# Patient Record
Sex: FEMALE | Race: WHITE | Employment: OTHER | URBAN - METROPOLITAN AREA
[De-identification: names, ages, dates, MRNs, and addresses within clinical notes are randomized per-mention and may not be internally consistent; named-entity substitution may affect disease eponyms.]

---

## 2017-01-01 ENCOUNTER — APPOINTMENT (OUTPATIENT)
Dept: GENERAL RADIOLOGY | Age: 82
End: 2017-01-01
Attending: EMERGENCY MEDICINE
Payer: MEDICARE

## 2017-01-01 ENCOUNTER — HOSPITAL ENCOUNTER (EMERGENCY)
Age: 82
Discharge: HOME | End: 2017-12-17
Attending: EMERGENCY MEDICINE
Payer: MEDICARE

## 2017-01-01 VITALS
HEART RATE: 72 BPM | TEMPERATURE: 97.8 F | OXYGEN SATURATION: 95 % | RESPIRATION RATE: 18 BRPM | HEIGHT: 65 IN | DIASTOLIC BLOOD PRESSURE: 164 MMHG | SYSTOLIC BLOOD PRESSURE: 219 MMHG | BODY MASS INDEX: 21.66 KG/M2 | WEIGHT: 130 LBS

## 2017-01-01 DIAGNOSIS — R06.02 SOB (SHORTNESS OF BREATH): Primary | ICD-10-CM

## 2017-01-01 LAB
ALBUMIN SERPL-MCNC: 3.8 G/DL (ref 3.5–5)
ALBUMIN/GLOB SERPL: 1 {RATIO} (ref 1.1–2.2)
ALP SERPL-CCNC: 102 U/L (ref 45–117)
ALT SERPL-CCNC: 20 U/L (ref 12–78)
ANION GAP SERPL CALC-SCNC: 6 MMOL/L (ref 5–15)
APTT PPP: 26.1 SEC (ref 22.1–32.5)
AST SERPL-CCNC: 14 U/L (ref 15–37)
ATRIAL RATE: 69 BPM
BASOPHILS # BLD: 0 K/UL (ref 0–0.1)
BASOPHILS NFR BLD: 0 % (ref 0–1)
BILIRUB SERPL-MCNC: 0.2 MG/DL (ref 0.2–1)
BNP SERPL-MCNC: 354 PG/ML (ref 0–450)
BUN SERPL-MCNC: 18 MG/DL (ref 6–20)
BUN/CREAT SERPL: 19 (ref 12–20)
CALCIUM SERPL-MCNC: 9.7 MG/DL (ref 8.5–10.1)
CALCULATED P AXIS, ECG09: 65 DEGREES
CALCULATED R AXIS, ECG10: 66 DEGREES
CALCULATED T AXIS, ECG11: 72 DEGREES
CHLORIDE SERPL-SCNC: 101 MMOL/L (ref 97–108)
CO2 SERPL-SCNC: 26 MMOL/L (ref 21–32)
CREAT SERPL-MCNC: 0.94 MG/DL (ref 0.55–1.02)
DIAGNOSIS, 93000: NORMAL
EOSINOPHIL # BLD: 0.2 K/UL (ref 0–0.4)
EOSINOPHIL NFR BLD: 2 % (ref 0–7)
ERYTHROCYTE [DISTWIDTH] IN BLOOD BY AUTOMATED COUNT: 13.1 % (ref 11.5–14.5)
GLOBULIN SER CALC-MCNC: 3.7 G/DL (ref 2–4)
GLUCOSE SERPL-MCNC: 139 MG/DL (ref 65–100)
HCT VFR BLD AUTO: 41.7 % (ref 35–47)
HGB BLD-MCNC: 14.1 G/DL (ref 11.5–16)
INR PPP: 1 (ref 0.9–1.1)
LYMPHOCYTES # BLD: 5.4 K/UL (ref 0.8–3.5)
LYMPHOCYTES NFR BLD: 47 % (ref 12–49)
MAGNESIUM SERPL-MCNC: 1.9 MG/DL (ref 1.6–2.4)
MCH RBC QN AUTO: 30.1 PG (ref 26–34)
MCHC RBC AUTO-ENTMCNC: 33.8 G/DL (ref 30–36.5)
MCV RBC AUTO: 89.1 FL (ref 80–99)
MONOCYTES # BLD: 0.8 K/UL (ref 0–1)
MONOCYTES NFR BLD: 7 % (ref 5–13)
NEUTS SEG # BLD: 5.2 K/UL (ref 1.8–8)
NEUTS SEG NFR BLD: 44 % (ref 32–75)
P-R INTERVAL, ECG05: 148 MS
PLATELET # BLD AUTO: 323 K/UL (ref 150–400)
POTASSIUM SERPL-SCNC: 4.5 MMOL/L (ref 3.5–5.1)
PROT SERPL-MCNC: 7.5 G/DL (ref 6.4–8.2)
PROTHROMBIN TIME: 10 SEC (ref 9–11.1)
Q-T INTERVAL, ECG07: 428 MS
QRS DURATION, ECG06: 82 MS
QTC CALCULATION (BEZET), ECG08: 458 MS
RBC # BLD AUTO: 4.68 M/UL (ref 3.8–5.2)
SODIUM SERPL-SCNC: 133 MMOL/L (ref 136–145)
THERAPEUTIC RANGE,PTTT: NORMAL SECS (ref 58–77)
TROPONIN I SERPL-MCNC: <0.04 NG/ML
VENTRICULAR RATE, ECG03: 69 BPM
WBC # BLD AUTO: 11.6 K/UL (ref 3.6–11)

## 2017-01-01 PROCEDURE — 74011250636 HC RX REV CODE- 250/636: Performed by: EMERGENCY MEDICINE

## 2017-01-01 PROCEDURE — 71010 XR CHEST PORT: CPT

## 2017-01-01 PROCEDURE — 85025 COMPLETE CBC W/AUTO DIFF WBC: CPT | Performed by: EMERGENCY MEDICINE

## 2017-01-01 PROCEDURE — 83880 ASSAY OF NATRIURETIC PEPTIDE: CPT | Performed by: EMERGENCY MEDICINE

## 2017-01-01 PROCEDURE — 85730 THROMBOPLASTIN TIME PARTIAL: CPT | Performed by: EMERGENCY MEDICINE

## 2017-01-01 PROCEDURE — 85610 PROTHROMBIN TIME: CPT | Performed by: EMERGENCY MEDICINE

## 2017-01-01 PROCEDURE — 80053 COMPREHEN METABOLIC PANEL: CPT | Performed by: EMERGENCY MEDICINE

## 2017-01-01 PROCEDURE — 93005 ELECTROCARDIOGRAM TRACING: CPT

## 2017-01-01 PROCEDURE — 99283 EMERGENCY DEPT VISIT LOW MDM: CPT

## 2017-01-01 PROCEDURE — 83735 ASSAY OF MAGNESIUM: CPT | Performed by: EMERGENCY MEDICINE

## 2017-01-01 PROCEDURE — 36415 COLL VENOUS BLD VENIPUNCTURE: CPT | Performed by: EMERGENCY MEDICINE

## 2017-01-01 PROCEDURE — 84484 ASSAY OF TROPONIN QUANT: CPT | Performed by: EMERGENCY MEDICINE

## 2017-01-01 RX ORDER — LORAZEPAM 2 MG/ML
1 INJECTION INTRAMUSCULAR
Status: COMPLETED | OUTPATIENT
Start: 2017-01-01 | End: 2017-01-01

## 2017-01-01 RX ADMIN — LORAZEPAM 1 MG: 2 INJECTION, SOLUTION INTRAMUSCULAR; INTRAVENOUS at 20:03

## 2017-01-24 ENCOUNTER — OFFICE VISIT (OUTPATIENT)
Dept: NEUROLOGY | Age: 82
End: 2017-01-24

## 2017-01-25 ENCOUNTER — OFFICE VISIT (OUTPATIENT)
Dept: NEUROLOGY | Age: 82
End: 2017-01-25

## 2017-01-25 VITALS
BODY MASS INDEX: 23.81 KG/M2 | HEART RATE: 72 BPM | SYSTOLIC BLOOD PRESSURE: 128 MMHG | DIASTOLIC BLOOD PRESSURE: 70 MMHG | OXYGEN SATURATION: 97 % | TEMPERATURE: 98.3 F | WEIGHT: 134.4 LBS | HEIGHT: 63 IN | RESPIRATION RATE: 20 BRPM

## 2017-01-25 DIAGNOSIS — R25.1 TREMOR: Primary | ICD-10-CM

## 2017-01-25 RX ORDER — PROPRANOLOL HYDROCHLORIDE 20 MG/1
20 TABLET ORAL 3 TIMES DAILY
Qty: 90 TAB | Refills: 6 | Status: ON HOLD | OUTPATIENT
Start: 2017-01-25 | End: 2018-01-01

## 2017-01-25 NOTE — PATIENT INSTRUCTIONS
Information Regarding Testing     If you have physican order for a test or a medication denied by your insurance company, this does not mean the test or medication is not appropriate for you as that is a medical decision, not a decision to be made by an insurance company representative or by an [facility] physician who has not interviewed and examined you. This is a decision to be made between you and your physician. The denial of services is a contractual matter between you and your insurance company, not an issue between your physician and the insurance company. If your test or medication is denied, you can take the following steps to help resolve the issue:    1. File a complaint with the [facility] regarding your insurance company's denial of services ordered for you. You can do this either by calling them directly or by completing an on-line complaint form on the [employer]. This can be found at [email or web address]    2. Also file a formal complaint with your insurance company and ask to have the name of the person denying the service so that you may explore a legal option should you be harmed by this denial of service. Again, the fact the insurance company will not pay for the service does not mean it is not medically necessary and I would encourage you to follow through with the plan that was made with your physician    3. File a written complaint with your employer so your employer and benefit manager is aware of the poor coverage they are providing their employees. If you have medicare/medicaid, complain to your representative in the House and to your Keila Alford. If we have ordered testing for you, we do not call patients with results and we do not give test results over the phone. We schedule follow up appointments so that your results can be discussed in person and any questions you have regarding them may be addressed.   If something of concern is revealed on your test, we will call you for a sooner follow up appointment. Additionally, results may be found by using the My Chart feature and one of our patient service representatives at the  can give you instructions on how to access this feature of our electronic medical record system. Learning About Living Leighann Mukherjee  What is a living will? A living will is a legal form you use to write down the kind of care you want at the end of your life. It is used by the health professionals who will treat you if you aren't able to decide for yourself. If you put your wishes in writing, your loved ones and others will know what kind of care you want. They won't need to guess. This can ease your mind and be helpful to others. A living will is not the same as an estate or property will. An estate will explains what you want to happen with your money and property after you die. Is a living will a legal document? A living will is a legal document. Each state has its own laws about living guevara. If you move to another state, make sure that your living will is legal in the state where you now live. Or you might use a universal form that has been approved by many states. This kind of form can sometimes be completed and stored online. Your electronic copy will then be available wherever you have a connection to the Internet. In most cases, doctors will respect your wishes even if you have a form from a different state. · You don't need an  to complete a living will. But legal advice can be helpful if your state's laws are unclear, your health history is complicated, or your family can't agree on what should be in your living will. · You can change your living will at any time. Some people find that their wishes about end-of-life care change as their health changes. · In addition to making a living will, think about completing a medical power of  form.  This form lets you name the person you want to make end-of-life treatment decisions for you (your \"health care agent\") if you're not able to. Many hospitals and nursing homes will give you the forms you need to complete a living will and a medical power of . · Your living will is used only if you can't make or communicate decisions for yourself anymore. If you become able to make decisions again, you can accept or refuse any treatment, no matter what you wrote in your living will. · Your state may offer an online registry. This is a place where you can store your living will online so the doctors and nurses who need to treat you can find it right away. What should you think about when creating a living will? Talk about your end-of-life wishes with your family members and your doctor. Let them know what you want. That way the people making decisions for you won't be surprised by your choices. Think about these questions as you make your living will:  · Do you know enough about life support methods that might be used? If not, talk to your doctor so you know what might be done if you can't breathe on your own, your heart stops, or you're unable to swallow. · What things would you still want to be able to do after you receive life-support methods? Would you want to be able to walk? To speak? To eat on your own? To live without the help of machines? · If you have a choice, where do you want to be cared for? In your home? At a hospital or nursing home? · Do you want certain Jewish practices performed if you become very ill? · If you have a choice at the end of your life, where would you prefer to die? At home? In a hospital or nursing home? Somewhere else? · Would you prefer to be buried or cremated? · Do you want your organs to be donated after you die? What should you do with your living will? · Make sure that your family members and your health care agent have copies of your living will.   · Give your doctor a copy of your living will to keep in your medical record. If you have more than one doctor, make sure that each one has a copy. · You may want to put a copy of your living will where it can be easily found. Where can you learn more? Go to http://marina-duglas.info/. Enter M720 in the search box to learn more about \"Learning About Living Candi. \"  Current as of: February 24, 2016  Content Version: 11.1  © 3794-3140 [employer]. Care instructions adapted under license by [employer] (which disclaims liability or warranty for this information). If you have questions about a medical condition or this instruction, always ask your healthcare professional. [place] [address] any warranty or liability for your use of this information. Patient sent for evaluation of tremor but I am not convinced it is Parkinson's. Unfortunately patient is upset and that makes the tremor more coarse and harder to evaluate. 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  She does take a nebulizer and I do not want to upset her pulmonary function. The other possibility is Mysoline but she is on Coumadin. Is already on gabapentin. The other possibility is Topamax. Revisit in about 2 months or so once we find a drug of interest and safety.

## 2017-01-25 NOTE — PROGRESS NOTES
Neurology Consult      Subjective:      Kike Hernandez is a 80 y.o. female who came in with specific referral from ophthalmology for concerns of Parkinson's disease. During routine visit yesterday had what was seen as a head tremor and possible rest tremor attributes. Unfortunately very uptight and stressed today and it makes being objective about her tremors extremely hard to say the least.  She says her dad and several family members have Parkinson's? Says she is not unduly concerned about the tremor and actually has more concerns about her visual capacity. 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 I will challenge these tremors at least initially with low-dose propranolol after getting the clearance with Dr. Kelli Montez her primary care doctor. Mysoline is another possibility although she is on Coumadin. Topamax is another treatment choice. Already on gabapentin. As per previous office visit in August 2015 she has multifactorial gait dysfunction contributors. Current Outpatient Prescriptions   Medication Sig Dispense Refill    glipiZIDE-metFORMIN (METAGLIP) 2.5-500 mg per tablet Take 1 Tab by mouth Before breakfast and dinner.  nortriptyline (PAMELOR) 10 mg capsule Take 40 mg by mouth nightly.  temazepam (RESTORIL) 30 mg capsule Take  by mouth nightly as needed for Sleep.  ACETAMINOPHEN (TYLENOL EXTRA STRENGTH PO) Take  by mouth.  mometasone-formoterol (DULERA) 200-5 mcg/actuation HFA inhaler Take 2 Puffs by inhalation two (2) times a day.  albuterol (PROVENTIL VENTOLIN) 2.5 mg /3 mL (0.083 %) nebulizer solution 2.5 mg by Nebulization route four (4) times daily.  SIMVASTATIN PO Take  by mouth daily.  gabapentin (NEURONTIN) 600 mg tablet Take one tablet by mouth three times a day      mupirocin (BACTROBAN) 2 % ointment Apply  to affected area three (3) times daily.  Apply to area for 10 days 22 g 0    clotrimazole-betamethasone (LOTRISONE) topical cream Apply  to affected area two (2) times a day. Apply to affected area 1 Tube 0    amiodarone (CORDARONE) 200 mg tablet Take  by mouth.  ergocalciferol (ERGOCALCIFEROL) 50,000 unit capsule Take 50,000 Units by mouth.  Flaxseed Oil oil by Does Not Apply route.  omega-3 fatty acids-vitamin e (FISH OIL) 1,000 mg cap Take 1 Cap by mouth.  GLUCOSAMINE/CHONDRO QUEZADA A (COSAMIN DS PO) Take  by mouth.  GINKGO BILOBA (GINKOBA PO) Take  by mouth.  warfarin (COUMADIN) 2 mg tablet Take 2 mg by mouth every evening.  VIT A/VIT C/VIT E/ZINC/COPPER (OCUVITE PRESERVISION PO) Take 1 Cap by mouth daily.  omeprazole (PRILOSEC) 20 mg capsule Take 20 mg by mouth two (2) times a day.  gabapentin (NEURONTIN) 400 mg capsule Take 1,600 mg by mouth nightly. Allergies   Allergen Reactions    Prednisone Other (comments)     \"I have the flu; I'm not going to take it\"     Past Medical History   Diagnosis Date    Chronic pain      Peripheral neuropathy    Diabetes [facility])       Past Surgical History   Procedure Laterality Date    Pr abdomen surgery proc unlisted  5035     Umbilical Hernia repair    Pr cardiac surg procedure unlist  4/2014     valve replacement      Social History     Social History    Marital status:      Spouse name: N/A    Number of children: N/A    Years of education: N/A     Occupational History    Not on file.      Social History Main Topics    Smoking status: Former Smoker     Packs/day: 1.00     Years: 60.00     Quit date: 3/30/2013    Smokeless tobacco: Not on file    Alcohol use No    Drug use: No    Sexual activity: Not on file     Other Topics Concern    Not on file     Social History Narrative      Family History   Problem Relation Age of Onset    Diabetes Mother     Parkinsonism Father     Diabetes Brother       Visit Vitals    /70    Pulse 72    Temp 98.3 °F (36.8 °C) (Oral)    Resp 20  Ht 5' 3\" (1.6 m)    Wt 61 kg (134 lb 6.4 oz)    SpO2 97%    BMI 23.81 kg/m2        Review of Systems:   A comprehensive review of systems was negative except for that written in the HPI. Neuro Exam:     Appearance: The patient is well developed, well nourished, provides a coherent history and is in no acute distress. Mental Status: Oriented to time, place and person. Mood and affect appropriate. Cranial Nerves:   Intact visual fields. Fundi are benign. SASKIA, EOM's full, no nystagmus, no ptosis. Facial sensation is normal. Corneal reflexes are intact. Facial movement is symmetric. Hearing is normal bilaterally. Palate is midline with normal sternocleidomastoid and trapezius muscles are normal. Tongue is midline. Motor:  5/5 strength in upper and lower proximal and distal muscles. Normal bulk and tone. No fasciculations. Reflexes:   Deep tendon reflexes 2+/4 and symmetrical.   Sensory:   Normal to touch, pinprick and vibration. Gait:   transfer was slow and cautious and relied on my hand step to step performance. I thought armswing was slightly subdued but not pathologic and I did not see a gait assisted tremor in the hands. Saw a very sporadic head tremor but was subtle. Tremor:    unfortunately patient very uptight and stressed today about multiple items and it was very hard to be objective about what I was seeing in the way of tremor. I was not convinced however that what I saw was Parkinson's. Tended to have more essential tremor characteristics. Cerebellar:  No cerebellar signs present. Neurovascular:  Normal heart sounds and regular rhythm, peripheral pulses intact, and no carotid bruits. Not bradykinetic and not rigid. Assessment:   Tremors. Unfortunately patient so upset emotionally about things in general that it was hard to be completely objective about the tremors I saw today.   I caught a very brief glimpse of the head tremor and may be coarse tremors of the left greater than right hands. Was not convinced I saw Parkinson's on this encounter. Addendum: I just got off the phone with her primary care Dr. Leonardo Unger and he okays the use of low-dose propranolol. Will suggest 20 mg 3 times daily. Plan:   Revisit in about 2 months.   Signed by :  Selby Seip MD

## 2017-01-25 NOTE — MR AVS SNAPSHOT
Visit Information Date & Time Provider Department Dept. Phone Encounter #  
 1/25/2017  1:20 PM Vicki Bland MD Neurology [facility] [phone] [id number] Follow-up Instructions Return in about 2 months (around 3/25/2017). Upcoming Health Maintenance Date Due DTaP/Tdap/Td series (1 - Tdap) 10/17/1953 ZOSTER VACCINE AGE 60> 10/17/1992 GLAUCOMA SCREENING Q2Y 10/17/1997 OSTEOPOROSIS SCREENING (DEXA) 10/17/1997 Pneumococcal 65+ Low/Medium Risk (1 of 2 - PCV13) 10/17/1997 MEDICARE YEARLY EXAM 10/17/1997 INFLUENZA AGE 9 TO ADULT 8/1/2016 Allergies as of 1/25/2017  Review Complete On: 1/25/2017 By: Vicki Bland MD  
  
 Severity Noted Reaction Type Reactions Prednisone  04/15/2014    Other (comments) \"I have the flu; I'm not going to take it\" Current Immunizations  Never Reviewed No immunizations on file. Not reviewed this visit You Were Diagnosed With   
  
 Codes Comments Tremor    -  Primary ICD-10-CM: R25.1 ICD-9-CM: 743. 0 Vitals BP Pulse Temp Resp Height(growth percentile) Weight(growth percentile) 128/70 72 98.3 °F (36.8 °C) (Oral) 20 5' 3\" (1.6 m) 134 lb 6.4 oz (61 kg) SpO2 BMI OB Status Smoking Status 97% 23.81 kg/m2 Postmenopausal Former Smoker Vitals History BMI and BSA Data Body Mass Index Body Surface Area  
 23.81 kg/m 2 1.65 m 2 Preferred Pharmacy Pharmacy Name Phone [phone] [place] [address] [phone] Your Updated Medication List  
  
   
This list is accurate as of: 1/25/17  2:03 PM.  Always use your most recent med list.  
  
  
  
  
 albuterol 2.5 mg /3 mL (0.083 %) nebulizer solution Commonly known as:  PROVENTIL VENTOLIN  
2.5 mg by Nebulization route four (4) times daily. amiodarone 200 mg tablet Commonly known as:  CORDARONE Take  by mouth.   
  
 clotrimazole-betamethasone topical cream  
 Commonly known as:  Haig  Apply  to affected area two (2) times a day. Apply to affected area COSAMIN DS PO Take  by mouth.  
  
 ergocalciferol 50,000 unit capsule Commonly known as:  ERGOCALCIFEROL Take 50,000 Units by mouth. FISH OIL 1,000 mg Cap Generic drug:  omega-3 fatty acids-vitamin e Take 1 Cap by mouth. Flaxseed Oil Oil  
by Does Not Apply route. * gabapentin 400 mg capsule Commonly known as:  NEURONTIN Take 1,600 mg by mouth nightly. * gabapentin 600 mg tablet Commonly known as:  NEURONTIN Take one tablet by mouth three times a day GINKOBA PO Take  by mouth. glipiZIDE-metFORMIN 2.5-500 mg per tablet Commonly known as:  METAGLIP Take 1 Tab by mouth Before breakfast and dinner. mometasone-formoterol 200-5 mcg/actuation HFA inhaler Commonly known as:  [place] Take 2 Puffs by inhalation two (2) times a day. mupirocin 2 % ointment Commonly known as:  [employer] Apply  to affected area three (3) times daily. Apply to area for 10 days  
  
 nortriptyline 10 mg capsule Commonly known as:  PAMELOR Take 40 mg by mouth nightly. OCUVITE PRESERVISION PO Take 1 Cap by mouth daily. PriLOSEC 20 mg capsule Generic drug:  omeprazole Take 20 mg by mouth two (2) times a day. propranolol 20 mg tablet Commonly known as:  INDERAL Take 1 Tab by mouth three (3) times daily. RESTORIL 30 mg capsule Generic drug:  temazepam  
Take  by mouth nightly as needed for Sleep. SIMVASTATIN PO Take  by mouth daily. TYLENOL EXTRA STRENGTH PO Take  by mouth.  
  
 warfarin 2 mg tablet Commonly known as:  COUMADIN Take 2 mg by mouth every evening. * Notice: This list has 2 medication(s) that are the same as other medications prescribed for you. Read the directions carefully, and ask your doctor or other care provider to review them with you. Prescriptions Sent to Pharmacy Refills  
 propranolol (INDERAL) 20 mg tablet 6 Sig: Take 1 Tab by mouth three (3) times daily. Class: Normal  
 Pharmacy: [place] [address] [id number] [place] [address], [address]  #: [phone] Route: Oral  
  
Follow-up Instructions Return in about 2 months (around 3/25/2017). Patient Instructions Information Regarding Testing If you have physican order for a test or a medication denied by your insurance company, this does not mean the test or medication is not appropriate for you as that is a medical decision, not a decision to be made by an insurance company representative or by an [facility] physician who has not interviewed and examined you. This is a decision to be made between you and your physician. The denial of services is a contractual matter between you and your insurance company, not an issue between your physician and the insurance company. If your test or medication is denied, you can take the following steps to help resolve the issue: 1. File a complaint with the [employer] regarding your insurance company's denial of services ordered for you. You can do this either by calling them directly or by completing an on-line complaint form on the [employer]. This can be found at [email or web address] 2. Also file a formal complaint with your insurance company and ask to have the name of the person denying the service so that you may explore a legal option should you be harmed by this denial of service. Again, the fact the insurance company will not pay for the service does not mean it is not medically necessary and I would encourage you to follow through with the plan that was made with your physician 3.    File a written complaint with your employer so your employer and benefit manager is aware of the poor coverage they are providing their employees. If you have medicare/medicaid, complain to your representative in the House and to your Keila Alford. If we have ordered testing for you, we do not call patients with results and we do not give test results over the phone. We schedule follow up appointments so that your results can be discussed in person and any questions you have regarding them may be addressed. If something of concern is revealed on your test, we will call you for a sooner follow up appointment. Additionally, results may be found by using the My Chart feature and one of our patient service representatives at the  can give you instructions on how to access this feature of our electronic medical record system. Luis Ledesma 1721 What is a living will? A living will is a legal form you use to write down the kind of care you want at the end of your life. It is used by the health professionals who will treat you if you aren't able to decide for yourself. If you put your wishes in writing, your loved ones and others will know what kind of care you want. They won't need to guess. This can ease your mind and be helpful to others. A living will is not the same as an estate or property will. An estate will explains what you want to happen with your money and property after you die. Is a living will a legal document? A living will is a legal document. Each state has its own laws about living guevara. If you move to another state, make sure that your living will is legal in the state where you now live. Or you might use a universal form that has been approved by many states. This kind of form can sometimes be completed and stored online. Your electronic copy will then be available wherever you have a connection to the Internet. In most cases, doctors will respect your wishes even if you have a form from a different state. · You don't need an  to complete a living will.  But legal advice can be helpful if your state's laws are unclear, your health history is complicated, or your family can't agree on what should be in your living will. · You can change your living will at any time. Some people find that their wishes about end-of-life care change as their health changes. · In addition to making a living will, think about completing a medical power of  form. This form lets you name the person you want to make end-of-life treatment decisions for you (your \"health care agent\") if you're not able to. Many hospitals and nursing homes will give you the forms you need to complete a living will and a medical power of . · Your living will is used only if you can't make or communicate decisions for yourself anymore. If you become able to make decisions again, you can accept or refuse any treatment, no matter what you wrote in your living will. · Your state may offer an online registry. This is a place where you can store your living will online so the doctors and nurses who need to treat you can find it right away. What should you think about when creating a living will? Talk about your end-of-life wishes with your family members and your doctor. Let them know what you want. That way the people making decisions for you won't be surprised by your choices. Think about these questions as you make your living will: · Do you know enough about life support methods that might be used? If not, talk to your doctor so you know what might be done if you can't breathe on your own, your heart stops, or you're unable to swallow. · What things would you still want to be able to do after you receive life-support methods? Would you want to be able to walk? To speak? To eat on your own? To live without the help of machines? · If you have a choice, where do you want to be cared for? In your home? At a hospital or nursing home? · Do you want certain Buddhist practices performed if you become very ill? · If you have a choice at the end of your life, where would you prefer to die? At home? In a hospital or nursing home? Somewhere else? · Would you prefer to be buried or cremated? · Do you want your organs to be donated after you die? What should you do with your living will? · Make sure that your family members and your health care agent have copies of your living will. · Give your doctor a copy of your living will to keep in your medical record. If you have more than one doctor, make sure that each one has a copy. · You may want to put a copy of your living will where it can be easily found. Where can you learn more? Go to http://marina-duglas.info/. Enter G207 in the search box to learn more about \"Learning About Living Perroy. \" Current as of: February 24, 2016 Content Version: 11.1 © 2939-7062 [employer]. Care instructions adapted under license by [employer] (which disclaims liability or warranty for this information). If you have questions about a medical condition or this instruction, always ask your healthcare professional. [address] any warranty or liability for your use of this information. Patient sent for evaluation of tremor but I am not convinced it is Parkinson's. Unfortunately patient is upset and that makes the tremor more coarse and harder to evaluate. 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  She does take a nebulizer and I do not want to upset her pulmonary function. The other possibility is Mysoline but she is on Coumadin. Is already on gabapentin. The other possibility is Topamax. Revisit in about 2 months or so once we find a drug of interest and safety. Introducing [facility]!    
 Renetta Alvarado introduces [employer] patient portal. Now you can access parts of your medical record, email your doctor's office, and request medication refills online. 1. In your internet browser, go to [email or web address] 2. Click on the First Time User? Click Here link in the Sign In box. You will see the New Member Sign Up page. 3. Enter your [employer] Access Code exactly as it appears below. You will not need to use this code after youve completed the sign-up process. If you do not sign up before the expiration date, you must request a new code. · [employer] Access Code: [email or web address] Expires: 4/24/2017  3:44 PM 
 
4. Enter the last four digits of your Social Security Number (xxxx) and Date of Birth (mm/dd/yyyy) as indicated and click Submit. You will be taken to the next sign-up page. 5. Create a [employer] ID. This will be your [employer] login ID and cannot be changed, so think of one that is secure and easy to remember. 6. Create a [employer] password. You can change your password at any time. 7. Enter your Password Reset Question and Answer. This can be used at a later time if you forget your password. 8. Enter your e-mail address. You will receive e-mail notification when new information is available in [address] 9. Click Sign Up. You can now view and download portions of your medical record. 10. Click the Download Summary menu link to download a portable copy of your medical information. If you have questions, please visit the Frequently Asked Questions section of the [employer] website. Remember, [employer] is NOT to be used for urgent needs. For medical emergencies, dial 911. Now available from your iPhone and Android! Please provide this summary of care documentation to your next provider. Your primary care clinician is listed as [place]. If you have any questions after today's visit, please call [phone].

## 2017-02-24 ENCOUNTER — APPOINTMENT (OUTPATIENT)
Dept: CT IMAGING | Age: 82
End: 2017-02-24
Attending: EMERGENCY MEDICINE
Payer: MEDICARE

## 2017-02-24 ENCOUNTER — HOSPITAL ENCOUNTER (EMERGENCY)
Age: 82
Discharge: HOME | End: 2017-02-24
Attending: EMERGENCY MEDICINE
Payer: MEDICARE

## 2017-02-24 VITALS
RESPIRATION RATE: 20 BRPM | TEMPERATURE: 98 F | HEART RATE: 73 BPM | DIASTOLIC BLOOD PRESSURE: 79 MMHG | BODY MASS INDEX: 24.8 KG/M2 | OXYGEN SATURATION: 96 % | WEIGHT: 140 LBS | SYSTOLIC BLOOD PRESSURE: 146 MMHG

## 2017-02-24 DIAGNOSIS — R73.9 HYPERGLYCEMIA: ICD-10-CM

## 2017-02-24 DIAGNOSIS — F03.90 DEMENTIA WITHOUT BEHAVIORAL DISTURBANCE, UNSPECIFIED DEMENTIA TYPE: Primary | ICD-10-CM

## 2017-02-24 LAB
ALBUMIN SERPL BCP-MCNC: 3.8 G/DL (ref 3.5–5)
ALBUMIN/GLOB SERPL: 1 {RATIO} (ref 1.1–2.2)
ALP SERPL-CCNC: 80 U/L (ref 45–117)
ALT SERPL-CCNC: 20 U/L (ref 12–78)
ANION GAP BLD CALC-SCNC: 8 MMOL/L (ref 5–15)
APPEARANCE UR: CLEAR
AST SERPL W P-5'-P-CCNC: 22 U/L (ref 15–37)
ATRIAL RATE: 73 BPM
BACTERIA URNS QL MICRO: NEGATIVE /HPF
BASOPHILS # BLD AUTO: 0 K/UL (ref 0–0.1)
BASOPHILS # BLD: 0 % (ref 0–1)
BILIRUB SERPL-MCNC: 0.4 MG/DL (ref 0.2–1)
BILIRUB UR QL: NEGATIVE
BUN SERPL-MCNC: 19 MG/DL (ref 6–20)
BUN/CREAT SERPL: 23 (ref 12–20)
CALCIUM SERPL-MCNC: 9.9 MG/DL (ref 8.5–10.1)
CALCULATED P AXIS, ECG09: 61 DEGREES
CALCULATED R AXIS, ECG10: 59 DEGREES
CALCULATED T AXIS, ECG11: 64 DEGREES
CHLORIDE SERPL-SCNC: 101 MMOL/L (ref 97–108)
CO2 SERPL-SCNC: 28 MMOL/L (ref 21–32)
COLOR UR: ABNORMAL
CREAT SERPL-MCNC: 0.84 MG/DL (ref 0.55–1.02)
DIAGNOSIS, 93000: NORMAL
EOSINOPHIL # BLD: 0.1 K/UL (ref 0–0.4)
EOSINOPHIL NFR BLD: 1 % (ref 0–7)
EPITH CASTS URNS QL MICRO: ABNORMAL /LPF
ERYTHROCYTE [DISTWIDTH] IN BLOOD BY AUTOMATED COUNT: 13.4 % (ref 11.5–14.5)
GLOBULIN SER CALC-MCNC: 3.9 G/DL (ref 2–4)
GLUCOSE BLD STRIP.AUTO-MCNC: 196 MG/DL (ref 65–100)
GLUCOSE SERPL-MCNC: 216 MG/DL (ref 65–100)
GLUCOSE UR STRIP.AUTO-MCNC: NEGATIVE MG/DL
HCT VFR BLD AUTO: 40.9 % (ref 35–47)
HGB BLD-MCNC: 13.1 G/DL (ref 11.5–16)
HGB UR QL STRIP: NEGATIVE
HYALINE CASTS URNS QL MICRO: ABNORMAL /LPF (ref 0–5)
KETONES UR QL STRIP.AUTO: 15 MG/DL
LEUKOCYTE ESTERASE UR QL STRIP.AUTO: NEGATIVE
LYMPHOCYTES # BLD AUTO: 45 % (ref 12–49)
LYMPHOCYTES # BLD: 4.8 K/UL (ref 0.8–3.5)
MAGNESIUM SERPL-MCNC: 1.8 MG/DL (ref 1.6–2.4)
MCH RBC QN AUTO: 29 PG (ref 26–34)
MCHC RBC AUTO-ENTMCNC: 32 G/DL (ref 30–36.5)
MCV RBC AUTO: 90.5 FL (ref 80–99)
MONOCYTES # BLD: 0.7 K/UL (ref 0–1)
MONOCYTES NFR BLD AUTO: 7 % (ref 5–13)
NEUTS SEG # BLD: 5.1 K/UL (ref 1.8–8)
NEUTS SEG NFR BLD AUTO: 47 % (ref 32–75)
NITRITE UR QL STRIP.AUTO: NEGATIVE
P-R INTERVAL, ECG05: 144 MS
PH UR STRIP: 6 [PH] (ref 5–8)
PLATELET # BLD AUTO: 491 K/UL (ref 150–400)
POTASSIUM SERPL-SCNC: 4 MMOL/L (ref 3.5–5.1)
PROT SERPL-MCNC: 7.7 G/DL (ref 6.4–8.2)
PROT UR STRIP-MCNC: 100 MG/DL
Q-T INTERVAL, ECG07: 470 MS
QRS DURATION, ECG06: 82 MS
QTC CALCULATION (BEZET), ECG08: 517 MS
RBC # BLD AUTO: 4.52 M/UL (ref 3.8–5.2)
RBC #/AREA URNS HPF: ABNORMAL /HPF (ref 0–5)
SERVICE CMNT-IMP: ABNORMAL
SODIUM SERPL-SCNC: 137 MMOL/L (ref 136–145)
SP GR UR REFRACTOMETRY: 1.03 (ref 1–1.03)
TROPONIN I SERPL-MCNC: <0.04 NG/ML
UA: UC IF INDICATED,UAUC: ABNORMAL
UROBILINOGEN UR QL STRIP.AUTO: 0.2 EU/DL (ref 0.2–1)
VENTRICULAR RATE, ECG03: 73 BPM
WBC # BLD AUTO: 10.6 K/UL (ref 3.6–11)
WBC URNS QL MICRO: ABNORMAL /HPF (ref 0–4)

## 2017-02-24 PROCEDURE — 36415 COLL VENOUS BLD VENIPUNCTURE: CPT | Performed by: EMERGENCY MEDICINE

## 2017-02-24 PROCEDURE — 99285 EMERGENCY DEPT VISIT HI MDM: CPT

## 2017-02-24 PROCEDURE — 80053 COMPREHEN METABOLIC PANEL: CPT | Performed by: EMERGENCY MEDICINE

## 2017-02-24 PROCEDURE — 70450 CT HEAD/BRAIN W/O DYE: CPT

## 2017-02-24 PROCEDURE — 93005 ELECTROCARDIOGRAM TRACING: CPT

## 2017-02-24 PROCEDURE — 81001 URINALYSIS AUTO W/SCOPE: CPT | Performed by: EMERGENCY MEDICINE

## 2017-02-24 PROCEDURE — 85025 COMPLETE CBC W/AUTO DIFF WBC: CPT | Performed by: EMERGENCY MEDICINE

## 2017-02-24 PROCEDURE — 84484 ASSAY OF TROPONIN QUANT: CPT | Performed by: EMERGENCY MEDICINE

## 2017-02-24 PROCEDURE — 83735 ASSAY OF MAGNESIUM: CPT | Performed by: EMERGENCY MEDICINE

## 2017-02-24 PROCEDURE — 82962 GLUCOSE BLOOD TEST: CPT

## 2017-02-24 NOTE — DISCHARGE INSTRUCTIONS
Dementia: Care Instructions  Your Care Instructions  Dementia is a loss of mental skills that affects your daily life. It is different than the occasional trouble with memory that is part of aging. You may find it hard to remember things that you feel you should be able to remember. Or you may feel that your mind is just not working as well as usual.  Finding out that you have dementia is a shock. You may be afraid and worried about how the condition will change your life. Although there is no cure at this time, medicine may slow memory loss and improve thinking for a while. Other medicines may be able to help you sleep or cope with depression and behavior changes. Dementia often gets worse slowly. But it can get worse quickly. As dementia gets worse, it may become harder to do common things that take planning, like making a list and going shopping. Over time, the disease may make it hard for you to take care of yourself. Some people with dementia need others to help care for them. Dementia is different for everyone. You may be able to function well for a long time. In the early stage of the condition, you can do things at home to make life easier and safer. You also can keep doing your hobbies and other activities. Many people find comfort in planning now for their future needs. Follow-up care is a key part of your treatment and safety. Be sure to make and go to all appointments, and call your doctor if you are having problems. Its also a good idea to know your test results and keep a list of the medicines you take. How can you care for yourself at home? · Take your medicines exactly as prescribed. Call your doctor if you think you are having a problem with your medicine. · Eat healthy foods. Eat lots of whole grains, fruits, and vegetables every day.  If you are not hungry, try snacks or nutritional drinks such as Boost, Ensure, or Sustacal.  · If you have problems sleeping:  ¨ Try not to nap too close to your bedtime. ¨ Exercise regularly. Walking is a good choice. ¨ Try a glass of warm milk or caffeine-free herbal tea before bed. · Do tasks and activities during the time of day when you feel your best. It may help to develop a daily routine. · Post labels, lists, and sticky notes to help you remember things. Write your activities on a calendar you can easily find. Put your clock where you can easily see it. · Stay active. Take walks in familiar places, or with friends or loved ones. Try to stay active mentally too. Read and work crossword puzzles if you enjoy these activities. · Do not drive unless you can pass an on-road driving test. If you are not sure if you are safe to drive, your state s license bureau can test you. · Keep a cordless phone and a flashlight with new batteries by your bed. If possible, put a phone in each of the main rooms of your house, or carry a cell phone in case you fall and cannot reach a phone. Or, you can wear a device around your neck or wrist. You push a button that sends a signal for help. Acknowledge your emotions and plan for the future  · Talk openly and honestly with your doctor. · Let yourself grieve. It is common to feel angry, scared, frustrated, anxious, or depressed. · Get emotional support from family, friends, a support group, or a counselor experienced in working with people who have dementia. · Ask for help if you need it. · Plan for the future. ¨ Talk to your family and doctor about preparing a living will and other important papers while you can make decisions. These papers tell your doctors how to care for you at the end of your life. ¨ Consider naming a person to make decisions about your care if you are not able to. When should you call for help? Call 911 anytime you think you may need emergency care. For example, call if:  · You are lost and do not know whom to call. · You are injured and do not know whom to call.   Call your doctor now or seek immediate medical care if:  · You are more confused or upset than usual.  · You feel like you could hurt yourself because your mind is not working well. Watch closely for changes in your health, and be sure to contact your doctor if you have any problems. Where can you learn more? Go to http://marina-duglas.info/. Enter R824 in the search box to learn more about \"Dementia: Care Instructions. \"  Current as of: July 26, 2016  Content Version: 11.1  © 7915-6149 [employer]. Care instructions adapted under license by [employer] (which disclaims liability or warranty for this information). If you have questions about a medical condition or this instruction, always ask your healthcare professional. [address] any warranty or liability for your use of this information. Learning About High Blood Sugar  What is high blood sugar? Your body turns the food you eat into glucose (sugar), which it uses for energy. But if your body isn't able to use the sugar right away, it can build up in your blood and lead to high blood sugar. When the amount of sugar in your blood stays too high for too much of the time, you may have diabetes. Diabetes is a disease that can cause serious health problems. The good news is that lifestyle changes may help you get your blood sugar back to normal and avoid or delay diabetes. What causes high blood sugar? Sugar (glucose) can build up in your blood if you:  · Are overweight. · Have a family history of diabetes. · Take certain medicines, such as steroids. What are the symptoms? Having high blood sugar may not cause any symptoms at all. Or it may make you feel very thirsty or very hungry. You may also urinate more often than usual, have blurry vision, or lose weight without trying. How is high blood sugar treated? You can take steps to lower your blood sugar level if you understand what makes it get higher. Your doctor may want you to learn how to test your blood sugar level at home. Then you can see how illness, stress, or different kinds of food or medicine raise or lower your blood sugar level. Other tests may be needed to see if you have diabetes. How can you prevent high blood sugar? · Watch your weight. If you're overweight, losing just a small amount of weight may help. Reducing fat around your waist is most important. · Limit the amount of calories, sweets, and unhealthy fat you eat. Ask your doctor if a dietitian can help you. A registered dietitian can help you create meal plans that fit your lifestyle. · Get at least 30 minutes of exercise on most days of the week. Exercise helps control your blood sugar. It also helps you maintain a healthy weight. Walking is a good choice. You also may want to do other activities, such as running, swimming, cycling, or playing tennis or team sports. · If your doctor prescribed medicines, take them exactly as prescribed. Call your doctor if you think you are having a problem with your medicine. You will get more details on the specific medicines your doctor prescribes. Follow-up care is a key part of your treatment and safety. Be sure to make and go to all appointments, and call your doctor if you are having problems. It's also a good idea to know your test results and keep a list of the medicines you take. Where can you learn more? Go to http://marina-duglas.info/. Enter O108 in the search box to learn more about \"Learning About High Blood Sugar. \"  Current as of: May 23, 2016  Content Version: 11.1  © 1819-6512 [employer]. Care instructions adapted under license by [employer] (which disclaims liability or warranty for this information).  If you have questions about a medical condition or this instruction, always ask your healthcare professional. [address] any warranty or liability for your use of this information. We hope that we have addressed all of your medical concerns. The examination and treatment you received in the Emergency Department were for an emergent problem and were not intended as complete care. It is important that you follow up with your healthcare provider(s) for ongoing care. If your symptoms worsen or do not improve as expected, and you are unable to reach your usual health care provider(s), you should return to the Emergency Department. Today's healthcare is undergoing tremendous change, and patient satisfaction surveys are one of the many tools to assess the quality of medical care. You may receive a survey from the [employer] regarding your experience in the Emergency Department. I hope that your experience has been completely positive, particularly the medical care that I provided. As such, please participate in the survey; anything less than excellent does not meet my expectations or intentions. [facility] and [facility] participate in nationally recognized quality of care measures. If your blood pressure is greater than 120/80, as reported below, we urge that you seek medical care to address the potential of high blood pressure, commonly known as hypertension. Hypertension can be hereditary or can be caused by certain medical conditions, pain, stress, or \"white coat syndrome. \"       Please make an appointment with your health care provider(s) for follow up of your Emergency Department visit. VITALS:   Patient Vitals for the past 8 hrs:   Temp Pulse Resp BP SpO2   02/24/17 1700 - - - 146/79 96 %   02/24/17 1624 - 73 20 (!) 150/91 97 %   02/24/17 1519 98 °F (36.7 °C) 75 22 171/81 97 %          Thank you for allowing us to provide you with medical care today. We realize that you have many choices for your emergency care needs. Please choose us in the future for any continued health care needs. Nomi Hookeret, [address].   Office: [phone]            Recent Results (from the past 24 hour(s))   EKG, 12 LEAD, INITIAL    Collection Time: 02/24/17  4:00 PM   Result Value Ref Range    Ventricular Rate 73 BPM    Atrial Rate 73 BPM    P-R Interval 144 ms    QRS Duration 82 ms    Q-T Interval 470 ms    QTC Calculation (Bezet) 517 ms    Calculated P Axis 61 degrees    Calculated R Axis 59 degrees    Calculated T Axis 64 degrees    Diagnosis       Sinus rhythm with occasional premature ventricular complexes  Nonspecific ST abnormality  Prolonged QT  Abnormal ECG  When compared with ECG of 15-APR-2014 09:11,  premature ventricular complexes are now present  QT has lengthened  Confirmed by Theresa Walker MD., Jaden ([id number]) on 2/24/2017 5:03:19 PM     GLUCOSE, POC    Collection Time: 02/24/17  4:03 PM   Result Value Ref Range    Glucose (POC) 196 (H) 65 - 100 mg/dL    Performed by Nicki Pitt    CBC WITH AUTOMATED DIFF    Collection Time: 02/24/17  4:11 PM   Result Value Ref Range    WBC 10.6 3.6 - 11.0 K/uL    RBC 4.52 3.80 - 5.20 M/uL    HGB 13.1 11.5 - 16.0 g/dL    HCT 40.9 35.0 - 47.0 %    MCV 90.5 80.0 - 99.0 FL    MCH 29.0 26.0 - 34.0 PG    MCHC 32.0 30.0 - 36.5 g/dL    RDW 13.4 11.5 - 14.5 %    PLATELET 420 (H) 912 - 400 K/uL    NEUTROPHILS 47 32 - 75 %    LYMPHOCYTES 45 12 - 49 %    MONOCYTES 7 5 - 13 %    EOSINOPHILS 1 0 - 7 %    BASOPHILS 0 0 - 1 %    ABS. NEUTROPHILS 5.1 1.8 - 8.0 K/UL    ABS. LYMPHOCYTES 4.8 (H) 0.8 - 3.5 K/UL    ABS. MONOCYTES 0.7 0.0 - 1.0 K/UL    ABS. EOSINOPHILS 0.1 0.0 - 0.4 K/UL    ABS.  BASOPHILS 0.0 0.0 - 0.1 K/UL   METABOLIC PANEL, COMPREHENSIVE    Collection Time: 02/24/17  4:11 PM   Result Value Ref Range    Sodium 137 136 - 145 mmol/L    Potassium 4.0 3.5 - 5.1 mmol/L    Chloride 101 97 - 108 mmol/L    CO2 28 21 - 32 mmol/L    Anion gap 8 5 - 15 mmol/L    Glucose 216 (H) 65 - 100 mg/dL    BUN 19 6 - 20 MG/DL    Creatinine 0.84 0.55 - 1.02 MG/DL    BUN/Creatinine ratio 23 (H) 12 - 20      GFR est AA >60 >60 ml/min/1.73m2    GFR est non-AA >60 >60 ml/min/1.73m2    Calcium 9.9 8.5 - 10.1 MG/DL    Bilirubin, total 0.4 0.2 - 1.0 MG/DL    ALT (SGPT) 20 12 - 78 U/L    AST (SGOT) 22 15 - 37 U/L    Alk. phosphatase 80 45 - 117 U/L    Protein, total 7.7 6.4 - 8.2 g/dL    Albumin 3.8 3.5 - 5.0 g/dL    Globulin 3.9 2.0 - 4.0 g/dL    A-G Ratio 1.0 (L) 1.1 - 2.2     TROPONIN I    Collection Time: 02/24/17  4:11 PM   Result Value Ref Range    Troponin-I, Qt. <0.04 <0.05 ng/mL   MAGNESIUM    Collection Time: 02/24/17  4:11 PM   Result Value Ref Range    Magnesium 1.8 1.6 - 2.4 mg/dL   URINALYSIS W/ REFLEX CULTURE    Collection Time: 02/24/17  4:22 PM   Result Value Ref Range    Color YELLOW/STRAW      Appearance CLEAR CLEAR      Specific gravity 1.026 1.003 - 1.030      pH (UA) 6.0 5.0 - 8.0      Protein 100 (A) NEG mg/dL    Glucose NEGATIVE  NEG mg/dL    Ketone 15 (A) NEG mg/dL    Bilirubin NEGATIVE  NEG      Blood NEGATIVE  NEG      Urobilinogen 0.2 0.2 - 1.0 EU/dL    Nitrites NEGATIVE  NEG      Leukocyte Esterase NEGATIVE  NEG      WBC 0-4 0 - 4 /hpf    RBC 0-5 0 - 5 /hpf    Epithelial cells FEW FEW /lpf    Bacteria NEGATIVE  NEG /hpf    UA:UC IF INDICATED CULTURE NOT INDICATED BY UA RESULT CNI      Hyaline cast 0-2 0 - 5 /lpf       Ct Head Wo Cont    Result Date: 2/24/2017  EXAM:  CT HEAD WO CONT INDICATION:   Increased confusion over the course of current week. Patient has baseline dementia. COMPARISON: Brain MRI Nicole 10, 2016. TECHNIQUE: Unenhanced CT of the head was performed using 5 mm images. Brain and bone windows were generated. CT dose reduction was achieved through use of a standardized protocol tailored for this examination and automatic exposure control for dose modulation. FINDINGS: Imaging is degraded by patient motion, particularly affecting the reformatted images. . Mild prominence of the ventricles and cortical sulci consistent with atrophy is again shown as is chronic ischemic change in the periventricular left parietal lobe. A lacunar area of diminished attenuation in the left external capsule is also noted which is probably of chronic ischemic change. There is no acute intracranial hemorrhage or mass effect. No extra-axial collection is shown. . No unenhanced CT imaging evidence for acute infarction is demonstrated. .  The basilar cisterns are open. 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 The visualized portions of the paranasal sinuses and mastoid air cells are clear. IMPRESSION: No acute findings.

## 2017-02-24 NOTE — ED PROVIDER NOTES
HPI Comments: 80 y.o. female with past medical history significant for DM and chronic pain who presents from Independent living home with chief complaint of AMS. Per relative, she was called by staff at the pt's independent living center last Friday (2/17/2017) due to feeling \"sick\" with increased confusion. The relative states the pt was seen at [facility] on Friday and diagnosed with a UTI and informed by her doctor that she had a severe bladder infection. Per relative, the pt was given antibiotics following discharge however she believes the pt has not been taking them as prescribed (only three pills have been taken from pill bottle). The pt is noted to have dementia however compared to last Friday, the pt has appeared more confused. The relative makes it known that the pt has not experienced any known falls. While in the ED, the pt is asked why she is here today in which she responds, \"Im not sure\". The pt is able to state her current place however is unsure of current year at this time. Per pt, she has not been eating. Her relative makes it known that the nurses at her independent living center have been bringing her food, however the pt has been refusing to eat. The pt denies CP, headache, vomiting, diarrhea, fever and abd pain. There are no other acute medical concerns at this time. Full history, physical exam, and ROS unable to be obtained due to:  dementia. Social hx: Former smoker, No ETOH use      PCP: Thao Wright DO    Note written by Roxana Chacon, as dictated by Rachel Turner MD 3:46 PM              The history is provided by the patient and a relative.         Past Medical History:   Diagnosis Date    Chronic pain     Peripheral neuropathy    Diabetes [facility])        Past Surgical History:   Procedure Laterality Date    ABDOMEN SURGERY PROC UNLISTED  1148    Umbilical Hernia repair    CARDIAC SURG PROCEDURE UNLIST  4/2014    valve replacement         Family History:   Problem Relation Age of Onset    Diabetes Mother     Parkinsonism Father     Diabetes Brother        Social History     Social History    Marital status:      Spouse name: N/A    Number of children: N/A    Years of education: N/A     Occupational History    Not on file. Social History Main Topics    Smoking status: Former Smoker     Packs/day: 1.00     Years: 60.00     Quit date: 3/30/2013    Smokeless tobacco: Not on file    Alcohol use No    Drug use: No    Sexual activity: Not on file     Other Topics Concern    Not on file     Social History Narrative         ALLERGIES: Prednisone    Review of Systems   Constitutional: Negative for chills and fever. HENT: Negative for sore throat. Respiratory: Negative for cough and shortness of breath. Gastrointestinal: Negative for abdominal pain, constipation, diarrhea, nausea and vomiting. Genitourinary: Negative for dysuria and hematuria. Neurological: Negative for headaches. All other systems reviewed and are negative.       Vitals:    02/24/17 1519   BP: 171/81   Pulse: 75   Resp: 22   Temp: 98 °F (36.7 °C)   SpO2: 97%   Weight: 63.5 kg (140 lb)            Physical Exam   Physical Examination: General appearance - alert, elderly, no acute distress, oriented to person, place and normal appearing weight  Eyes - pupils equal and reactive, extraocular eye movements intact  Neck - supple, no significant adenopathy  Chest - clear to auscultation, no wheezes, rales or rhonchi, symmetric air entry  Heart - normal rate, regular rhythm, normal S1, S2, no murmurs, rubs, clicks or gallops  Abdomen - soft, nontender, nondistended, no masses or organomegaly  Back exam - full range of motion, no tenderness, palpable spasm or pain on motion  Neurological - alert, oriented to person and place, normal speech, no focal findings or movement disorder noted, normal f-n-f, no nystagmus, no pronator drift  Musculoskeletal - no joint tenderness, deformity or swelling  Extremities - peripheral pulses normal, no pedal edema, no clubbing or cyanosis  Skin - normal coloration and turgor, no rashes, no suspicious skin lesions noted  MDM  Number of Diagnoses or Management Options  Dementia without behavioral disturbance, unspecified dementia type:   Hyperglycemia:      Amount and/or Complexity of Data Reviewed  Clinical lab tests: ordered and reviewed  Tests in the radiology section of CPT®: ordered and reviewed  Decide to obtain previous medical records or to obtain history from someone other than the patient: yes  Obtain history from someone other than the patient: yes (granddaughter)  Review and summarize past medical records: yes  Independent visualization of images, tracings, or specimens: yes    Patient Progress  Patient progress: stable    ED Course       Procedures  EKG interpretation: (Preliminary)  Rhythm: normal sinus rhythm; and irregular. Rate (approx.): 73; Axis: normal; AR interval: normal; QRS interval: normal ; ST/T wave: non-specific changes; prolonged QTc, occ PVCs    Nonfocal neuro exam, baseline dementia. VSS. Will d/c with pcp f/u. Pt able to ambulate at baseline in ED. Tolerating PO.

## 2017-02-24 NOTE — ED NOTES
Assumed care of pt. Bed locked and in low position with call bell within reach. Using AIDET-Introduced self as Primary RN and plan discussed with pt with understanding was verbalized. Pt denies additional complaints at this time. White board updated with this nurse's name. Patient advised that medical information will be discussed and it is their own responsibility to tell nurse if such conversation should not take place in the presence of visitors. Pt verbalizes understanding. Pt's granddaughter at bedside.

## 2017-02-24 NOTE — ED TRIAGE NOTES
Pt had a recent UTI and was at [facility] last weekend. She has been more confused than normal this week. Has baseline dementia.

## 2017-02-25 NOTE — ED NOTES
Pt declines ambulating at this time. States she is ready to leave. Dr. Shepard  notified and cancelled order of ambulation. Pt refused to have repeat vitals/pain reassessment.

## 2017-02-25 NOTE — ED NOTES
Pt given water for PO challenge and healthy choice meal per Dr. Nimisha Damian. Family remain at bedside.

## 2017-02-25 NOTE — ED NOTES
Pt discharged by provider. Pt escorted off the unit with a w/c and in NAD. Home with her granddaughter.

## 2017-03-02 ENCOUNTER — HOSPITAL ENCOUNTER (EMERGENCY)
Age: 82
Discharge: HOME | End: 2017-03-03
Attending: EMERGENCY MEDICINE
Payer: MEDICARE

## 2017-03-02 ENCOUNTER — APPOINTMENT (OUTPATIENT)
Dept: CT IMAGING | Age: 82
End: 2017-03-02
Attending: EMERGENCY MEDICINE
Payer: MEDICARE

## 2017-03-02 VITALS
HEIGHT: 62 IN | OXYGEN SATURATION: 98 % | BODY MASS INDEX: 25.76 KG/M2 | SYSTOLIC BLOOD PRESSURE: 116 MMHG | WEIGHT: 140 LBS | DIASTOLIC BLOOD PRESSURE: 47 MMHG | HEART RATE: 80 BPM | RESPIRATION RATE: 14 BRPM | TEMPERATURE: 98 F

## 2017-03-02 DIAGNOSIS — N30.00 ACUTE CYSTITIS WITHOUT HEMATURIA: Primary | ICD-10-CM

## 2017-03-02 LAB
ALBUMIN SERPL BCP-MCNC: 3.4 G/DL (ref 3.5–5)
ALBUMIN/GLOB SERPL: 1 {RATIO} (ref 1.1–2.2)
ALP SERPL-CCNC: 75 U/L (ref 45–117)
ALT SERPL-CCNC: 19 U/L (ref 12–78)
ANION GAP BLD CALC-SCNC: 11 MMOL/L (ref 5–15)
APPEARANCE UR: ABNORMAL
AST SERPL W P-5'-P-CCNC: 17 U/L (ref 15–37)
BACTERIA URNS QL MICRO: ABNORMAL /HPF
BASOPHILS # BLD AUTO: 0 K/UL (ref 0–0.1)
BASOPHILS # BLD: 0 % (ref 0–1)
BILIRUB SERPL-MCNC: 0.3 MG/DL (ref 0.2–1)
BILIRUB UR QL: NEGATIVE
BUN SERPL-MCNC: 16 MG/DL (ref 6–20)
BUN/CREAT SERPL: 14 (ref 12–20)
CALCIUM SERPL-MCNC: 9.5 MG/DL (ref 8.5–10.1)
CHLORIDE SERPL-SCNC: 102 MMOL/L (ref 97–108)
CO2 SERPL-SCNC: 27 MMOL/L (ref 21–32)
COLOR UR: ABNORMAL
CREAT SERPL-MCNC: 1.15 MG/DL (ref 0.55–1.02)
EOSINOPHIL # BLD: 0.1 K/UL (ref 0–0.4)
EOSINOPHIL NFR BLD: 1 % (ref 0–7)
EPITH CASTS URNS QL MICRO: ABNORMAL /LPF
ERYTHROCYTE [DISTWIDTH] IN BLOOD BY AUTOMATED COUNT: 14 % (ref 11.5–14.5)
GLOBULIN SER CALC-MCNC: 3.5 G/DL (ref 2–4)
GLUCOSE SERPL-MCNC: 159 MG/DL (ref 65–100)
GLUCOSE UR STRIP.AUTO-MCNC: NEGATIVE MG/DL
HCT VFR BLD AUTO: 44.4 % (ref 35–47)
HGB BLD-MCNC: 14 G/DL (ref 11.5–16)
HGB UR QL STRIP: NEGATIVE
HYALINE CASTS URNS QL MICRO: ABNORMAL /LPF (ref 0–5)
KETONES UR QL STRIP.AUTO: NEGATIVE MG/DL
LEUKOCYTE ESTERASE UR QL STRIP.AUTO: ABNORMAL
LIPASE SERPL-CCNC: 254 U/L (ref 73–393)
LYMPHOCYTES # BLD AUTO: 39 % (ref 12–49)
LYMPHOCYTES # BLD: 4.8 K/UL (ref 0.8–3.5)
MCH RBC QN AUTO: 28.9 PG (ref 26–34)
MCHC RBC AUTO-ENTMCNC: 31.5 G/DL (ref 30–36.5)
MCV RBC AUTO: 91.7 FL (ref 80–99)
MONOCYTES # BLD: 0.8 K/UL (ref 0–1)
MONOCYTES NFR BLD AUTO: 6 % (ref 5–13)
NEUTS SEG # BLD: 6.6 K/UL (ref 1.8–8)
NEUTS SEG NFR BLD AUTO: 54 % (ref 32–75)
NITRITE UR QL STRIP.AUTO: NEGATIVE
PH UR STRIP: 5 [PH] (ref 5–8)
PLATELET # BLD AUTO: 368 K/UL (ref 150–400)
POTASSIUM SERPL-SCNC: 3.6 MMOL/L (ref 3.5–5.1)
PROT SERPL-MCNC: 6.9 G/DL (ref 6.4–8.2)
PROT UR STRIP-MCNC: NEGATIVE MG/DL
RBC # BLD AUTO: 4.84 M/UL (ref 3.8–5.2)
RBC #/AREA URNS HPF: ABNORMAL /HPF (ref 0–5)
SODIUM SERPL-SCNC: 140 MMOL/L (ref 136–145)
SP GR UR REFRACTOMETRY: 1.02 (ref 1–1.03)
UROBILINOGEN UR QL STRIP.AUTO: 0.2 EU/DL (ref 0.2–1)
WBC # BLD AUTO: 12.4 K/UL (ref 3.6–11)
WBC URNS QL MICRO: ABNORMAL /HPF (ref 0–4)

## 2017-03-02 PROCEDURE — 96360 HYDRATION IV INFUSION INIT: CPT

## 2017-03-02 PROCEDURE — 36415 COLL VENOUS BLD VENIPUNCTURE: CPT | Performed by: EMERGENCY MEDICINE

## 2017-03-02 PROCEDURE — 96361 HYDRATE IV INFUSION ADD-ON: CPT

## 2017-03-02 PROCEDURE — 87186 SC STD MICRODIL/AGAR DIL: CPT | Performed by: EMERGENCY MEDICINE

## 2017-03-02 PROCEDURE — 74011250637 HC RX REV CODE- 250/637: Performed by: EMERGENCY MEDICINE

## 2017-03-02 PROCEDURE — 74176 CT ABD & PELVIS W/O CONTRAST: CPT

## 2017-03-02 PROCEDURE — 74011000250 HC RX REV CODE- 250: Performed by: EMERGENCY MEDICINE

## 2017-03-02 PROCEDURE — 87077 CULTURE AEROBIC IDENTIFY: CPT | Performed by: EMERGENCY MEDICINE

## 2017-03-02 PROCEDURE — 96372 THER/PROPH/DIAG INJ SC/IM: CPT

## 2017-03-02 PROCEDURE — 81001 URINALYSIS AUTO W/SCOPE: CPT | Performed by: EMERGENCY MEDICINE

## 2017-03-02 PROCEDURE — 87086 URINE CULTURE/COLONY COUNT: CPT | Performed by: EMERGENCY MEDICINE

## 2017-03-02 PROCEDURE — 80053 COMPREHEN METABOLIC PANEL: CPT | Performed by: EMERGENCY MEDICINE

## 2017-03-02 PROCEDURE — 74011250636 HC RX REV CODE- 250/636: Performed by: EMERGENCY MEDICINE

## 2017-03-02 PROCEDURE — 99285 EMERGENCY DEPT VISIT HI MDM: CPT

## 2017-03-02 PROCEDURE — 83690 ASSAY OF LIPASE: CPT | Performed by: EMERGENCY MEDICINE

## 2017-03-02 PROCEDURE — 85025 COMPLETE CBC W/AUTO DIFF WBC: CPT | Performed by: EMERGENCY MEDICINE

## 2017-03-02 RX ORDER — HYDROCODONE BITARTRATE AND ACETAMINOPHEN 5; 325 MG/1; MG/1
1 TABLET ORAL
Status: DISCONTINUED | OUTPATIENT
Start: 2017-03-02 | End: 2017-03-03 | Stop reason: HOSPADM

## 2017-03-02 RX ORDER — ACETAMINOPHEN 325 MG/1
975 TABLET ORAL
Status: COMPLETED | OUTPATIENT
Start: 2017-03-02 | End: 2017-03-02

## 2017-03-02 RX ORDER — CEFDINIR 300 MG/1
300 CAPSULE ORAL 2 TIMES DAILY
Qty: 20 CAP | Refills: 0 | Status: SHIPPED | OUTPATIENT
Start: 2017-03-02 | End: 2017-03-12

## 2017-03-02 RX ORDER — SODIUM CHLORIDE 9 MG/ML
1000 INJECTION, SOLUTION INTRAVENOUS ONCE
Status: COMPLETED | OUTPATIENT
Start: 2017-03-02 | End: 2017-03-02

## 2017-03-02 RX ADMIN — CEFTRIAXONE SODIUM 1 G: 1 INJECTION, POWDER, FOR SOLUTION INTRAMUSCULAR; INTRAVENOUS at 23:12

## 2017-03-02 RX ADMIN — SODIUM CHLORIDE 1000 ML: 900 INJECTION, SOLUTION INTRAVENOUS at 18:44

## 2017-03-02 RX ADMIN — ACETAMINOPHEN 975 MG: 325 TABLET ORAL at 23:09

## 2017-03-02 NOTE — ED TRIAGE NOTES
Pt states she lives in an independent living facility called [facility]. EMS states pt has been to several facilities for the same symptoms.

## 2017-03-02 NOTE — ED TRIAGE NOTES
Pt states having abd pain with vomiting for momo one week. Pt states she had a bowel movement  With \"alot of vomiting today\".

## 2017-03-02 NOTE — Clinical Note
Omnicef:1 pill every 12 hours for 10 days Tylenol for pain. Return to ER for any fever, inability to eat or drink, inability to take your antibiotics.

## 2017-03-03 NOTE — ED PROVIDER NOTES
HPI Comments: 22-year-old female presents to emergency department for evaluation of abdominal pain. Patient states this has been ongoing for several weeks. Patient reports onset of pain began again yesterday. Patient reports intermittent vomiting. No diarrhea. Patient has been seen and treated an outside emergency room twice before. Patient states she takes Tylenol for pain. This provides no relief. As noted patient is in intermittent nonbloody vomiting. No diarrhea. Patient does report a history of constipation. No cough, congestion, runny nose or sore throat. No muscle aches or body aches. No chest pain or shortness of breath. No difficulty breathing. No known precipitating events. No alleviating factors. Pain described as sharp. No radiation. Pain 8/10. Social hx  Nonsmoker  No alcohol    Patient is a 80 y.o. female presenting with abdominal pain and vomiting. The history is provided by the patient. Abdominal Pain    Associated symptoms include nausea. Pertinent negatives include no fever, no diarrhea, no vomiting, no dysuria, no frequency, no headaches, no arthralgias, no myalgias and no chest pain. Vomiting    Associated symptoms include abdominal pain. Pertinent negatives include no chills, no fever, no diarrhea, no headaches, no arthralgias, no myalgias, no cough and no headaches.         Past Medical History:   Diagnosis Date    Chronic pain     Peripheral neuropathy    Dementia     Diabetes ([address]     Parkinson's disease [address] [address]        Past Surgical History:   Procedure Laterality Date    ABDOMEN SURGERY PROC UNLISTED  4380    Umbilical Hernia repair    CARDIAC SURG PROCEDURE UNLIST  4/2014    valve replacement         Family History:   Problem Relation Age of Onset    Diabetes Mother     Parkinsonism Father     Diabetes Brother        Social History     Social History    Marital status:      Spouse name: N/A    Number of children: N/A    Years of education: N/A     Occupational History    Not on file. Social History Main Topics    Smoking status: Former Smoker     Packs/day: 1.00     Years: 60.00     Quit date: 3/30/2013    Smokeless tobacco: Not on file    Alcohol use No    Drug use: No    Sexual activity: Not on file     Other Topics Concern    Not on file     Social History Narrative         ALLERGIES: Prednisone    Review of Systems   Constitutional: Negative for chills and fever. Respiratory: Negative for cough and shortness of breath. Cardiovascular: Negative for chest pain and palpitations. Gastrointestinal: Positive for abdominal pain and nausea. Negative for blood in stool, diarrhea and vomiting. Genitourinary: Negative for dysuria, flank pain, frequency and urgency. Musculoskeletal: Negative for arthralgias, myalgias, neck pain and neck stiffness. Skin: Negative for rash and wound. Neurological: Negative for dizziness, numbness and headaches. All other systems reviewed and are negative. Vitals:    03/02/17 1819   BP: 116/47   Pulse: 80   Resp: 14   Temp: 98 °F (36.7 °C)   SpO2: 98%   Weight: 63.5 kg (140 lb)   Height: 5' 2\" (1.575 m)            Physical Exam   Constitutional: She is oriented to person, place, and time. She appears well-developed and well-nourished. No distress. HENT:   Head: Normocephalic and atraumatic. Right Ear: External ear normal.   Left Ear: External ear normal.   Eyes: Conjunctivae and EOM are normal. Pupils are equal, round, and reactive to light. Neck: Normal range of motion. Neck supple. Cardiovascular: Normal rate and regular rhythm. Pulmonary/Chest: Effort normal and breath sounds normal. No respiratory distress. She has no wheezes. Abdominal: Soft. Normal appearance and bowel sounds are normal. She exhibits no shifting dullness, no distension, no pulsatile liver, no abdominal bruit, no pulsatile midline mass and no mass. There is no hepatosplenomegaly, splenomegaly or hepatomegaly. There is no tenderness. There is no rigidity, no rebound, no guarding, no CVA tenderness, no tenderness at McBurney's point and negative Robledo's sign. Abdomen exposed for exam.  Abdomen soft, pt reports generalized pain on exam.  No focal point of pain. Musculoskeletal: Normal range of motion. She exhibits no edema or tenderness. Neurological: She is alert and oriented to person, place, and time. She has normal reflexes. No cranial nerve deficit. Coordination normal.   Skin: Skin is warm and dry. No rash noted. No erythema. Psychiatric: She has a normal mood and affect. Her behavior is normal. Judgment and thought content normal.   Nursing note and vitals reviewed. MDM  Number of Diagnoses or Management Options  Acute cystitis without hematuria:   Diagnosis management comments: 81 yo female presenting to ER for abdominal pain intermittent vomiting. No fever. Diffuse tenderness on exam.  P: labs, CT    10:58 PM  Patient is well hydrated, well appearing, and in no respiratory distress. Pt tolerating po fluids and crackers. Physical exam is reassuring, and without signs of serious illness. Given the patient's history, clinical course, physical exam, and imaging findings, abdominal pain is unlikely secondary to a surgical etiology. Patient will be discharged home with pain control and follow-up with primary care physician in one to two days. Patient and caregivers were instructed on signs and symptoms of reasons to return including fever, worsening pain, vomiting, blood in the stool or any other concerns. Will treat urine pending urine culture    Patient's results have been reviewed with them. Patient and/or family have verbally conveyed their understanding and agreement of the patient's signs, symptoms, diagnosis, treatment and prognosis and additionally agree to follow up as recommended or return to the Emergency Room should their condition change prior to follow-up.   Discharge instructions have also been provided to the patient with some educational information regarding their diagnosis as well a list of reasons why they would want to return to the ER prior to their follow-up appointment should their condition change. ED Course       Procedures                 Pt case including HPI, PE, and all available lab and radiology results has been discussed with attending physician. Opportunity to evaluate patient has been provided to ER attending. Discharge and prescription plan has been agreed upon.

## 2017-03-03 NOTE — ED NOTES
Pt asked why she wasn't being admitted, Letitai Fu reviewed results and plan of care with patient. Pt verbalized understanding of prescription and d/c plan. Pt stated, \" I won't be in any better shape going home then if I stay here. I don't have transportation anywhere and I'm just so tired. \" Reviewed plan again with patient, pt verbalized understanding again. Pt assisted outside to taxi and verbalized understanding.

## 2017-03-03 NOTE — ED NOTES
Pt provided with water to drink, pt requesting snack, Dr. Janell Lanes requested pt wait for solids until test results returned. Pt attempted to provided urine sample, pt had BM instead, provider made aware.

## 2017-03-03 NOTE — ED NOTES
Bedside and Verbal shift change report given to Nirmala Zaidi RN (oncoming nurse) by Akash hollis RN (offgoing nurse). Report included the following information SBAR, ED Summary, MAR and Recent Results.

## 2017-03-03 NOTE — ED NOTES
Assumed care of patient at this time from Zuleyma Argueta RN. Introduced self to patient. Pt updated on plan. Call light within reach.

## 2017-03-03 NOTE — DISCHARGE INSTRUCTIONS
We hope that we have addressed all of your medical concerns. The examination and treatment you received in the Emergency Department were for an emergent problem and were not intended as complete care. It is important that you follow up with your healthcare provider(s) for ongoing care. If your symptoms worsen or do not improve as expected, and you are unable to reach your usual health care provider(s), you should return to the Emergency Department. Today's healthcare is undergoing tremendous change, and patient satisfaction surveys are one of the many tools to assess the quality of medical care. You may receive a survey from the [employer] regarding your experience in the Emergency Department. I hope that your experience has been completely positive, particularly the medical care that I provided. As such, please participate in the survey; anything less than excellent does not meet my expectations or intentions. [facility] and [address] [place] [address] participate in nationally recognized quality of care measures. If your blood pressure is greater than 120/80, as reported below, we urge that you seek medical care to address the potential of high blood pressure, commonly known as hypertension. Hypertension can be hereditary or can be caused by certain medical conditions, pain, stress, or \"white coat syndrome. \"       Please make an appointment with your health care provider(s) for follow up of your Emergency Department visit. VITALS:   Patient Vitals for the past 8 hrs:   Temp Pulse Resp BP SpO2   03/02/17 1819 98 °F (36.7 °C) 80 14 116/47 98 %          Thank you for allowing us to provide you with medical care today. We realize that you have many choices for your emergency care needs. Please choose us in the future for any continued health care needs. Ulysees Boas Sharen [place], [address] [place] [address] [address]   Office: [phone]            Recent Results (from the past 24 hour(s))   CBC WITH AUTOMATED DIFF    Collection Time: 03/02/17  6:41 PM   Result Value Ref Range    WBC 12.4 (H) 3.6 - 11.0 K/uL    RBC 4.84 3.80 - 5.20 M/uL    HGB 14.0 11.5 - 16.0 g/dL    HCT 44.4 35.0 - 47.0 %    MCV 91.7 80.0 - 99.0 FL    MCH 28.9 26.0 - 34.0 PG    MCHC 31.5 30.0 - 36.5 g/dL    RDW 14.0 11.5 - 14.5 %    PLATELET 491 646 - 077 K/uL    NEUTROPHILS 54 32 - 75 %    LYMPHOCYTES 39 12 - 49 %    MONOCYTES 6 5 - 13 %    EOSINOPHILS 1 0 - 7 %    BASOPHILS 0 0 - 1 %    ABS. NEUTROPHILS 6.6 1.8 - 8.0 K/UL    ABS. LYMPHOCYTES 4.8 (H) 0.8 - 3.5 K/UL    ABS. MONOCYTES 0.8 0.0 - 1.0 K/UL    ABS. EOSINOPHILS 0.1 0.0 - 0.4 K/UL    ABS. BASOPHILS 0.0 0.0 - 0.1 K/UL   METABOLIC PANEL, COMPREHENSIVE    Collection Time: 03/02/17  6:41 PM   Result Value Ref Range    Sodium 140 136 - 145 mmol/L    Potassium 3.6 3.5 - 5.1 mmol/L    Chloride 102 97 - 108 mmol/L    CO2 27 21 - 32 mmol/L    Anion gap 11 5 - 15 mmol/L    Glucose 159 (H) 65 - 100 mg/dL    BUN 16 6 - 20 MG/DL    Creatinine 1.15 (H) 0.55 - 1.02 MG/DL    BUN/Creatinine ratio 14 12 - 20      GFR est AA 54 (L) >60 ml/min/1.73m2    GFR est non-AA 45 (L) >60 ml/min/1.73m2    Calcium 9.5 8.5 - 10.1 MG/DL    Bilirubin, total 0.3 0.2 - 1.0 MG/DL    ALT (SGPT) 19 12 - 78 U/L    AST (SGOT) 17 15 - 37 U/L    Alk.  phosphatase 75 45 - 117 U/L    Protein, total 6.9 6.4 - 8.2 g/dL    Albumin 3.4 (L) 3.5 - 5.0 g/dL    Globulin 3.5 2.0 - 4.0 g/dL    A-G Ratio 1.0 (L) 1.1 - 2.2     LIPASE    Collection Time: 03/02/17  6:41 PM   Result Value Ref Range    Lipase 254 73 - 393 U/L   URINALYSIS W/MICROSCOPIC    Collection Time: 03/02/17 10:16 PM   Result Value Ref Range    Color YELLOW/STRAW      Appearance CLOUDY (A) CLEAR      Specific gravity 1.017 1.003 - 1.030      pH (UA) 5.0 5.0 - 8.0      Protein NEGATIVE  NEG mg/dL    Glucose NEGATIVE  NEG mg/dL    Ketone NEGATIVE  NEG mg/dL    Bilirubin NEGATIVE  NEG      Blood NEGATIVE  NEG      Urobilinogen 0.2 0.2 - 1.0 EU/dL    Nitrites NEGATIVE  NEG      Leukocyte Esterase MODERATE (A) NEG      WBC 20-50 0 - 4 /hpf    RBC 0-5 0 - 5 /hpf    Epithelial cells FEW FEW /lpf    Bacteria 2+ (A) NEG /hpf    Hyaline cast 2-5 0 - 5 /lpf       Ct Abd Pelv Wo Cont    Result Date: 3/2/2017  EXAM: CT ABDOMEN AND PELVIS WITHOUT CONTRAST INDICATION:  Abdominal pain and vomiting. COMPARISON: None. CONTRAST: None. Contrast was not administered because of the patient's acute decline in renal function. TECHNIQUE: Unenhanced multislice helical CT was performed from the diaphragm to the symphysis pubis without intravenous contrast administration. Oral contrast was not administered. Contiguous 5 mm axial images were reconstructed and lung and soft tissue windows were generated. Coronal and sagittal reformations were generated. CT dose reduction was achieved through use of a standardized protocol tailored for this examination and automatic exposure control for dose modulation. FINDINGS: The patient is status post median sternotomy and mitral valve replacement. LOWER CHEST: The visualized portions of the lung bases are clear. The absence of intravenous contrast material reduces the sensitivity for evaluation of the solid parenchymal organs of the abdomen. ABDOMEN: Liver: The liver is normal in size and contour with no focal abnormality. Gallbladder and bile ducts: There are no calcified stones and there is no biliary duct dilatation. Spleen: No abnormality. Pancreas: No abnormality. Adrenal glands: No abnormality. Kidneys: No focal parenchymal abnormality. There is no renal or ureteral calculus or obstruction. PELVIS: Reproductive organs: The uterus is small. Bladder: No abnormality. RETROPERITONEUM: The aorta is atherosclerotic and tapers without aneurysm. There is no retroperitoneal adenopathy or mass. There is no pelvic mass or adenopathy.  BOWEL AND MESENTERY: The small bowel is normal. The appendix is not identified but there is no inflammatory process adjacent to the cecum. PERITONEUM: There is no ascites or free intraperitoneal air. BONES AND SOFT TISSUES: There is dextroconvex scoliosis and degenerative change of the lumbar spine. IMPRESSION: 1. No acute abdominal or pelvic abnormality. 2. Status post median sternotomy and mitral valve replacement. 3. Atherosclerotic abdominal aorta without aneurysm. 4. Dextroconvex lumbar scoliosis and spondylosis. Urinary Tract Infection in Women: Care Instructions  Your Care Instructions    A urinary tract infection, or UTI, is a general term for an infection anywhere between the kidneys and the urethra (where urine comes out). Most UTIs are bladder infections. They often cause pain or burning when you urinate. UTIs are caused by bacteria and can be cured with antibiotics. Be sure to complete your treatment so that the infection goes away. Follow-up care is a key part of your treatment and safety. Be sure to make and go to all appointments, and call your doctor if you are having problems. It's also a good idea to know your test results and keep a list of the medicines you take. How can you care for yourself at home? · Take your antibiotics as directed. Do not stop taking them just because you feel better. You need to take the full course of antibiotics. · Drink extra water and other fluids for the next day or two. This may help wash out the bacteria that are causing the infection. (If you have kidney, heart, or liver disease and have to limit fluids, talk with your doctor before you increase your fluid intake.)  · Avoid drinks that are carbonated or have caffeine. They can irritate the bladder. · Urinate often. Try to empty your bladder each time. · To relieve pain, take a hot bath or lay a heating pad set on low over your lower belly or genital area. Never go to sleep with a heating pad in place. To prevent UTIs  · Drink plenty of water each day. This helps you urinate often, which clears bacteria from your system. (If you have kidney, heart, or liver disease and have to limit fluids, talk with your doctor before you increase your fluid intake.)  · Consider adding cranberry juice to your diet. · Urinate when you need to. · Urinate right after you have sex. · Change sanitary pads often. · Avoid douches, bubble baths, feminine hygiene sprays, and other feminine hygiene products that have deodorants. · After going to the bathroom, wipe from front to back. When should you call for help? Call your doctor now or seek immediate medical care if:  · Symptoms such as fever, chills, nausea, or vomiting get worse or appear for the first time. · You have new pain in your back just below your rib cage. This is called flank pain. · There is new blood or pus in your urine. · You have any problems with your antibiotic medicine. Watch closely for changes in your health, and be sure to contact your doctor if:  · You are not getting better after taking an antibiotic for 2 days. · Your symptoms go away but then come back. Where can you learn more? Go to http://marina-duglas.info/. Enter B926 in the search box to learn more about \"Urinary Tract Infection in Women: Care Instructions. \"  Current as of: August 12, 2016  Content Version: 11.1  © 2296-2041 [employer]. Care instructions adapted under license by [employer] (which disclaims liability or warranty for this information). If you have questions about a medical condition or this instruction, always ask your healthcare professional. [place] [address] any warranty or liability for your use of this information.

## 2017-03-03 NOTE — ED NOTES
Attempted to contact Emergency Contact, no answer from Ashwini Shah's phone. Pt stated she has no other family/ friends to call. Pt to return home via taxi.

## 2017-03-03 NOTE — ED NOTES
[address] [place] [address] assisted living, spoke to Javier Lopez, pt can enter facility by buzzing in through front door. Pt made aware of this.

## 2017-03-05 LAB
BACTERIA SPEC CULT: ABNORMAL
CC UR VC: ABNORMAL
SERVICE CMNT-IMP: ABNORMAL

## 2017-03-06 RX ORDER — AMOXICILLIN 500 MG/1
500 TABLET, FILM COATED ORAL 2 TIMES DAILY
Qty: 20 TAB | Refills: 0 | Status: SHIPPED | OUTPATIENT
Start: 2017-03-06 | End: 2017-03-16

## 2017-03-06 NOTE — PROGRESS NOTES
Spoke with patient. She is improving but no completely better. i informed her to stop omnicef.  [address] for amoxicillin 500 mg bid x 10 d to [facility] on [address]

## 2017-03-27 ENCOUNTER — TELEPHONE (OUTPATIENT)
Dept: NEUROLOGY | Age: 82
End: 2017-03-27

## 2017-03-27 NOTE — TELEPHONE ENCOUNTER
Attempted to call unsuccessful message left on vm letting patient know she may call back and make first avail appt with dr Karo Tucker

## 2017-03-27 NOTE — TELEPHONE ENCOUNTER
Patient says she needs a sooner appointment, she is having trouble with her eyes i told her we don't have anything with lucian until may right now and i also offered her the soonest with teofilo (tomorrow) and she argued with me saying she will NOT see the NP nor will she wait till may.  Please call

## 2017-03-28 ENCOUNTER — OFFICE VISIT (OUTPATIENT)
Dept: NEUROLOGY | Age: 82
End: 2017-03-28

## 2017-03-28 VITALS
HEART RATE: 74 BPM | OXYGEN SATURATION: 92 % | BODY MASS INDEX: 23.78 KG/M2 | DIASTOLIC BLOOD PRESSURE: 52 MMHG | WEIGHT: 130 LBS | SYSTOLIC BLOOD PRESSURE: 128 MMHG

## 2017-03-28 DIAGNOSIS — R25.1 TREMOR: Primary | ICD-10-CM

## 2017-03-28 DIAGNOSIS — H04.123 DRY EYES: ICD-10-CM

## 2017-03-28 NOTE — MR AVS SNAPSHOT
Visit Information Date & Time Provider Department Dept. Phone Encounter #  
 3/28/2017  2:40 PM Marylen Gale, MD Neurology [facility] [phone] [id number] Follow-up Instructions Return if symptoms worsen or fail to improve. Upcoming Health Maintenance Date Due DTaP/Tdap/Td series (1 - Tdap) 10/17/1953 ZOSTER VACCINE AGE 60> 10/17/1992 OSTEOPOROSIS SCREENING (DEXA) 10/17/1997 Pneumococcal 65+ Low/Medium Risk (1 of 2 - PCV13) 10/17/1997 MEDICARE YEARLY EXAM 10/17/1997 INFLUENZA AGE 9 TO ADULT 8/1/2016 GLAUCOMA SCREENING Q2Y 1/24/2019 Allergies as of 3/28/2017  Review Complete On: 3/28/2017 By: Marylen Gale, MD  
  
 Severity Noted Reaction Type Reactions Prednisone  04/15/2014    Other (comments) \"I have the flu; I'm not going to take it\" Current Immunizations  Never Reviewed No immunizations on file. Not reviewed this visit You Were Diagnosed With   
  
 Codes Comments Tremor    -  Primary ICD-10-CM: R25.1 ICD-9-CM: 781.0 Dry eyes     ICD-10-CM: D64.558 ICD-9-CM: 375.15 Vitals BP Pulse Weight(growth percentile) SpO2 BMI OB Status 128/52 74 130 lb (59 kg) 92% 23.78 kg/m2 Postmenopausal  
 Smoking Status Former Smoker BMI and BSA Data Body Mass Index Body Surface Area 23.78 kg/m 2 1.61 m 2 Preferred Pharmacy Pharmacy Name Phone [id number] - [phone] [facility], [address] [place] [address] [phone] Your Updated Medication List  
  
   
This list is accurate as of: 3/28/17  3:56 PM.  Always use your most recent med list.  
  
  
  
  
 albuterol 2.5 mg /3 mL (0.083 %) nebulizer solution Commonly known as:  PROVENTIL VENTOLIN  
2.5 mg by Nebulization route four (4) times daily. amiodarone 200 mg tablet Commonly known as:  CORDARONE Take  by mouth.   
  
 clotrimazole-betamethasone topical cream  
Commonly known as:  Lizette Arita  
 Apply  to affected area two (2) times a day. Apply to affected area COSAMIN DS PO Take  by mouth.  
  
 ergocalciferol 50,000 unit capsule Commonly known as:  ERGOCALCIFEROL Take 50,000 Units by mouth. FISH OIL 1,000 mg Cap Generic drug:  omega-3 fatty acids-vitamin e Take 1 Cap by mouth. Flaxseed Oil Oil  
by Does Not Apply route. * gabapentin 400 mg capsule Commonly known as:  NEURONTIN Take 1,600 mg by mouth nightly. * gabapentin 600 mg tablet Commonly known as:  NEURONTIN Take one tablet by mouth three times a day GINKOBA PO Take  by mouth. glipiZIDE-metFORMIN 2.5-500 mg per tablet Commonly known as:  METAGLIP Take 1 Tab by mouth Before breakfast and dinner. mometasone-formoterol 200-5 mcg/actuation HFA inhaler Commonly known as:  Mardeen Fuse Take 2 Puffs by inhalation two (2) times a day. mupirocin 2 % ointment Commonly known as:  [employer] Apply  to affected area three (3) times daily. Apply to area for 10 days  
  
 nortriptyline 10 mg capsule Commonly known as:  PAMELOR Take 40 mg by mouth nightly. OCUVITE PRESERVISION PO Take 1 Cap by mouth daily. PriLOSEC 20 mg capsule Generic drug:  omeprazole Take 20 mg by mouth two (2) times a day. propranolol 20 mg tablet Commonly known as:  INDERAL Take 1 Tab by mouth three (3) times daily. RESTORIL 30 mg capsule Generic drug:  temazepam  
Take  by mouth nightly as needed for Sleep. SIMVASTATIN PO Take  by mouth daily. TYLENOL EXTRA STRENGTH PO Take  by mouth.  
  
 warfarin 2 mg tablet Commonly known as:  COUMADIN Take 2 mg by mouth every evening. * Notice: This list has 2 medication(s) that are the same as other medications prescribed for you. Read the directions carefully, and ask your doctor or other care provider to review them with you. We Performed the Following REFERRAL TO OPHTHALMOLOGY [REF57 Custom] Comments:  
 Please evaluate patient for evaluation of dry eyes and eye pain. Will refer to [facility] where she has regular follow-ups for age-related macular degeneration. Gwendolyn Bain Follow-up Instructions Return if symptoms worsen or fail to improve. Referral Information Referral ID Referred By Referred To  
  
 [id number] [facility] Not Available Visits Status Start Date End Date 1 New Request 3/28/17 3/28/18 If your referral has a status of pending review or denied, additional information will be sent to support the outcome of this decision. Patient Instructions PRESCRIPTION REFILL POLICY [facility] Statement to Patients April 1, 2014 In an effort to ensure the large volume of patient prescription refills is processed in the most efficient and expeditious manner, we are asking our patients to assist us by calling your Pharmacy for all prescription refills, this will include also your  Mail Order Pharmacy. The pharmacy will contact our office electronically to continue the refill process. Please do not wait until the last minute to call your pharmacy. We need at least 48 hours (2days) to fill prescriptions. We also encourage you to call your pharmacy before going to  your prescription to make sure it is ready. With regard to controlled substance prescription refill requests (narcotic refills) that need to be picked up at our office, we ask your cooperation by providing us with at least 72 hours (3days) notice that you will need a refill. We will not refill narcotic prescription refill requests after 4:00pm on any weekday, Monday through Thursday, or after 2:00pm on Fridays, or on the weekends.   
  
We encourage everyone to explore another way of getting your prescription refill request processed using [employer], our patient web portal through our electronic medical record system. [employer]Digital is an efficient and effective way to communicate your medication request directly to the office and  downloadable as an momo on your smart phone . [employer] also features a review functionality that allows you to view your medication list as well as leave messages for your physician. Are you ready to get connected? If so please review the attatched instructions or speak to any of our staff to get you set up right away! Thank you so much for your cooperation. Should you have any questions please contact our Practice Administrator. The Physicians and Staff,  [facility] Current tremors appear to have a closest approximation to an essential type tremor. Has refills on the propranolol and is welcome to try it again once she feels better. Introducing [facility]! [facility] introduces [employer]-Digital patient portal. Now you can access parts of your medical record, email your doctor's office, and request medication refills online. 1. In your internet browser, go to [email or web address] 2. Click on the First Time User? Click Here link in the Sign In box. You will see the New Member Sign Up page. 3. Enter your Pin-Digital Access Code exactly as it appears below. You will not need to use this code after youve completed the sign-up process. If you do not sign up before the expiration date, you must request a new code. · Pin-Digital Access Code: [email or web address] Expires: 4/24/2017  4:44 PM 
 
4. Enter the last four digits of your Social Security Number (xxxx) and Date of Birth (mm/dd/yyyy) as indicated and click Submit. You will be taken to the next sign-up page. 5. Create a Orchestratet ID. This will be your Pin-Digital login ID and cannot be changed, so think of one that is secure and easy to remember. 6. Create a [employer] password. You can change your password at any time. 7. Enter your Password Reset Question and Answer. This can be used at a later time if you forget your password. 8. Enter your e-mail address. You will receive e-mail notification when new information is available in [address] 9. Click Sign Up. You can now view and download portions of your medical record. 10. Click the Download Summary menu link to download a portable copy of your medical information. If you have questions, please visit the Frequently Asked Questions section of the [employer] website. Remember, [facility] is NOT to be used for urgent needs. For medical emergencies, dial 911. Now available from your iPhone and Android! Please provide this summary of care documentation to your next provider. Your primary care clinician is listed as Mariluz Abdul. If you have any questions after today's visit, please call [phone].

## 2017-03-28 NOTE — PROGRESS NOTES
Neurology Consult      Subjective:      Cristina Gaona is a 80 y.o. female Who returns from last visit where essential tremor was entertained as an explanation for head and upper extremity movements. Issue propranolol low-dose but it does not sound like she ever got a chance to use it because shortly developed a urinary tract infection and said she just did not feel well. I suggested when she feels better let's give this medicine a chance and we can go from there in terms of what needs to be done after the drug challenge. Sent here for concerns of Parkinson's but I see nothing that suggests that diagnosis in any confidence sense. Will pend the revisit until this propranolol can be given a fair trial.  Is actually more anxious consistently and intensely about her dry eyes and eye pain as she calls it. Currently sees a retinal specialist at [facility] for the age-related macular degeneration. Current Outpatient Prescriptions   Medication Sig Dispense Refill    gabapentin (NEURONTIN) 600 mg tablet Take one tablet by mouth three times a day      glipiZIDE-metFORMIN (METAGLIP) 2.5-500 mg per tablet Take 1 Tab by mouth Before breakfast and dinner.  nortriptyline (PAMELOR) 10 mg capsule Take 40 mg by mouth nightly.  amiodarone (CORDARONE) 200 mg tablet Take  by mouth.  temazepam (RESTORIL) 30 mg capsule Take  by mouth nightly as needed for Sleep.  ergocalciferol (ERGOCALCIFEROL) 50,000 unit capsule Take 50,000 Units by mouth.  GLUCOSAMINE/CHONDRO QUEZADA A (COSAMIN DS PO) Take  by mouth.  GINKGO BILOBA (GINKOBA PO) Take  by mouth.  ACETAMINOPHEN (TYLENOL EXTRA STRENGTH PO) Take  by mouth.  mometasone-formoterol (DULERA) 200-5 mcg/actuation HFA inhaler Take 2 Puffs by inhalation two (2) times a day.  albuterol (PROVENTIL VENTOLIN) 2.5 mg /3 mL (0.083 %) nebulizer solution 2.5 mg by Nebulization route four (4) times daily.       VIT A/VIT C/VIT E/ZINC/COPPER (OCUVITE PRESERVISION PO) Take 1 Cap by mouth daily.  omeprazole (PRILOSEC) 20 mg capsule Take 20 mg by mouth two (2) times a day.  SIMVASTATIN PO Take  by mouth daily.  gabapentin (NEURONTIN) 400 mg capsule Take 1,600 mg by mouth nightly.  propranolol (INDERAL) 20 mg tablet Take 1 Tab by mouth three (3) times daily. 90 Tab 6    mupirocin (BACTROBAN) 2 % ointment Apply  to affected area three (3) times daily. Apply to area for 10 days 22 g 0    clotrimazole-betamethasone (LOTRISONE) topical cream Apply  to affected area two (2) times a day. Apply to affected area 1 Tube 0    Flaxseed Oil oil by Does Not Apply route.  omega-3 fatty acids-vitamin e (FISH OIL) 1,000 mg cap Take 1 Cap by mouth.  warfarin (COUMADIN) 2 mg tablet Take 2 mg by mouth every evening. Allergies   Allergen Reactions    Prednisone Other (comments)     \"I have the flu; I'm not going to take it\"     Past Medical History:   Diagnosis Date    Chronic pain     Peripheral neuropathy    Dementia     [facility] [address]     Parkinson's disease [address] [address]       Past Surgical History:   Procedure Laterality Date    ABDOMEN SURGERY PROC UNLISTED  2234    Umbilical Hernia repair    CARDIAC SURG PROCEDURE UNLIST  4/2014    valve replacement      Social History     Social History    Marital status:      Spouse name: N/A    Number of children: N/A    Years of education: N/A     Occupational History    Not on file.      Social History Main Topics    Smoking status: Former Smoker     Packs/day: 1.00     Years: 60.00     Quit date: 3/30/2013    Smokeless tobacco: Not on file    Alcohol use No    Drug use: No    Sexual activity: Not on file     Other Topics Concern    Not on file     Social History Narrative      Family History   Problem Relation Age of Onset    Diabetes Mother     Parkinsonism Father     Diabetes Brother       Visit Vitals    /52    Pulse 74    Wt 59 kg (130 lb)    SpO2 92%    BMI 23.78 kg/m2        Review of Systems:   A comprehensive review of systems was negative except for that written in the HPI. Neuro Exam:     Appearance: The patient is well developed, well nourished, provides a coherent history and is in no acute distress. Mental Status: Oriented to time, place and person. Mood and affect appropriate. Cranial Nerves:   Intact visual fields. Fundi are benign on the right and has pigmentary degeneration of left macula area. SASKIA and left pupil is not as brisk as the right, EOM's full, no nystagmus, no ptosis. Facial sensation is normal. Corneal reflexes are intact. Facial movement is symmetric. Hearing is normal bilaterally. Palate is midline with normal sternocleidomastoid and trapezius muscles are normal. Tongue is midline. Motor:  5/5 strength in upper and lower proximal and distal muscles. Normal bulk and tone. No fasciculations. Reflexes:   Deep tendon reflexes 2+/4 and symmetrical.   Sensory:   Normal to touch, pinprick and vibration. Gait:  Normal gait. Tremor:    has a gross head tremor at times and occasional postural and kinetic tremor of the upper extremities. Cerebellar:  No cerebellar signs present. Neurovascular:  Normal heart sounds and regular rhythm, peripheral pulses intact, and no carotid bruits. Assessment:   Problem 1 tremors. This still looks like a closest approximation to essential tremor. Said she has not gotten into using the propranolol because recently had a UTI and did not feel well. I suggested once she feels better less get this medicine a trial to see if it suppresses the tremor. Complains bitterly of dry eyes and eye pain. Will refer to the eye doctors that see her about her age-related macular degeneration etc.  Hopefully there is additional help to this end.       Plan:   Above plan of action and will pend the revisit until she has had a chance to give the propranolol trial for the essential tremor challenge.   Signed by :  Paulette Chakraborty MD

## 2017-03-28 NOTE — PATIENT INSTRUCTIONS
[facility]   Statement to Patients  April 1, 2014      In an effort to ensure the large volume of patient prescription refills is processed in the most efficient and expeditious manner, we are asking our patients to assist us by calling your Pharmacy for all prescription refills, this will include also your  Mail Order Pharmacy. The pharmacy will contact our office electronically to continue the refill process. Please do not wait until the last minute to call your pharmacy. We need at least 48 hours (2days) to fill prescriptions. We also encourage you to call your pharmacy before going to  your prescription to make sure it is ready. With regard to controlled substance prescription refill requests (narcotic refills) that need to be picked up at our office, we ask your cooperation by providing us with at least 72 hours (3days) notice that you will need a refill. We will not refill narcotic prescription refill requests after 4:00pm on any weekday, Monday through Thursday, or after 2:00pm on Fridays, or on the weekends. We encourage everyone to explore another way of getting your prescription refill request processed using [email or web address], our patient web portal through our electronic medical record system. [email or web address] is an efficient and effective way to communicate your medication request directly to the office and  downloadable as an momo on your smart phone . [email or web address] also features a review functionality that allows you to view your medication list as well as leave messages for your physician. Are you ready to get connected? If so please review the attatched instructions or speak to any of our staff to get you set up right away! Thank you so much for your cooperation. Should you have any questions please contact our Practice Administrator.     The Physicians and Staff,  [facility]   Current tremors appear to have a closest approximation to an essential type tremor. Has refills on the propranolol and is welcome to try it again once she feels better.

## 2017-03-29 NOTE — TELEPHONE ENCOUNTER
Pt would like to have call back from you please regarding to paperwork. Pt said you gave her paper but pt did notv know what happened to the paper. Pt did not say what kind of paper. Pt can be reach at [phone].  Thank you

## 2017-04-08 ENCOUNTER — APPOINTMENT (OUTPATIENT)
Dept: GENERAL RADIOLOGY | Age: 82
End: 2017-04-08
Attending: EMERGENCY MEDICINE
Payer: MEDICARE

## 2017-04-08 ENCOUNTER — HOSPITAL ENCOUNTER (EMERGENCY)
Age: 82
Discharge: HOME | End: 2017-04-08
Attending: EMERGENCY MEDICINE
Payer: MEDICARE

## 2017-04-08 VITALS
RESPIRATION RATE: 17 BRPM | HEIGHT: 62 IN | WEIGHT: 130 LBS | DIASTOLIC BLOOD PRESSURE: 49 MMHG | SYSTOLIC BLOOD PRESSURE: 125 MMHG | BODY MASS INDEX: 23.92 KG/M2 | HEART RATE: 75 BPM | OXYGEN SATURATION: 94 %

## 2017-04-08 DIAGNOSIS — K59.00 CONSTIPATION, UNSPECIFIED CONSTIPATION TYPE: Primary | ICD-10-CM

## 2017-04-08 LAB
ALBUMIN SERPL BCP-MCNC: 3.8 G/DL (ref 3.5–5)
ALBUMIN/GLOB SERPL: 1.1 {RATIO} (ref 1.1–2.2)
ALP SERPL-CCNC: 80 U/L (ref 45–117)
ALT SERPL-CCNC: 20 U/L (ref 12–78)
ANION GAP BLD CALC-SCNC: 14 MMOL/L (ref 5–15)
APPEARANCE UR: CLEAR
AST SERPL W P-5'-P-CCNC: 15 U/L (ref 15–37)
BACTERIA URNS QL MICRO: NEGATIVE /HPF
BASOPHILS # BLD AUTO: 0 K/UL (ref 0–0.1)
BASOPHILS # BLD: 0 % (ref 0–1)
BILIRUB SERPL-MCNC: 0.4 MG/DL (ref 0.2–1)
BILIRUB UR QL: NEGATIVE
BUN SERPL-MCNC: 22 MG/DL (ref 6–20)
BUN/CREAT SERPL: 22 (ref 12–20)
CALCIUM SERPL-MCNC: 9.8 MG/DL (ref 8.5–10.1)
CHLORIDE SERPL-SCNC: 98 MMOL/L (ref 97–108)
CO2 SERPL-SCNC: 22 MMOL/L (ref 21–32)
COLOR UR: ABNORMAL
CREAT SERPL-MCNC: 0.98 MG/DL (ref 0.55–1.02)
DIFFERENTIAL METHOD BLD: ABNORMAL
EOSINOPHIL # BLD: 0.1 K/UL (ref 0–0.4)
EOSINOPHIL NFR BLD: 1 % (ref 0–7)
EPITH CASTS URNS QL MICRO: ABNORMAL /LPF
ERYTHROCYTE [DISTWIDTH] IN BLOOD BY AUTOMATED COUNT: 13.9 % (ref 11.5–14.5)
GLOBULIN SER CALC-MCNC: 3.5 G/DL (ref 2–4)
GLUCOSE SERPL-MCNC: 174 MG/DL (ref 65–100)
GLUCOSE UR STRIP.AUTO-MCNC: NEGATIVE MG/DL
HCT VFR BLD AUTO: 43.7 % (ref 35–47)
HGB BLD-MCNC: 15.1 G/DL (ref 11.5–16)
HGB UR QL STRIP: NEGATIVE
HYALINE CASTS URNS QL MICRO: ABNORMAL /LPF (ref 0–5)
KETONES UR QL STRIP.AUTO: ABNORMAL MG/DL
LEUKOCYTE ESTERASE UR QL STRIP.AUTO: NEGATIVE
LIPASE SERPL-CCNC: 353 U/L (ref 73–393)
LYMPHOCYTES # BLD AUTO: 39 % (ref 12–49)
LYMPHOCYTES # BLD: 5.2 K/UL (ref 0.8–3.5)
MCH RBC QN AUTO: 30.4 PG (ref 26–34)
MCHC RBC AUTO-ENTMCNC: 34.6 G/DL (ref 30–36.5)
MCV RBC AUTO: 88.1 FL (ref 80–99)
MONOCYTES # BLD: 0.9 K/UL (ref 0–1)
MONOCYTES NFR BLD AUTO: 7 % (ref 5–13)
NEUTS SEG # BLD: 7.1 K/UL (ref 1.8–8)
NEUTS SEG NFR BLD AUTO: 53 % (ref 32–75)
NITRITE UR QL STRIP.AUTO: NEGATIVE
PH UR STRIP: 5.5 [PH] (ref 5–8)
PLATELET # BLD AUTO: 368 K/UL (ref 150–400)
POTASSIUM SERPL-SCNC: 3.8 MMOL/L (ref 3.5–5.1)
PROT SERPL-MCNC: 7.3 G/DL (ref 6.4–8.2)
PROT UR STRIP-MCNC: NEGATIVE MG/DL
RBC # BLD AUTO: 4.96 M/UL (ref 3.8–5.2)
RBC #/AREA URNS HPF: ABNORMAL /HPF (ref 0–5)
RBC MORPH BLD: ABNORMAL
SODIUM SERPL-SCNC: 134 MMOL/L (ref 136–145)
SP GR UR REFRACTOMETRY: 1.02 (ref 1–1.03)
UROBILINOGEN UR QL STRIP.AUTO: 0.2 EU/DL (ref 0.2–1)
WBC # BLD AUTO: 13.3 K/UL (ref 3.6–11)
WBC MORPH BLD: ABNORMAL
WBC URNS QL MICRO: ABNORMAL /HPF (ref 0–4)

## 2017-04-08 PROCEDURE — 81001 URINALYSIS AUTO W/SCOPE: CPT | Performed by: EMERGENCY MEDICINE

## 2017-04-08 PROCEDURE — 74020 XR ABD FLAT/ ERECT: CPT

## 2017-04-08 PROCEDURE — 83690 ASSAY OF LIPASE: CPT | Performed by: EMERGENCY MEDICINE

## 2017-04-08 PROCEDURE — 99284 EMERGENCY DEPT VISIT MOD MDM: CPT

## 2017-04-08 PROCEDURE — 85025 COMPLETE CBC W/AUTO DIFF WBC: CPT | Performed by: EMERGENCY MEDICINE

## 2017-04-08 PROCEDURE — 80053 COMPREHEN METABOLIC PANEL: CPT | Performed by: EMERGENCY MEDICINE

## 2017-04-08 PROCEDURE — 36415 COLL VENOUS BLD VENIPUNCTURE: CPT | Performed by: EMERGENCY MEDICINE

## 2017-04-08 PROCEDURE — 77030011943

## 2017-04-08 RX ORDER — POLYETHYLENE GLYCOL 3350 17 G/17G
17 POWDER, FOR SOLUTION ORAL DAILY
Qty: 510 G | Refills: 0 | Status: ON HOLD | OUTPATIENT
Start: 2017-04-08 | End: 2018-01-01

## 2017-04-08 RX ORDER — SODIUM CHLORIDE 0.9 % (FLUSH) 0.9 %
5-10 SYRINGE (ML) INJECTION AS NEEDED
Status: DISCONTINUED | OUTPATIENT
Start: 2017-04-08 | End: 2017-04-09 | Stop reason: HOSPADM

## 2017-04-08 RX ORDER — SODIUM CHLORIDE 0.9 % (FLUSH) 0.9 %
5-10 SYRINGE (ML) INJECTION EVERY 8 HOURS
Status: DISCONTINUED | OUTPATIENT
Start: 2017-04-08 | End: 2017-04-09 | Stop reason: HOSPADM

## 2017-04-08 NOTE — ED TRIAGE NOTES
Pt states she hasn't been taking her medicine for 2 weeks because she doesn't remember to take them. \"I always feel hungry. \" Pt very argumentative and hostile. Pt was at salon at had was incontinent of stool so the rescue squad was called.

## 2017-04-08 NOTE — ED NOTES
Patient denies having a bowel movement. Cleaned stool on leg off, placed in gown and covered with sheet. Refused to have diaper placed, preferred to have nothing on but gown and sheet.

## 2017-04-08 NOTE — ED PROVIDER NOTES
HPI Comments: 80 y.o. female with past medical history significant for diabetes, chronic pain, Parkinson's disease and dementia who presents via EMS with chief complaint of diarrhea. Per EMS, pt was at the hair salon when she complained of abdominal pain and had a loose bowel movement on herself. Upon arrival to the ED she complains of being hungry and states that her diabetes are \"acting up\". She states that she has medicine but is not taking it and claims that states \"there is no one to help me\". Pt currently denies feeling sick. There are no other acute medical concerns at this time. Full history, physical exam, and ROS unable to be obtained due to:  patient uncooperative, dementia. Social hx: former smoker, no EtOH use, no drug use  PCP: Sondra Truong DO  Review of Records: Pt was seen by Dr. Epifanio Soto for a tremor. Pt takes medication for anxiety. Hx of dementia. Note written by Roxana Alejandro, as dictated by Adams Coe MD 6:36 PM      The history is provided by the patient. The history is limited by the condition of the patient. No  was used. Past Medical History:   Diagnosis Date    Chronic pain     Peripheral neuropathy    Dementia     Diabetes ([address]     Parkinson's disease ([address]        Past Surgical History:   Procedure Laterality Date    ABDOMEN SURGERY PROC UNLISTED  0015    Umbilical Hernia repair    CARDIAC SURG PROCEDURE UNLIST  4/2014    valve replacement         Family History:   Problem Relation Age of Onset    Diabetes Mother     Parkinsonism Father     Diabetes Brother        Social History     Social History    Marital status:      Spouse name: N/A    Number of children: N/A    Years of education: N/A     Occupational History    Not on file.      Social History Main Topics    Smoking status: Former Smoker     Packs/day: 1.00     Years: 60.00     Quit date: 3/30/2013    Smokeless tobacco: Not on file    Alcohol use No    Drug use: No    Sexual activity: Not on file     Other Topics Concern    Not on file     Social History Narrative         ALLERGIES: Prednisone    Review of Systems   Unable to perform ROS: Dementia       There were no vitals filed for this visit. Physical Exam   Constitutional: She is oriented to person, place, and time. She appears well-developed and well-nourished. No distress. HENT:   Head: Normocephalic and atraumatic. Right Ear: External ear normal.   Left Ear: External ear normal.   Nose: Nose normal.   Mouth/Throat: Oropharynx is clear and moist.   Eyes: Conjunctivae and EOM are normal. Pupils are equal, round, and reactive to light. Neck: Normal range of motion. Neck supple. No JVD present. No thyromegaly present. Cardiovascular: Normal rate, regular rhythm, normal heart sounds and intact distal pulses. No murmur heard. Pulmonary/Chest: Effort normal and breath sounds normal. No respiratory distress. She has no wheezes. She has no rales. Abdominal: Soft. Bowel sounds are normal. She exhibits no distension. There is no tenderness. Musculoskeletal: Normal range of motion. She exhibits no edema. Neurological: She is alert and oriented to person, place, and time. No cranial nerve deficit. Confused at baseline in regards to medical care. Skin: Skin is warm and dry. No rash noted. Psychiatric: She has a normal mood and affect. Her behavior is normal. Thought content normal.   Nursing note and vitals reviewed. Note written by Roxana Elizabeth, as dictated by Eladio Mcelroy MD 6:36 PM    [facility]  ED Course       Procedures    PROGRESS NOTE:  7:42 PM  The Pt's labs were significant for a glucose of 174. Everything else is unremarkable. Lipase is normal. Pt's CBC resulted a mildly elevated white blood cell count of 13.3 with no shift. Pt's abd XRAY yielded non specific fecal stasis but was otherwise unremarkable.       PROGRESS NOTE:  8:41 PM  There where no signs of infection so the pt will be discharged home and told to resume normal medication schedule as well as close follow up with her PCP. Due to her XRAY showing mild fecal stasis, she will also be sent home with Miralax.

## 2017-04-08 NOTE — ED NOTES
Bedside shift change report given to [address] (oncoming nurse) by Ben Melvin (offgoing nurse). Report included the following information SBAR, Kardex, ED Summary and Recent Results.

## 2017-04-09 NOTE — DISCHARGE INSTRUCTIONS
Constipation: Care Instructions  Your Care Instructions  Constipation means that you have a hard time passing stools (bowel movements). People pass stools from 3 times a day to once every 3 days. What is normal for you may be different. Constipation may occur with pain in the rectum and cramping. The pain may get worse when you try to pass stools. Sometimes there are small amounts of bright red blood on toilet paper or the surface of stools. This is because of enlarged veins near the rectum (hemorrhoids). A few changes in your diet and lifestyle may help you avoid ongoing constipation. Your doctor may also prescribe medicine to help loosen your stool. Some medicines can cause constipation. These include pain medicines and antidepressants. Tell your doctor about all the medicines you take. Your doctor may want to make a medicine change to ease your symptoms. Follow-up care is a key part of your treatment and safety. Be sure to make and go to all appointments, and call your doctor if you are having problems. It's also a good idea to know your test results and keep a list of the medicines you take. How can you care for yourself at home? · Drink plenty of fluids, enough so that your urine is light yellow or clear like water. If you have kidney, heart, or liver disease and have to limit fluids, talk with your doctor before you increase the amount of fluids you drink. · Include high-fiber foods in your diet each day. These include fruits, vegetables, beans, and whole grains. · Get at least 30 minutes of exercise on most days of the week. Walking is a good choice. You also may want to do other activities, such as running, swimming, cycling, or playing tennis or team sports. · Take a fiber supplement, such as Citrucel or Metamucil, every day. Read and follow all instructions on the label. · Schedule time each day for a bowel movement. A daily routine may help.  Take your time having your bowel movement. · Support your feet with a small step stool when you sit on the toilet. This helps flex your hips and places your pelvis in a squatting position. · Your doctor may recommend an over-the-counter laxative to relieve your constipation. Examples are Milk of Magnesia and MiraLax. Read and follow all instructions on the label. Do not use laxatives on a long-term basis. When should you call for help? Call your doctor now or seek immediate medical care if:  · You have new or worse belly pain. · You have new or worse nausea or vomiting. · You have blood in your stools. Watch closely for changes in your health, and be sure to contact your doctor if:  · Your constipation is getting worse. · You do not get better as expected. Where can you learn more? Go to http://marina-duglas.info/. Enter [phone] in the search box to learn more about \"Constipation: Care Instructions. \"  Current as of: May 27, 2016  Content Version: 11.2  © 7139-3597 [employer]. Care instructions adapted under license by [employer] (which disclaims liability or warranty for this information). If you have questions about a medical condition or this instruction, always ask your healthcare professional. [address] any warranty or liability for your use of this information. We hope that we have addressed all of your medical concerns. The examination and treatment you received in the Emergency Department were for an emergent problem and were not intended as complete care. It is important that you follow up with your healthcare provider(s) for ongoing care. If your symptoms worsen or do not improve as expected, and you are unable to reach your usual health care provider(s), you should return to the Emergency Department. Today's healthcare is undergoing tremendous change, and patient satisfaction surveys are one of the many tools to assess the quality of medical care. You may receive a survey from the [email or web address] regarding your experience in the Emergency Department. I hope that your experience has been completely positive, particularly the medical care that I provided. As such, please participate in the survey; anything less than excellent does not meet my expectations or intentions. [facility] and [facility] participate in nationally recognized quality of care measures. If your blood pressure is greater than 120/80, as reported below, we urge that you seek medical care to address the potential of high blood pressure, commonly known as hypertension. Hypertension can be hereditary or can be caused by certain medical conditions, pain, stress, or \"white coat syndrome. \"       Please make an appointment with your health care provider(s) for follow up of your Emergency Department visit. VITALS:   Patient Vitals for the past 8 hrs:   Pulse Resp BP SpO2   04/08/17 1841 86 17 96/74 95 %          Thank you for allowing us to provide you with medical care today. We realize that you have many choices for your emergency care needs. Please choose us in the future for any continued health care needs. Regards,           Rd Zavala, [address] [place], [place] [address].   Office: [phone]            Recent Results (from the past 24 hour(s))   METABOLIC PANEL, COMPREHENSIVE    Collection Time: 04/08/17  6:55 PM   Result Value Ref Range    Sodium 134 (L) 136 - 145 mmol/L    Potassium 3.8 3.5 - 5.1 mmol/L    Chloride 98 97 - 108 mmol/L    CO2 22 21 - 32 mmol/L    Anion gap 14 5 - 15 mmol/L    Glucose 174 (H) 65 - 100 mg/dL    BUN 22 (H) 6 - 20 MG/DL    Creatinine 0.98 0.55 - 1.02 MG/DL    BUN/Creatinine ratio 22 (H) 12 - 20      GFR est AA >60 >60 ml/min/1.73m2    GFR est non-AA 54 (L) >60 ml/min/1.73m2    Calcium 9.8 8.5 - 10.1 MG/DL    Bilirubin, total 0.4 0.2 - 1.0 MG/DL    ALT (SGPT) 20 12 - 78 U/L    AST (SGOT) 15 15 - 37 U/L    Alk. phosphatase 80 45 - 117 U/L    Protein, total 7.3 6.4 - 8.2 g/dL    Albumin 3.8 3.5 - 5.0 g/dL    Globulin 3.5 2.0 - 4.0 g/dL    A-G Ratio 1.1 1.1 - 2.2     LIPASE    Collection Time: 04/08/17  6:55 PM   Result Value Ref Range    Lipase 353 73 - 393 U/L   CBC WITH AUTOMATED DIFF    Collection Time: 04/08/17  6:55 PM   Result Value Ref Range    WBC 13.3 (H) 3.6 - 11.0 K/uL    RBC 4.96 3.80 - 5.20 M/uL    HGB 15.1 11.5 - 16.0 g/dL    HCT 43.7 35.0 - 47.0 %    MCV 88.1 80.0 - 99.0 FL    MCH 30.4 26.0 - 34.0 PG    MCHC 34.6 30.0 - 36.5 g/dL    RDW 13.9 11.5 - 14.5 %    PLATELET 260 085 - 165 K/uL    NEUTROPHILS 53 32 - 75 %    LYMPHOCYTES 39 12 - 49 %    MONOCYTES 7 5 - 13 %    EOSINOPHILS 1 0 - 7 %    BASOPHILS 0 0 - 1 %    ABS. NEUTROPHILS 7.1 1.8 - 8.0 K/UL    ABS. LYMPHOCYTES 5.2 (H) 0.8 - 3.5 K/UL    ABS. MONOCYTES 0.9 0.0 - 1.0 K/UL    ABS. EOSINOPHILS 0.1 0.0 - 0.4 K/UL    ABS. BASOPHILS 0.0 0.0 - 0.1 K/UL    DF SMEAR SCANNED      RBC COMMENTS NORMOCYTIC, NORMOCHROMIC      WBC COMMENTS REACTIVE LYMPHS     URINALYSIS W/MICROSCOPIC    Collection Time: 04/08/17  8:17 PM   Result Value Ref Range    Color YELLOW/STRAW      Appearance CLEAR CLEAR      Specific gravity 1.023 1.003 - 1.030      pH (UA) 5.5 5.0 - 8.0      Protein NEGATIVE  NEG mg/dL    Glucose NEGATIVE  NEG mg/dL    Ketone TRACE (A) NEG mg/dL    Bilirubin NEGATIVE  NEG      Blood NEGATIVE  NEG      Urobilinogen 0.2 0.2 - 1.0 EU/dL    Nitrites NEGATIVE  NEG      Leukocyte Esterase NEGATIVE  NEG      WBC 0-4 0 - 4 /hpf    RBC 0-5 0 - 5 /hpf    Epithelial cells FEW FEW /lpf    Bacteria NEGATIVE  NEG /hpf    Hyaline cast 2-5 0 - 5 /lpf       Xr Abd Flat/ Erect    Result Date: 4/8/2017  Clinical indication: Abdominal pain. Supine and upright views of the abdomen obtained. There is no free air. Mild fecal stasis. Nonspecific otherwise. impression: Nonspecific fecal stasis.

## 2017-04-14 ENCOUNTER — HOSPITAL ENCOUNTER (EMERGENCY)
Age: 82
Discharge: HOME | End: 2017-04-15
Attending: EMERGENCY MEDICINE
Payer: MEDICARE

## 2017-04-14 VITALS
TEMPERATURE: 98 F | BODY MASS INDEX: 23.19 KG/M2 | SYSTOLIC BLOOD PRESSURE: 157 MMHG | OXYGEN SATURATION: 100 % | DIASTOLIC BLOOD PRESSURE: 82 MMHG | RESPIRATION RATE: 18 BRPM | HEIGHT: 62 IN | WEIGHT: 126 LBS | HEART RATE: 83 BPM

## 2017-04-14 DIAGNOSIS — N39.0 URINARY TRACT INFECTION WITHOUT HEMATURIA, SITE UNSPECIFIED: Primary | ICD-10-CM

## 2017-04-14 LAB
ALBUMIN SERPL BCP-MCNC: 3.7 G/DL (ref 3.5–5)
ALBUMIN/GLOB SERPL: 1.1 {RATIO} (ref 1.1–2.2)
ALP SERPL-CCNC: 102 U/L (ref 45–117)
ALT SERPL-CCNC: 19 U/L (ref 12–78)
ANION GAP BLD CALC-SCNC: 6 MMOL/L (ref 5–15)
APPEARANCE UR: ABNORMAL
AST SERPL W P-5'-P-CCNC: 15 U/L (ref 15–37)
BACTERIA URNS QL MICRO: NEGATIVE /HPF
BASOPHILS # BLD AUTO: 0 K/UL (ref 0–0.1)
BASOPHILS # BLD: 0 % (ref 0–1)
BILIRUB SERPL-MCNC: 0.3 MG/DL (ref 0.2–1)
BILIRUB UR QL: NEGATIVE
BUN SERPL-MCNC: 15 MG/DL (ref 6–20)
BUN/CREAT SERPL: 16 (ref 12–20)
CALCIUM SERPL-MCNC: 10 MG/DL (ref 8.5–10.1)
CHLORIDE SERPL-SCNC: 101 MMOL/L (ref 97–108)
CO2 SERPL-SCNC: 31 MMOL/L (ref 21–32)
COLOR UR: ABNORMAL
CREAT SERPL-MCNC: 0.96 MG/DL (ref 0.55–1.02)
EOSINOPHIL # BLD: 0.2 K/UL (ref 0–0.4)
EOSINOPHIL NFR BLD: 1 % (ref 0–7)
EPITH CASTS URNS QL MICRO: ABNORMAL /LPF
ERYTHROCYTE [DISTWIDTH] IN BLOOD BY AUTOMATED COUNT: 13.9 % (ref 11.5–14.5)
GLOBULIN SER CALC-MCNC: 3.5 G/DL (ref 2–4)
GLUCOSE SERPL-MCNC: 129 MG/DL (ref 65–100)
GLUCOSE UR STRIP.AUTO-MCNC: NEGATIVE MG/DL
HCT VFR BLD AUTO: 39.2 % (ref 35–47)
HGB BLD-MCNC: 13 G/DL (ref 11.5–16)
HGB UR QL STRIP: ABNORMAL
KETONES UR QL STRIP.AUTO: NEGATIVE MG/DL
LEUKOCYTE ESTERASE UR QL STRIP.AUTO: ABNORMAL
LYMPHOCYTES # BLD AUTO: 41 % (ref 12–49)
LYMPHOCYTES # BLD: 5.5 K/UL (ref 0.8–3.5)
MCH RBC QN AUTO: 29.8 PG (ref 26–34)
MCHC RBC AUTO-ENTMCNC: 33.2 G/DL (ref 30–36.5)
MCV RBC AUTO: 89.9 FL (ref 80–99)
MONOCYTES # BLD: 1 K/UL (ref 0–1)
MONOCYTES NFR BLD AUTO: 7 % (ref 5–13)
NEUTS SEG # BLD: 6.6 K/UL (ref 1.8–8)
NEUTS SEG NFR BLD AUTO: 51 % (ref 32–75)
NITRITE UR QL STRIP.AUTO: NEGATIVE
PH UR STRIP: 7.5 [PH] (ref 5–8)
PLATELET # BLD AUTO: 264 K/UL (ref 150–400)
POTASSIUM SERPL-SCNC: 4.6 MMOL/L (ref 3.5–5.1)
PROT SERPL-MCNC: 7.2 G/DL (ref 6.4–8.2)
PROT UR STRIP-MCNC: NEGATIVE MG/DL
RBC # BLD AUTO: 4.36 M/UL (ref 3.8–5.2)
RBC #/AREA URNS HPF: ABNORMAL /HPF (ref 0–5)
SODIUM SERPL-SCNC: 138 MMOL/L (ref 136–145)
SP GR UR REFRACTOMETRY: <1.005 (ref 1–1.03)
UA: UC IF INDICATED,UAUC: ABNORMAL
UROBILINOGEN UR QL STRIP.AUTO: 0.2 EU/DL (ref 0.2–1)
WBC # BLD AUTO: 13.4 K/UL (ref 3.6–11)
WBC URNS QL MICRO: ABNORMAL /HPF (ref 0–4)

## 2017-04-14 PROCEDURE — 74011000258 HC RX REV CODE- 258: Performed by: EMERGENCY MEDICINE

## 2017-04-14 PROCEDURE — 77030034850

## 2017-04-14 PROCEDURE — 74011250636 HC RX REV CODE- 250/636: Performed by: EMERGENCY MEDICINE

## 2017-04-14 PROCEDURE — 51702 INSERT TEMP BLADDER CATH: CPT

## 2017-04-14 PROCEDURE — 96375 TX/PRO/DX INJ NEW DRUG ADDON: CPT

## 2017-04-14 PROCEDURE — 96365 THER/PROPH/DIAG IV INF INIT: CPT

## 2017-04-14 PROCEDURE — 85025 COMPLETE CBC W/AUTO DIFF WBC: CPT | Performed by: EMERGENCY MEDICINE

## 2017-04-14 PROCEDURE — 80053 COMPREHEN METABOLIC PANEL: CPT | Performed by: EMERGENCY MEDICINE

## 2017-04-14 PROCEDURE — 74011250636 HC RX REV CODE- 250/636

## 2017-04-14 PROCEDURE — 36415 COLL VENOUS BLD VENIPUNCTURE: CPT | Performed by: EMERGENCY MEDICINE

## 2017-04-14 PROCEDURE — 77030011943

## 2017-04-14 PROCEDURE — 87086 URINE CULTURE/COLONY COUNT: CPT | Performed by: EMERGENCY MEDICINE

## 2017-04-14 PROCEDURE — 74011000250 HC RX REV CODE- 250

## 2017-04-14 PROCEDURE — 81001 URINALYSIS AUTO W/SCOPE: CPT | Performed by: EMERGENCY MEDICINE

## 2017-04-14 PROCEDURE — 51798 US URINE CAPACITY MEASURE: CPT

## 2017-04-14 PROCEDURE — 99284 EMERGENCY DEPT VISIT MOD MDM: CPT

## 2017-04-14 RX ORDER — MORPHINE SULFATE 4 MG/ML
2 INJECTION, SOLUTION INTRAMUSCULAR; INTRAVENOUS
Status: DISCONTINUED | OUTPATIENT
Start: 2017-04-14 | End: 2017-04-14

## 2017-04-14 RX ORDER — ONDANSETRON 2 MG/ML
INJECTION INTRAMUSCULAR; INTRAVENOUS
Status: COMPLETED
Start: 2017-04-14 | End: 2017-04-14

## 2017-04-14 RX ORDER — MORPHINE SULFATE 2 MG/ML
2 INJECTION, SOLUTION INTRAMUSCULAR; INTRAVENOUS
Status: COMPLETED | OUTPATIENT
Start: 2017-04-14 | End: 2017-04-14

## 2017-04-14 RX ORDER — CEPHALEXIN 500 MG/1
500 CAPSULE ORAL 4 TIMES DAILY
Qty: 28 CAP | Refills: 0 | Status: SHIPPED | OUTPATIENT
Start: 2017-04-14 | End: 2017-04-21

## 2017-04-14 RX ORDER — ONDANSETRON 2 MG/ML
4 INJECTION INTRAMUSCULAR; INTRAVENOUS
Status: COMPLETED | OUTPATIENT
Start: 2017-04-14 | End: 2017-04-14

## 2017-04-14 RX ORDER — LIDOCAINE HYDROCHLORIDE 20 MG/ML
JELLY TOPICAL
Status: COMPLETED
Start: 2017-04-14 | End: 2017-04-14

## 2017-04-14 RX ADMIN — ONDANSETRON 4 MG: 2 INJECTION INTRAMUSCULAR; INTRAVENOUS at 23:13

## 2017-04-14 RX ADMIN — CEFTRIAXONE SODIUM 1 G: 1 INJECTION, POWDER, FOR SOLUTION INTRAMUSCULAR; INTRAVENOUS at 23:15

## 2017-04-14 RX ADMIN — LIDOCAINE HYDROCHLORIDE: 20 JELLY TOPICAL at 23:00

## 2017-04-14 RX ADMIN — Medication 2 MG: at 23:04

## 2017-04-15 NOTE — DISCHARGE INSTRUCTIONS
Urinary Tract Infection in Women: Care Instructions  Your Care Instructions    A urinary tract infection, or UTI, is a general term for an infection anywhere between the kidneys and the urethra (where urine comes out). Most UTIs are bladder infections. They often cause pain or burning when you urinate. UTIs are caused by bacteria and can be cured with antibiotics. Be sure to complete your treatment so that the infection goes away. Follow-up care is a key part of your treatment and safety. Be sure to make and go to all appointments, and call your doctor if you are having problems. It's also a good idea to know your test results and keep a list of the medicines you take. How can you care for yourself at home? · Take your antibiotics as directed. Do not stop taking them just because you feel better. You need to take the full course of antibiotics. · Drink extra water and other fluids for the next day or two. This may help wash out the bacteria that are causing the infection. (If you have kidney, heart, or liver disease and have to limit fluids, talk with your doctor before you increase your fluid intake.)  · Avoid drinks that are carbonated or have caffeine. They can irritate the bladder. · Urinate often. Try to empty your bladder each time. · To relieve pain, take a hot bath or lay a heating pad set on low over your lower belly or genital area. Never go to sleep with a heating pad in place. To prevent UTIs  · Drink plenty of water each day. This helps you urinate often, which clears bacteria from your system. (If you have kidney, heart, or liver disease and have to limit fluids, talk with your doctor before you increase your fluid intake.)  · Urinate when you need to. · Urinate right after you have sex. · Change sanitary pads often. · Avoid douches, bubble baths, feminine hygiene sprays, and other feminine hygiene products that have deodorants.   · After going to the bathroom, wipe from front to back.  When should you call for help? Call your doctor now or seek immediate medical care if:  · Symptoms such as fever, chills, nausea, or vomiting get worse or appear for the first time. · You have new pain in your back just below your rib cage. This is called flank pain. · There is new blood or pus in your urine. · You have any problems with your antibiotic medicine. Watch closely for changes in your health, and be sure to contact your doctor if:  · You are not getting better after taking an antibiotic for 2 days. · Your symptoms go away but then come back. Where can you learn more? Go to http://marina-duglas.info/. Enter D286 in the search box to learn more about \"Urinary Tract Infection in Women: Care Instructions. \"  Current as of: November 28, 2016  Content Version: 11.2  © 2920-6794 [employer]. Care instructions adapted under license by [employer] (which disclaims liability or warranty for this information). If you have questions about a medical condition or this instruction, always ask your healthcare professional. [address] any warranty or liability for your use of this information. We hope that we have addressed all of your medical concerns. The examination and treatment you received in the Emergency Department were for an emergent problem and were not intended as complete care. It is important that you follow up with your healthcare provider(s) for ongoing care. If your symptoms worsen or do not improve as expected, and you are unable to reach your usual health care provider(s), you should return to the Emergency Department. Today's healthcare is undergoing tremendous change, and patient satisfaction surveys are one of the many tools to assess the quality of medical care. You may receive a survey from the [employer] organization regarding your experience in the Emergency Department.   I hope that your experience has been completely positive, particularly the medical care that I provided. As such, please participate in the survey; anything less than excellent does not meet my expectations or intentions. [facility] and [facility] participate in nationally recognized quality of care measures. If your blood pressure is greater than 120/80, as reported below, we urge that you seek medical care to address the potential of high blood pressure, commonly known as hypertension. Hypertension can be hereditary or can be caused by certain medical conditions, pain, stress, or \"white coat syndrome. \"       Please make an appointment with your health care provider(s) for follow up of your Emergency Department visit. VITALS:   Patient Vitals for the past 8 hrs:   Temp Pulse Resp BP SpO2   04/14/17 2112 98 °F (36.7 °C) 83 18 157/82 100 %          Thank you for allowing us to provide you with medical care today. We realize that you have many choices for your emergency care needs. Please choose us in the future for any continued health care needs. Claudene Feather  [place] [address].   Office: [phone]            Recent Results (from the past 24 hour(s))   URINALYSIS W/ REFLEX CULTURE    Collection Time: 04/14/17  9:39 PM   Result Value Ref Range    Color YELLOW/STRAW      Appearance CLOUDY (A) CLEAR      Specific gravity <1.005 1.003 - 1.030    pH (UA) 7.5 5.0 - 8.0      Protein NEGATIVE  NEG mg/dL    Glucose NEGATIVE  NEG mg/dL    Ketone NEGATIVE  NEG mg/dL    Bilirubin NEGATIVE  NEG      Blood LARGE (A) NEG      Urobilinogen 0.2 0.2 - 1.0 EU/dL    Nitrites NEGATIVE  NEG      Leukocyte Esterase LARGE (A) NEG      WBC 5-10 0 - 4 /hpf    RBC 0-5 0 - 5 /hpf    Epithelial cells FEW FEW /lpf    Bacteria NEGATIVE  NEG /hpf    UA:UC IF INDICATED URINE CULTURE ORDERED (A) CNI     CBC WITH AUTOMATED DIFF    Collection Time: 04/14/17 10:56 PM   Result Value Ref Range    WBC 13.4 (H) 3.6 - 11.0 K/uL    RBC 4.36 3.80 - 5.20 M/uL    HGB 13.0 11.5 - 16.0 g/dL    HCT 39.2 35.0 - 47.0 %    MCV 89.9 80.0 - 99.0 FL    MCH 29.8 26.0 - 34.0 PG    MCHC 33.2 30.0 - 36.5 g/dL    RDW 13.9 11.5 - 14.5 %    PLATELET 815 401 - 053 K/uL    NEUTROPHILS 51 32 - 75 %    LYMPHOCYTES 41 12 - 49 %    MONOCYTES 7 5 - 13 %    EOSINOPHILS 1 0 - 7 %    BASOPHILS 0 0 - 1 %    ABS. NEUTROPHILS 6.6 1.8 - 8.0 K/UL    ABS. LYMPHOCYTES 5.5 (H) 0.8 - 3.5 K/UL    ABS. MONOCYTES 1.0 0.0 - 1.0 K/UL    ABS. EOSINOPHILS 0.2 0.0 - 0.4 K/UL    ABS. BASOPHILS 0.0 0.0 - 0.1 K/UL   METABOLIC PANEL, COMPREHENSIVE    Collection Time: 04/14/17 10:56 PM   Result Value Ref Range    Sodium 138 136 - 145 mmol/L    Potassium 4.6 3.5 - 5.1 mmol/L    Chloride 101 97 - 108 mmol/L    CO2 31 21 - 32 mmol/L    Anion gap 6 5 - 15 mmol/L    Glucose 129 (H) 65 - 100 mg/dL    BUN 15 6 - 20 MG/DL    Creatinine 0.96 0.55 - 1.02 MG/DL    BUN/Creatinine ratio 16 12 - 20      GFR est AA >60 >60 ml/min/1.73m2    GFR est non-AA 55 (L) >60 ml/min/1.73m2    Calcium 10.0 8.5 - 10.1 MG/DL    Bilirubin, total 0.3 0.2 - 1.0 MG/DL    ALT (SGPT) 19 12 - 78 U/L    AST (SGOT) 15 15 - 37 U/L    Alk. phosphatase 102 45 - 117 U/L    Protein, total 7.2 6.4 - 8.2 g/dL    Albumin 3.7 3.5 - 5.0 g/dL    Globulin 3.5 2.0 - 4.0 g/dL    A-G Ratio 1.1 1.1 - 2.2         No results found.

## 2017-04-15 NOTE — ED NOTES
Attempted IV in Left AC, pt stated, \" You can't put that damn thing on so tight, it hurts my arm, I have thin skin and I have no veins. You can't just come in here and put that on my arm and squeeze my arm so tight. \" Pt twisted left arm and IV blew. Called Inga Thorne RN to attempt for IV. Pt becoming very agitated and frustrated.

## 2017-04-15 NOTE — ED PROVIDER NOTES
HPI Comments: 80 y.o. female with past medical history significant for dementia and diabetes who presents from home via EMS with chief complaint of difficulty urinating. Pt states that for over a week she has been experiencing difficulty urinating with associated dysuria, \"pressure\" in her abdomen and abdominal distension. Pt claims to have been seen in the ED 6 days ago, where it was reportedly determined that she did not have a UTI and she was discharged home with an Rx for Polyethylene glycol. Pt states that she followed up with her PCP 2 days ago who instructed her to stop taking this medication and gave her another medication. Pt reports that she recently had a urinary catheter placed a month ago for a UTI, and afterwards has not \"felt right\". Pt denies nausea, vomiting or diarrhea. There are no other acute medical concerns at this time. PCP: Michael Garay,     Full history, physical exam, and ROS unable to be obtained due to:  dementia. Note written by Roxana Renae, as dictated by Denisha Mello. Brittani Goldsmith MD 10:37 PM      The history is provided by the patient. No  was used. Past Medical History:   Diagnosis Date    Chronic pain     Peripheral neuropathy    Dementia     Diabetes ([address]     Parkinson's disease ([address]        Past Surgical History:   Procedure Laterality Date    ABDOMEN SURGERY PROC UNLISTED  1382    Umbilical Hernia repair    CARDIAC SURG PROCEDURE UNLIST  4/2014    valve replacement         Family History:   Problem Relation Age of Onset    Diabetes Mother     Parkinsonism Father     Diabetes Brother        Social History     Social History    Marital status:      Spouse name: N/A    Number of children: N/A    Years of education: N/A     Occupational History    Not on file.      Social History Main Topics    Smoking status: Former Smoker     Packs/day: 1.00     Years: 60.00     Quit date: 3/30/2013    Smokeless tobacco: Not on file  Alcohol use No    Drug use: No    Sexual activity: Not on file     Other Topics Concern    Not on file     Social History Narrative         ALLERGIES: Prednisone    Review of Systems   Unable to perform ROS: Dementia       Vitals:    04/14/17 2112   BP: 157/82   Pulse: 83   Resp: 18   Temp: 98 °F (36.7 °C)   SpO2: 100%   Weight: 57.2 kg (126 lb)   Height: 5' 2\" (1.575 m)            Physical Exam   Constitutional: She appears well-developed and well-nourished. HENT:   Head: Normocephalic and atraumatic. Mouth/Throat: Oropharynx is clear and moist.   Eyes: Conjunctivae and EOM are normal. Pupils are equal, round, and reactive to light. No scleral icterus. Neck: Neck supple. No tracheal deviation present. Cardiovascular: Normal rate, regular rhythm and normal heart sounds. Pulmonary/Chest: Effort normal and breath sounds normal. No respiratory distress. Abdominal: Soft. She exhibits no distension. There is no tenderness. There is no rebound and no guarding. Genitourinary:   Genitourinary Comments: deferred   Musculoskeletal: She exhibits no edema. Neurological: She is alert. Skin: Skin is warm and dry. Psychiatric: She has a normal mood and affect. Nursing note and vitals reviewed. Note written by Roxana Iverson, as dictated by Leighann Sauer. Beena Pritchett MD 10:17 PM      MDM  Number of Diagnoses or Management Options  Urinary tract infection without hematuria, site unspecified:   Diagnosis management comments: 80year old female with history of dementia, DM presents with urinary retention with differential diagnosis of UTI, obstruction.   UA shows large LE, 5-10 WBC  Given 1 g of Rocephin  Refused jensen  Able to void after ED observation    A/P: UTI- Keflex, f/u with PCP, Return to the emergency department for new/ worsening symptoms or other concerns       Patient Progress  Patient progress: improved    ED Course       Procedures

## 2017-04-15 NOTE — ED TRIAGE NOTES
Patient was here last Sat. For urinary symptoms, and is here tonight for same. States her bladder is full, and she cannot completely empty it, and she had a straight cath last week, and has not felt right since.

## 2017-04-16 LAB
BACTERIA SPEC CULT: NORMAL
CC UR VC: NORMAL
SERVICE CMNT-IMP: NORMAL

## 2017-09-27 ENCOUNTER — HOSPITAL ENCOUNTER (OUTPATIENT)
Dept: GENERAL RADIOLOGY | Age: 82
Discharge: HOME | End: 2017-09-27
Attending: SPECIALIST
Payer: MEDICARE

## 2017-09-27 DIAGNOSIS — R13.10 DYSPHAGIA: ICD-10-CM

## 2017-09-27 PROCEDURE — 74220 X-RAY XM ESOPHAGUS 1CNTRST: CPT

## 2017-12-18 NOTE — DISCHARGE INSTRUCTIONS
We hope that we have addressed all of your medical concerns. The examination and treatment you received in the Emergency Department were for an emergent problem and were not intended as complete care. It is important that you follow up with your healthcare provider(s) for ongoing care. If your symptoms worsen or do not improve as expected, and you are unable to reach your usual health care provider(s), you should return to the Emergency Department. Today's healthcare is undergoing tremendous change, and patient satisfaction surveys are one of the many tools to assess the quality of medical care. You may receive a survey from the [employer] organization regarding your experience in the Emergency Department. I hope that your experience has been completely positive, particularly the medical care that I provided. As such, please participate in the survey; anything less than excellent does not meet my expectations or intentions. Replaced by [facility] and 8 [facility] participate in nationally recognized quality of care measures. If your blood pressure is greater than 120/80, as reported below, we urge that you seek medical care to address the potential of high blood pressure, commonly known as hypertension. Hypertension can be hereditary or can be caused by certain medical conditions, pain, stress, or \"white coat syndrome. \"       Please make an appointment with your health care provider(s) for follow up of your Emergency Department visit. VITALS:   Patient Vitals for the past 8 hrs:   Temp Pulse Resp BP SpO2   12/17/17 1909 97.8 °F (36.6 °C) 72 18 (!) 219/164 95 %          Thank you for allowing us to provide you with medical care today. We realize that you have many choices for your emergency care needs. Please choose us in the future for any continued health care needs. Andrew Escobar, [address] Office: [phone]            Recent Results (from the past 24 hour(s))   EKG, 12 LEAD, INITIAL    Collection Time: 12/17/17  7:34 PM   Result Value Ref Range    Ventricular Rate 69 BPM    Atrial Rate 69 BPM    P-R Interval 148 ms    QRS Duration 82 ms    Q-T Interval 428 ms    QTC Calculation (Bezet) 458 ms    Calculated P Axis 65 degrees    Calculated R Axis 66 degrees    Calculated T Axis 72 degrees    Diagnosis       Normal sinus rhythm  Nonspecific ST abnormality  Abnormal ECG  When compared with ECG of 24-FEB-2017 16:00,  premature ventricular complexes are no longer present  QT has shortened     CBC WITH AUTOMATED DIFF    Collection Time: 12/17/17  7:55 PM   Result Value Ref Range    WBC 11.6 (H) 3.6 - 11.0 K/uL    RBC 4.68 3.80 - 5.20 M/uL    HGB 14.1 11.5 - 16.0 g/dL    HCT 41.7 35.0 - 47.0 %    MCV 89.1 80.0 - 99.0 FL    MCH 30.1 26.0 - 34.0 PG    MCHC 33.8 30.0 - 36.5 g/dL    RDW 13.1 11.5 - 14.5 %    PLATELET 288 735 - 892 K/uL    NEUTROPHILS 44 32 - 75 %    LYMPHOCYTES 47 12 - 49 %    MONOCYTES 7 5 - 13 %    EOSINOPHILS 2 0 - 7 %    BASOPHILS 0 0 - 1 %    ABS. NEUTROPHILS 5.2 1.8 - 8.0 K/UL    ABS. LYMPHOCYTES 5.4 (H) 0.8 - 3.5 K/UL    ABS. MONOCYTES 0.8 0.0 - 1.0 K/UL    ABS. EOSINOPHILS 0.2 0.0 - 0.4 K/UL    ABS. BASOPHILS 0.0 0.0 - 0.1 K/UL   METABOLIC PANEL, COMPREHENSIVE    Collection Time: 12/17/17  7:55 PM   Result Value Ref Range    Sodium 133 (L) 136 - 145 mmol/L    Potassium 4.5 3.5 - 5.1 mmol/L    Chloride 101 97 - 108 mmol/L    CO2 26 21 - 32 mmol/L    Anion gap 6 5 - 15 mmol/L    Glucose 139 (H) 65 - 100 mg/dL    BUN 18 6 - 20 MG/DL    Creatinine 0.94 0.55 - 1.02 MG/DL    BUN/Creatinine ratio 19 12 - 20      GFR est AA >60 >60 ml/min/1.73m2    GFR est non-AA 57 (L) >60 ml/min/1.73m2    Calcium 9.7 8.5 - 10.1 MG/DL    Bilirubin, total 0.2 0.2 - 1.0 MG/DL    ALT (SGPT) 20 12 - 78 U/L    AST (SGOT) 14 (L) 15 - 37 U/L    Alk.  phosphatase 102 45 - 117 U/L    Protein, total 7.5 6.4 - 8.2 g/dL Albumin 3.8 3.5 - 5.0 g/dL    Globulin 3.7 2.0 - 4.0 g/dL    A-G Ratio 1.0 (L) 1.1 - 2.2     PROTHROMBIN TIME + INR    Collection Time: 12/17/17  7:55 PM   Result Value Ref Range    INR 1.0 0.9 - 1.1      Prothrombin time 10.0 9.0 - 11.1 sec   PTT    Collection Time: 12/17/17  7:55 PM   Result Value Ref Range    aPTT 26.1 22.1 - 32.5 sec    aPTT, therapeutic range     58.0 - 77.0 SECS   NT-PRO BNP    Collection Time: 12/17/17  7:55 PM   Result Value Ref Range    NT pro- 0 - 450 PG/ML   TROPONIN I    Collection Time: 12/17/17  7:55 PM   Result Value Ref Range    Troponin-I, Qt. <0.04 <0.05 ng/mL   MAGNESIUM    Collection Time: 12/17/17  7:55 PM   Result Value Ref Range    Magnesium 1.9 1.6 - 2.4 mg/dL       Xr Chest Port    Result Date: 12/17/2017  INDICATION:  sob Exam: Portable chest 1948. Comparison: September 27, 2017. Findings: Cardiomediastinal silhouette is within normal limits. Pulmonary vasculature is not engorged. There are no focal parenchymal opacities, effusions, or pneumothorax. Patient is status post median sternotomy. Impression: 1. No acute disease              Shortness of Breath: Care Instructions  Your Care Instructions  Shortness of breath has many causes. Sometimes conditions such as anxiety can lead to shortness of breath. Some people get mild shortness of breath when they exercise. Trouble breathing also can be a symptom of a serious problem, such as asthma, lung disease, emphysema, heart problems, and pneumonia. If your shortness of breath continues, you may need tests and treatment. Watch for any changes in your breathing and other symptoms. Follow-up care is a key part of your treatment and safety. Be sure to make and go to all appointments, and call your doctor if you are having problems. It's also a good idea to know your test results and keep a list of the medicines you take. How can you care for yourself at home? · Do not smoke or allow others to smoke around you.  If you need help quitting, talk to your doctor about stop-smoking programs and medicines. These can increase your chances of quitting for good. · Get plenty of rest and sleep. · Take your medicines exactly as prescribed. Call your doctor if you think you are having a problem with your medicine. · Find healthy ways to deal with stress. ¨ Exercise daily. ¨ Get plenty of sleep. ¨ Eat regularly and well. When should you call for help? Call 911 anytime you think you may need emergency care. For example, call if:  ? · You have severe shortness of breath. ? · You have symptoms of a heart attack. These may include:  ¨ Chest pain or pressure, or a strange feeling in the chest.  ¨ Sweating. ¨ Shortness of breath. ¨ Nausea or vomiting. ¨ Pain, pressure, or a strange feeling in the back, neck, jaw, or upper belly or in one or both shoulders or arms. ¨ Lightheadedness or sudden weakness. ¨ A fast or irregular heartbeat. After you call 911, the  may tell you to chew 1 adult-strength or 2 to 4 low-dose aspirin. Wait for an ambulance. Do not try to drive yourself. ?Call your doctor now or seek immediate medical care if:  ? · Your shortness of breath gets worse or you start to wheeze. Wheezing is a high-pitched sound when you breathe. ? · You wake up at night out of breath or have to prop your head up on several pillows to breathe. ? · You are short of breath after only light activity or while at rest.   ? Watch closely for changes in your health, and be sure to contact your doctor if:  ? · You do not get better over the next 1 to 2 days. Where can you learn more? Go to http://marina-duglas.info/. Enter S780 in the search box to learn more about \"Shortness of Breath: Care Instructions. \"  Current as of: May 12, 2017  Content Version: 11.4  © 4975-9395 [email or web address].  Care instructions adapted under license by [employer] (which disclaims liability or warranty for this information). If you have questions about a medical condition or this instruction, always ask your healthcare professional. [place] [address] any warranty or liability for your use of this information.

## 2017-12-18 NOTE — ED NOTES
Dr. Tonya Diane has reviewed discharge instructions with patient and self including prescription(s) if applicable. Patient has received and verbalizes understanding of all instructions and has no further questions at this time.

## 2017-12-18 NOTE — ED PROVIDER NOTES
HPI Comments: 80 y.o. female with past medical history significant for DM, peripheral neuropathy, Parkinson's disease, dementia, s/p porcine AVR, who presents via EMS with chief complaint of intermittent SOB for several days, worsening PTA. Pt is a resident of [facility] assisted living. EMS was called by the staff at the home because patient had acute onset of SOB tonight. When EMS arrived at the scene, pt's SpO2 was WNL, but her BP was elevated. BG was 232 mg/dL, and she was noted to be agitated en route by EMS. Pt states that her SOB has actually been occurring intermittently over the past few days, and is typically worse at night when lying in bed. She states that her dyspnea feels similar to when she required valve replacement surgery in 2014. Pt specifically denies any leg swelling, CP, and abdominal pain. Noted to be afebrile in triage. There are no other acute medical concerns at this time. Social hx: - Tobacco (former smoker), - EtOH, - Illicit Drug Abuse    PCP: Jose Luis Pandey DO  CT Surgery: Jacqui De Guzman MD    Patient is alert and oriented, however she has some tangential thinking and is not the best historian. Note written by Nuria Gomez. Magdiel Thomas, as dictated by Laila Shah, DO 7:13 PM     The history is provided by the patient and the EMS personnel. The history is limited by the condition of the patient. No  was used.         Past Medical History:   Diagnosis Date    Chronic pain     Peripheral neuropathy    Dementia     Diabetes ([facility]     Parkinson's disease ([facility]        Past Surgical History:   Procedure Laterality Date    ABDOMEN SURGERY PROC UNLISTED  8281    Umbilical Hernia repair    CARDIAC SURG PROCEDURE UNLIST  4/2014    valve replacement         Family History:   Problem Relation Age of Onset    Diabetes Mother     Parkinsonism Father     Diabetes Brother        Social History     Social History    Marital status:      Spouse name: N/A    Number of children: N/A    Years of education: N/A     Occupational History    Not on file. Social History Main Topics    Smoking status: Former Smoker     Packs/day: 1.00     Years: 60.00     Quit date: 3/30/2013    Smokeless tobacco: Not on file    Alcohol use No    Drug use: No    Sexual activity: Not on file     Other Topics Concern    Not on file     Social History Narrative     ALLERGIES: Prednisone    Review of Systems   Constitutional: Negative for appetite change, chills, fever and unexpected weight change. HENT: Negative for ear pain, hearing loss, rhinorrhea and trouble swallowing. Eyes: Negative for pain and visual disturbance. Respiratory: Positive for shortness of breath. Negative for cough and chest tightness. Cardiovascular: Negative for chest pain, palpitations and leg swelling. Gastrointestinal: Negative for abdominal distention, abdominal pain, blood in stool and vomiting. Genitourinary: Negative for dysuria, hematuria and urgency. Musculoskeletal: Negative for back pain and myalgias. Skin: Negative for rash. Neurological: Negative for dizziness, syncope, weakness and numbness. Psychiatric/Behavioral: Negative for confusion and suicidal ideas. All other systems reviewed and are negative. Vitals:    12/17/17 1909   BP: (!) 219/164   Pulse: 72   Resp: 18   Temp: 97.8 °F (36.6 °C)   SpO2: 95%   Weight: 59 kg (130 lb)   Height: 5' 5\" (1.651 m)            Physical Exam   Constitutional: She is oriented to person, place, and time. She appears well-developed and well-nourished. No distress. HENT:   Head: Normocephalic and atraumatic. Right Ear: External ear normal.   Left Ear: External ear normal.   Nose: Nose normal.   Mouth/Throat: Oropharynx is clear and moist. No oropharyngeal exudate. Eyes: Conjunctivae and EOM are normal. Pupils are equal, round, and reactive to light. Right eye exhibits no discharge.  Left eye exhibits no discharge. No scleral icterus. Neck: Normal range of motion. Neck supple. JVD (mild) present. No tracheal deviation present. Cardiovascular: Normal rate, regular rhythm, normal heart sounds and intact distal pulses. Exam reveals no gallop and no friction rub. No murmur heard. Pulmonary/Chest: Effort normal. No stridor. Tachypnea noted. No respiratory distress. She has decreased breath sounds. She has no wheezes. She has no rhonchi. She has rales (bilateral bases). She exhibits no tenderness. Abdominal: Soft. Bowel sounds are normal. She exhibits no distension. There is no tenderness. There is no rebound and no guarding. Musculoskeletal: Normal range of motion. She exhibits edema (trace, BLE). She exhibits no tenderness. Neurological: She is alert and oriented to person, place, and time. She has normal strength and normal reflexes. No cranial nerve deficit or sensory deficit. She exhibits normal muscle tone. Coordination normal. GCS eye subscore is 4. GCS verbal subscore is 5. GCS motor subscore is 6. Skin: Skin is warm and dry. No rash noted. She is not diaphoretic. No erythema. No pallor. Psychiatric: She has a normal mood and affect. Her behavior is normal. Judgment and thought content normal.   Nursing note and vitals reviewed. Note written by Faizan Jacobs. Luís Foreman, as dictated by Kerry Orozco, DO 7:13 PM        MDM  Number of Diagnoses or Management Options  SOB (shortness of breath):      Amount and/or Complexity of Data Reviewed  Clinical lab tests: ordered and reviewed  Tests in the radiology section of CPT®: ordered and reviewed  Tests in the medicine section of CPT®: reviewed and ordered  Independent visualization of images, tracings, or specimens: yes (ekg)    Risk of Complications, Morbidity, and/or Mortality  Presenting problems: moderate  Diagnostic procedures: low  Management options: moderate    Patient Progress  Patient progress: stable    ED Course Procedures      PROGRESS NOTE:  8:42 PM   CXR clear, labs unremarkable. Will discharge back to living facility with PCP follow-up. Chief Complaint   Patient presents with    Shortness of Breath       The patients presenting problems have been discussed, and they are in agreement with the care plan formulated and outlined with them. I have encouraged them to ask questions as they arise throughout their visit. MEDICATIONS GIVEN:  Medications   LORazepam (ATIVAN) injection 1 mg (1 mg IntraVENous Given 12/17/17 2003)       LABS REVIEWED:  Recent Results (from the past 24 hour(s))   EKG, 12 LEAD, INITIAL    Collection Time: 12/17/17  7:34 PM   Result Value Ref Range    Ventricular Rate 69 BPM    Atrial Rate 69 BPM    P-R Interval 148 ms    QRS Duration 82 ms    Q-T Interval 428 ms    QTC Calculation (Bezet) 458 ms    Calculated P Axis 65 degrees    Calculated R Axis 66 degrees    Calculated T Axis 72 degrees    Diagnosis       Normal sinus rhythm  Nonspecific ST abnormality  Abnormal ECG  When compared with ECG of 24-FEB-2017 16:00,  premature ventricular complexes are no longer present  QT has shortened     CBC WITH AUTOMATED DIFF    Collection Time: 12/17/17  7:55 PM   Result Value Ref Range    WBC 11.6 (H) 3.6 - 11.0 K/uL    RBC 4.68 3.80 - 5.20 M/uL    HGB 14.1 11.5 - 16.0 g/dL    HCT 41.7 35.0 - 47.0 %    MCV 89.1 80.0 - 99.0 FL    MCH 30.1 26.0 - 34.0 PG    MCHC 33.8 30.0 - 36.5 g/dL    RDW 13.1 11.5 - 14.5 %    PLATELET 420 069 - 663 K/uL    NEUTROPHILS 44 32 - 75 %    LYMPHOCYTES 47 12 - 49 %    MONOCYTES 7 5 - 13 %    EOSINOPHILS 2 0 - 7 %    BASOPHILS 0 0 - 1 %    ABS. NEUTROPHILS 5.2 1.8 - 8.0 K/UL    ABS. LYMPHOCYTES 5.4 (H) 0.8 - 3.5 K/UL    ABS. MONOCYTES 0.8 0.0 - 1.0 K/UL    ABS. EOSINOPHILS 0.2 0.0 - 0.4 K/UL    ABS.  BASOPHILS 0.0 0.0 - 0.1 K/UL   METABOLIC PANEL, COMPREHENSIVE    Collection Time: 12/17/17  7:55 PM   Result Value Ref Range    Sodium 133 (L) 136 - 145 mmol/L    Potassium 4.5 3.5 - 5.1 mmol/L    Chloride 101 97 - 108 mmol/L    CO2 26 21 - 32 mmol/L    Anion gap 6 5 - 15 mmol/L    Glucose 139 (H) 65 - 100 mg/dL    BUN 18 6 - 20 MG/DL    Creatinine 0.94 0.55 - 1.02 MG/DL    BUN/Creatinine ratio 19 12 - 20      GFR est AA >60 >60 ml/min/1.73m2    GFR est non-AA 57 (L) >60 ml/min/1.73m2    Calcium 9.7 8.5 - 10.1 MG/DL    Bilirubin, total 0.2 0.2 - 1.0 MG/DL    ALT (SGPT) 20 12 - 78 U/L    AST (SGOT) 14 (L) 15 - 37 U/L    Alk. phosphatase 102 45 - 117 U/L    Protein, total 7.5 6.4 - 8.2 g/dL    Albumin 3.8 3.5 - 5.0 g/dL    Globulin 3.7 2.0 - 4.0 g/dL    A-G Ratio 1.0 (L) 1.1 - 2.2     PROTHROMBIN TIME + INR    Collection Time: 12/17/17  7:55 PM   Result Value Ref Range    INR 1.0 0.9 - 1.1      Prothrombin time 10.0 9.0 - 11.1 sec   PTT    Collection Time: 12/17/17  7:55 PM   Result Value Ref Range    aPTT 26.1 22.1 - 32.5 sec    aPTT, therapeutic range     58.0 - 77.0 SECS   NT-PRO BNP    Collection Time: 12/17/17  7:55 PM   Result Value Ref Range    NT pro- 0 - 450 PG/ML   TROPONIN I    Collection Time: 12/17/17  7:55 PM   Result Value Ref Range    Troponin-I, Qt. <0.04 <0.05 ng/mL   MAGNESIUM    Collection Time: 12/17/17  7:55 PM   Result Value Ref Range    Magnesium 1.9 1.6 - 2.4 mg/dL       VITAL SIGNS:  Patient Vitals for the past 12 hrs:   Temp Pulse Resp BP SpO2   12/17/17 1909 97.8 °F (36.6 °C) 72 18 (!) 219/164 95 %       RADIOLOGY RESULTS:  The following have been ordered and reviewed:  XR CHEST PORT   Final Result        Study Result      INDICATION:  sob      Exam: Portable chest 1948.      Comparison: September 27, 2017.     Findings: Cardiomediastinal silhouette is within normal limits. Pulmonary  vasculature is not engorged. There are no focal parenchymal opacities,  effusions, or pneumothorax. Patient is status post median sternotomy.     IMPRESSION  Impression:  1. No acute disease     ED EKG interpretation:  Rhythm: normal sinus rhythm; and regular .  Rate (approx.): 69; Axis: normal; P wave: normal; QRS interval: normal ; ST/T wave: non-specific changes; Other findings: abnormal ekg. This EKG was interpreted by India Barnett DO,ED Provider. PROGRESS NOTES:  Pt in NAD, CXR negative, labs unremarkable. Discussed results and plan with patient. Patient will be discharged home with PCP followup. Patient instructed to return to the emergency room for any worsening symptoms or any other concerns. DIAGNOSIS:    1. SOB (shortness of breath)        PLAN:  Follow-up Information     Follow up With Details Comments Contact Info    Amelie Ness DO Schedule an appointment as soon as possible for a visit  [address] [facility]  [phone]      [facility] EMERGENCY DEPT  If symptoms worsen [address] [facility]  [phone]        Discharge Medication List as of 12/17/2017  8:41 PM      CONTINUE these medications which have NOT CHANGED    Details   polyethylene glycol (MIRALAX) 17 gram/dose powder Take 17 g by mouth daily. 1 tablespoon with 8 oz of water daily, Print, Disp-510 g, R-0      propranolol (INDERAL) 20 mg tablet Take 1 Tab by mouth three (3) times daily. , Normal, Disp-90 Tab, R-6      gabapentin (NEURONTIN) 600 mg tablet Take one tablet by mouth three times a day, Historical Med      mupirocin (BACTROBAN) 2 % ointment Apply  to affected area three (3) times daily. Apply to area for 10 days, Print, Disp-22 g, R-0      clotrimazole-betamethasone (LOTRISONE) topical cream Apply  to affected area two (2) times a day.  Apply to affected area, Print, Disp-1 Tube, R-0      glipiZIDE-metFORMIN (METAGLIP) 2.5-500 mg per tablet Take 1 Tab by mouth Before breakfast and dinner., Historical Med      nortriptyline (PAMELOR) 10 mg capsule Take 40 mg by mouth nightly., Historical Med      amiodarone (CORDARONE) 200 mg tablet Take  by mouth., Historical Med      temazepam (RESTORIL) 30 mg capsule Take  by mouth nightly as needed for Sleep., Historical Med      ergocalciferol (ERGOCALCIFEROL) 50,000 unit capsule Take 50,000 Units by mouth., Historical Med      Flaxseed Oil oil by Does Not Apply route., Historical Med      omega-3 fatty acids-vitamin e (FISH OIL) 1,000 mg cap Take 1 Cap by mouth., Historical Med      GLUCOSAMINE/CHONDRO QUEZADA A (COSAMIN DS PO) Take  by mouth., Historical Med      GINKGO BILOBA (GINKOBA PO) Take  by mouth., Historical Med      ACETAMINOPHEN (TYLENOL EXTRA STRENGTH PO) Take  by mouth., Historical Med      mometasone-formoterol (DULERA) 200-5 mcg/actuation HFA inhaler Take 2 Puffs by inhalation two (2) times a day., Historical Med      albuterol (PROVENTIL VENTOLIN) 2.5 mg /3 mL (0.083 %) nebulizer solution 2.5 mg by Nebulization route four (4) times daily. , Historical Med      warfarin (COUMADIN) 2 mg tablet Take 2 mg by mouth every evening., Historical Med      VIT A/VIT C/VIT E/ZINC/COPPER (OCUVITE PRESERVISION PO) Take 1 Cap by mouth daily. , Historical Med      omeprazole (PRILOSEC) 20 mg capsule Take 20 mg by mouth two (2) times a day., Historical Med      SIMVASTATIN PO Take  by mouth daily. , Historical Med      gabapentin (NEURONTIN) 400 mg capsule Take 1,600 mg by mouth nightly., Historical Med               ED COURSE: The patients hospital course has been uncomplicated.

## 2017-12-18 NOTE — ED TRIAGE NOTES
Sob x 20 minutes at assisting living. Patient very agitated and aggressive. Patient does not recall the year and location. Patient knows her name.

## 2018-01-01 ENCOUNTER — APPOINTMENT (OUTPATIENT)
Dept: CT IMAGING | Age: 83
DRG: 689 | End: 2018-01-01
Attending: EMERGENCY MEDICINE
Payer: MEDICARE

## 2018-01-01 ENCOUNTER — APPOINTMENT (OUTPATIENT)
Dept: GENERAL RADIOLOGY | Age: 83
DRG: 689 | End: 2018-01-01
Attending: EMERGENCY MEDICINE
Payer: MEDICARE

## 2018-01-01 ENCOUNTER — APPOINTMENT (OUTPATIENT)
Dept: GENERAL RADIOLOGY | Age: 83
DRG: 689 | End: 2018-01-01
Attending: NURSE PRACTITIONER
Payer: MEDICARE

## 2018-01-01 ENCOUNTER — APPOINTMENT (OUTPATIENT)
Dept: GENERAL RADIOLOGY | Age: 83
DRG: 202 | End: 2018-01-01
Attending: EMERGENCY MEDICINE
Payer: MEDICARE

## 2018-01-01 ENCOUNTER — HOSPITAL ENCOUNTER (INPATIENT)
Age: 83
LOS: 1 days | Discharge: NURSING, REHAB OR LONG-TERM CARE | DRG: 202 | End: 2018-06-23
Attending: EMERGENCY MEDICINE | Admitting: INTERNAL MEDICINE
Payer: MEDICARE

## 2018-01-01 ENCOUNTER — TELEPHONE (OUTPATIENT)
Dept: CASE MANAGEMENT | Age: 83
End: 2018-01-01

## 2018-01-01 ENCOUNTER — HOSPITAL ENCOUNTER (INPATIENT)
Age: 83
LOS: 19 days | Discharge: HOME HEALTH | DRG: 689 | End: 2018-09-25
Attending: EMERGENCY MEDICINE | Admitting: FAMILY MEDICINE
Payer: MEDICARE

## 2018-01-01 VITALS
BODY MASS INDEX: 20.28 KG/M2 | WEIGHT: 110.23 LBS | RESPIRATION RATE: 18 BRPM | SYSTOLIC BLOOD PRESSURE: 139 MMHG | HEIGHT: 62 IN | HEART RATE: 80 BPM | DIASTOLIC BLOOD PRESSURE: 54 MMHG | TEMPERATURE: 97.3 F | OXYGEN SATURATION: 95 %

## 2018-01-01 VITALS
SYSTOLIC BLOOD PRESSURE: 161 MMHG | OXYGEN SATURATION: 94 % | HEIGHT: 65 IN | WEIGHT: 130 LBS | TEMPERATURE: 97.9 F | DIASTOLIC BLOOD PRESSURE: 80 MMHG | HEART RATE: 86 BPM | RESPIRATION RATE: 18 BRPM | BODY MASS INDEX: 21.66 KG/M2

## 2018-01-01 DIAGNOSIS — R41.0 DELIRIUM: ICD-10-CM

## 2018-01-01 DIAGNOSIS — R09.02 HYPOXIA: ICD-10-CM

## 2018-01-01 DIAGNOSIS — J20.9 ACUTE BRONCHITIS, UNSPECIFIED ORGANISM: Primary | ICD-10-CM

## 2018-01-01 DIAGNOSIS — R46.89 AGGRESSIVE BEHAVIOR: ICD-10-CM

## 2018-01-01 DIAGNOSIS — R31.9 URINARY TRACT INFECTION WITH HEMATURIA, SITE UNSPECIFIED: Primary | ICD-10-CM

## 2018-01-01 DIAGNOSIS — N39.0 URINARY TRACT INFECTION WITH HEMATURIA, SITE UNSPECIFIED: Primary | ICD-10-CM

## 2018-01-01 LAB
ALBUMIN SERPL-MCNC: 3.6 G/DL (ref 3.5–5)
ALBUMIN/GLOB SERPL: 1 {RATIO} (ref 1.1–2.2)
ALP SERPL-CCNC: 114 U/L (ref 45–117)
ALT SERPL-CCNC: 17 U/L (ref 12–78)
ANION GAP SERPL CALC-SCNC: 10 MMOL/L (ref 5–15)
ANION GAP SERPL CALC-SCNC: 11 MMOL/L (ref 5–15)
ANION GAP SERPL CALC-SCNC: 6 MMOL/L (ref 5–15)
APPEARANCE UR: ABNORMAL
APPEARANCE UR: ABNORMAL
AST SERPL-CCNC: 21 U/L (ref 15–37)
ATRIAL RATE: 70 BPM
ATRIAL RATE: 75 BPM
BACTERIA SPEC CULT: ABNORMAL
BACTERIA SPEC CULT: ABNORMAL
BACTERIA SPEC CULT: NORMAL
BACTERIA URNS QL MICRO: ABNORMAL /HPF
BACTERIA URNS QL MICRO: NEGATIVE /HPF
BASE DEFICIT BLDV-SCNC: 0.2 MMOL/L
BASOPHILS # BLD: 0 K/UL (ref 0–0.1)
BASOPHILS # BLD: 0.1 K/UL (ref 0–0.1)
BASOPHILS # BLD: 0.1 K/UL (ref 0–0.1)
BASOPHILS NFR BLD: 0 % (ref 0–1)
BASOPHILS NFR BLD: 1 % (ref 0–1)
BASOPHILS NFR BLD: 1 % (ref 0–1)
BDY SITE: ABNORMAL
BILIRUB SERPL-MCNC: 0.3 MG/DL (ref 0.2–1)
BILIRUB UR QL: NEGATIVE
BILIRUB UR QL: NEGATIVE
BNP SERPL-MCNC: 773 PG/ML (ref 0–450)
BUN SERPL-MCNC: 12 MG/DL (ref 6–20)
BUN SERPL-MCNC: 13 MG/DL (ref 6–20)
BUN SERPL-MCNC: 14 MG/DL (ref 6–20)
BUN/CREAT SERPL: 15 (ref 12–20)
BUN/CREAT SERPL: 15 (ref 12–20)
BUN/CREAT SERPL: 20 (ref 12–20)
CALCIUM SERPL-MCNC: 9.2 MG/DL (ref 8.5–10.1)
CALCIUM SERPL-MCNC: 9.2 MG/DL (ref 8.5–10.1)
CALCIUM SERPL-MCNC: 9.5 MG/DL (ref 8.5–10.1)
CALCULATED P AXIS, ECG09: 70 DEGREES
CALCULATED P AXIS, ECG09: 76 DEGREES
CALCULATED R AXIS, ECG10: 60 DEGREES
CALCULATED R AXIS, ECG10: 76 DEGREES
CALCULATED T AXIS, ECG11: 55 DEGREES
CALCULATED T AXIS, ECG11: 77 DEGREES
CC UR VC: ABNORMAL
CC UR VC: NORMAL
CHLORIDE SERPL-SCNC: 102 MMOL/L (ref 97–108)
CHLORIDE SERPL-SCNC: 104 MMOL/L (ref 97–108)
CHLORIDE SERPL-SCNC: 106 MMOL/L (ref 97–108)
CO2 SERPL-SCNC: 23 MMOL/L (ref 21–32)
CO2 SERPL-SCNC: 25 MMOL/L (ref 21–32)
CO2 SERPL-SCNC: 28 MMOL/L (ref 21–32)
COLOR UR: ABNORMAL
COLOR UR: ABNORMAL
CREAT SERPL-MCNC: 0.7 MG/DL (ref 0.55–1.02)
CREAT SERPL-MCNC: 0.78 MG/DL (ref 0.55–1.02)
CREAT SERPL-MCNC: 0.88 MG/DL (ref 0.55–1.02)
DIAGNOSIS, 93000: NORMAL
DIAGNOSIS, 93000: NORMAL
DIFFERENTIAL METHOD BLD: ABNORMAL
EOSINOPHIL # BLD: 0.1 K/UL (ref 0–0.4)
EOSINOPHIL # BLD: 0.1 K/UL (ref 0–0.4)
EOSINOPHIL # BLD: 0.3 K/UL (ref 0–0.4)
EOSINOPHIL NFR BLD: 1 % (ref 0–7)
EOSINOPHIL NFR BLD: 1 % (ref 0–7)
EOSINOPHIL NFR BLD: 3 % (ref 0–7)
EPITH CASTS URNS QL MICRO: ABNORMAL /LPF
EPITH CASTS URNS QL MICRO: ABNORMAL /LPF
ERYTHROCYTE [DISTWIDTH] IN BLOOD BY AUTOMATED COUNT: 12.7 % (ref 11.5–14.5)
ERYTHROCYTE [DISTWIDTH] IN BLOOD BY AUTOMATED COUNT: 14.7 % (ref 11.5–14.5)
ERYTHROCYTE [DISTWIDTH] IN BLOOD BY AUTOMATED COUNT: 14.9 % (ref 11.5–14.5)
ERYTHROCYTE [DISTWIDTH] IN BLOOD BY AUTOMATED COUNT: 15.1 % (ref 11.5–14.5)
EST. AVERAGE GLUCOSE BLD GHB EST-MCNC: 134 MG/DL
EST. AVERAGE GLUCOSE BLD GHB EST-MCNC: 134 MG/DL
GLOBULIN SER CALC-MCNC: 3.6 G/DL (ref 2–4)
GLUCOSE BLD STRIP.AUTO-MCNC: 106 MG/DL (ref 65–100)
GLUCOSE BLD STRIP.AUTO-MCNC: 129 MG/DL (ref 65–100)
GLUCOSE BLD STRIP.AUTO-MCNC: 131 MG/DL (ref 65–100)
GLUCOSE BLD STRIP.AUTO-MCNC: 132 MG/DL (ref 65–100)
GLUCOSE BLD STRIP.AUTO-MCNC: 134 MG/DL (ref 65–100)
GLUCOSE BLD STRIP.AUTO-MCNC: 135 MG/DL (ref 65–100)
GLUCOSE BLD STRIP.AUTO-MCNC: 135 MG/DL (ref 65–100)
GLUCOSE BLD STRIP.AUTO-MCNC: 138 MG/DL (ref 65–100)
GLUCOSE BLD STRIP.AUTO-MCNC: 140 MG/DL (ref 65–100)
GLUCOSE BLD STRIP.AUTO-MCNC: 141 MG/DL (ref 65–100)
GLUCOSE BLD STRIP.AUTO-MCNC: 142 MG/DL (ref 65–100)
GLUCOSE BLD STRIP.AUTO-MCNC: 146 MG/DL (ref 65–100)
GLUCOSE BLD STRIP.AUTO-MCNC: 147 MG/DL (ref 65–100)
GLUCOSE BLD STRIP.AUTO-MCNC: 150 MG/DL (ref 65–100)
GLUCOSE BLD STRIP.AUTO-MCNC: 150 MG/DL (ref 65–100)
GLUCOSE BLD STRIP.AUTO-MCNC: 151 MG/DL (ref 65–100)
GLUCOSE BLD STRIP.AUTO-MCNC: 151 MG/DL (ref 65–100)
GLUCOSE BLD STRIP.AUTO-MCNC: 156 MG/DL (ref 65–100)
GLUCOSE BLD STRIP.AUTO-MCNC: 156 MG/DL (ref 65–100)
GLUCOSE BLD STRIP.AUTO-MCNC: 159 MG/DL (ref 65–100)
GLUCOSE BLD STRIP.AUTO-MCNC: 160 MG/DL (ref 65–100)
GLUCOSE BLD STRIP.AUTO-MCNC: 161 MG/DL (ref 65–100)
GLUCOSE BLD STRIP.AUTO-MCNC: 161 MG/DL (ref 65–100)
GLUCOSE BLD STRIP.AUTO-MCNC: 163 MG/DL (ref 65–100)
GLUCOSE BLD STRIP.AUTO-MCNC: 165 MG/DL (ref 65–100)
GLUCOSE BLD STRIP.AUTO-MCNC: 166 MG/DL (ref 65–100)
GLUCOSE BLD STRIP.AUTO-MCNC: 167 MG/DL (ref 65–100)
GLUCOSE BLD STRIP.AUTO-MCNC: 168 MG/DL (ref 65–100)
GLUCOSE BLD STRIP.AUTO-MCNC: 168 MG/DL (ref 65–100)
GLUCOSE BLD STRIP.AUTO-MCNC: 169 MG/DL (ref 65–100)
GLUCOSE BLD STRIP.AUTO-MCNC: 169 MG/DL (ref 65–100)
GLUCOSE BLD STRIP.AUTO-MCNC: 170 MG/DL (ref 65–100)
GLUCOSE BLD STRIP.AUTO-MCNC: 170 MG/DL (ref 65–100)
GLUCOSE BLD STRIP.AUTO-MCNC: 172 MG/DL (ref 65–100)
GLUCOSE BLD STRIP.AUTO-MCNC: 172 MG/DL (ref 65–100)
GLUCOSE BLD STRIP.AUTO-MCNC: 173 MG/DL (ref 65–100)
GLUCOSE BLD STRIP.AUTO-MCNC: 175 MG/DL (ref 65–100)
GLUCOSE BLD STRIP.AUTO-MCNC: 177 MG/DL (ref 65–100)
GLUCOSE BLD STRIP.AUTO-MCNC: 178 MG/DL (ref 65–100)
GLUCOSE BLD STRIP.AUTO-MCNC: 179 MG/DL (ref 65–100)
GLUCOSE BLD STRIP.AUTO-MCNC: 181 MG/DL (ref 65–100)
GLUCOSE BLD STRIP.AUTO-MCNC: 182 MG/DL (ref 65–100)
GLUCOSE BLD STRIP.AUTO-MCNC: 183 MG/DL (ref 65–100)
GLUCOSE BLD STRIP.AUTO-MCNC: 186 MG/DL (ref 65–100)
GLUCOSE BLD STRIP.AUTO-MCNC: 190 MG/DL (ref 65–100)
GLUCOSE BLD STRIP.AUTO-MCNC: 190 MG/DL (ref 65–100)
GLUCOSE BLD STRIP.AUTO-MCNC: 197 MG/DL (ref 65–100)
GLUCOSE BLD STRIP.AUTO-MCNC: 199 MG/DL (ref 65–100)
GLUCOSE BLD STRIP.AUTO-MCNC: 200 MG/DL (ref 65–100)
GLUCOSE BLD STRIP.AUTO-MCNC: 200 MG/DL (ref 65–100)
GLUCOSE BLD STRIP.AUTO-MCNC: 206 MG/DL (ref 65–100)
GLUCOSE BLD STRIP.AUTO-MCNC: 208 MG/DL (ref 65–100)
GLUCOSE BLD STRIP.AUTO-MCNC: 209 MG/DL (ref 65–100)
GLUCOSE BLD STRIP.AUTO-MCNC: 209 MG/DL (ref 65–100)
GLUCOSE BLD STRIP.AUTO-MCNC: 211 MG/DL (ref 65–100)
GLUCOSE BLD STRIP.AUTO-MCNC: 220 MG/DL (ref 65–100)
GLUCOSE BLD STRIP.AUTO-MCNC: 231 MG/DL (ref 65–100)
GLUCOSE BLD STRIP.AUTO-MCNC: 232 MG/DL (ref 65–100)
GLUCOSE BLD STRIP.AUTO-MCNC: 234 MG/DL (ref 65–100)
GLUCOSE BLD STRIP.AUTO-MCNC: 238 MG/DL (ref 65–100)
GLUCOSE BLD STRIP.AUTO-MCNC: 240 MG/DL (ref 65–100)
GLUCOSE BLD STRIP.AUTO-MCNC: 249 MG/DL (ref 65–100)
GLUCOSE BLD STRIP.AUTO-MCNC: 253 MG/DL (ref 65–100)
GLUCOSE BLD STRIP.AUTO-MCNC: 267 MG/DL (ref 65–100)
GLUCOSE BLD STRIP.AUTO-MCNC: 267 MG/DL (ref 65–100)
GLUCOSE BLD STRIP.AUTO-MCNC: 279 MG/DL (ref 65–100)
GLUCOSE BLD STRIP.AUTO-MCNC: 360 MG/DL (ref 65–100)
GLUCOSE BLD STRIP.AUTO-MCNC: 395 MG/DL (ref 65–100)
GLUCOSE BLD STRIP.AUTO-MCNC: 400 MG/DL (ref 65–100)
GLUCOSE SERPL-MCNC: 132 MG/DL (ref 65–100)
GLUCOSE SERPL-MCNC: 133 MG/DL (ref 65–100)
GLUCOSE SERPL-MCNC: 232 MG/DL (ref 65–100)
GLUCOSE UR STRIP.AUTO-MCNC: NEGATIVE MG/DL
GLUCOSE UR STRIP.AUTO-MCNC: NEGATIVE MG/DL
GRAM STN SPEC: ABNORMAL
GRAM STN SPEC: ABNORMAL
HBA1C MFR BLD: 6.3 % (ref 4.2–6.3)
HBA1C MFR BLD: 6.3 % (ref 4.2–6.3)
HCO3 BLDV-SCNC: 23 MMOL/L (ref 23–28)
HCT VFR BLD AUTO: 33.3 % (ref 35–47)
HCT VFR BLD AUTO: 33.4 % (ref 35–47)
HCT VFR BLD AUTO: 39.8 % (ref 35–47)
HCT VFR BLD AUTO: 43.9 % (ref 35–47)
HGB BLD-MCNC: 10.2 G/DL (ref 11.5–16)
HGB BLD-MCNC: 10.3 G/DL (ref 11.5–16)
HGB BLD-MCNC: 12.2 G/DL (ref 11.5–16)
HGB BLD-MCNC: 14.4 G/DL (ref 11.5–16)
HGB UR QL STRIP: ABNORMAL
HGB UR QL STRIP: NEGATIVE
IMM GRANULOCYTES # BLD: 0 K/UL (ref 0–0.04)
IMM GRANULOCYTES # BLD: 0.1 K/UL (ref 0–0.04)
IMM GRANULOCYTES # BLD: 0.1 K/UL (ref 0–0.04)
IMM GRANULOCYTES NFR BLD AUTO: 0 % (ref 0–0.5)
IMM GRANULOCYTES NFR BLD AUTO: 0 % (ref 0–0.5)
IMM GRANULOCYTES NFR BLD AUTO: 1 % (ref 0–0.5)
INR PPP: 1 (ref 0.9–1.1)
KETONES UR QL STRIP.AUTO: NEGATIVE MG/DL
KETONES UR QL STRIP.AUTO: NEGATIVE MG/DL
LACTATE SERPL-SCNC: 1.4 MMOL/L (ref 0.4–2)
LEUKOCYTE ESTERASE UR QL STRIP.AUTO: ABNORMAL
LEUKOCYTE ESTERASE UR QL STRIP.AUTO: ABNORMAL
LYMPHOCYTES # BLD: 2.9 K/UL (ref 0.8–3.5)
LYMPHOCYTES # BLD: 3.2 K/UL (ref 0.8–3.5)
LYMPHOCYTES # BLD: 4.4 K/UL (ref 0.8–3.5)
LYMPHOCYTES NFR BLD: 26 % (ref 12–49)
LYMPHOCYTES NFR BLD: 34 % (ref 12–49)
LYMPHOCYTES NFR BLD: 36 % (ref 12–49)
MCH RBC QN AUTO: 29.6 PG (ref 26–34)
MCH RBC QN AUTO: 29.7 PG (ref 26–34)
MCH RBC QN AUTO: 29.7 PG (ref 26–34)
MCH RBC QN AUTO: 30 PG (ref 26–34)
MCHC RBC AUTO-ENTMCNC: 30.5 G/DL (ref 30–36.5)
MCHC RBC AUTO-ENTMCNC: 30.7 G/DL (ref 30–36.5)
MCHC RBC AUTO-ENTMCNC: 30.9 G/DL (ref 30–36.5)
MCHC RBC AUTO-ENTMCNC: 32.8 G/DL (ref 30–36.5)
MCV RBC AUTO: 90.5 FL (ref 80–99)
MCV RBC AUTO: 95.7 FL (ref 80–99)
MCV RBC AUTO: 97.4 FL (ref 80–99)
MCV RBC AUTO: 98 FL (ref 80–99)
MONOCYTES # BLD: 0.8 K/UL (ref 0–1)
MONOCYTES NFR BLD: 6 % (ref 5–13)
MONOCYTES NFR BLD: 6 % (ref 5–13)
MONOCYTES NFR BLD: 9 % (ref 5–13)
NEUTS SEG # BLD: 4.5 K/UL (ref 1.8–8)
NEUTS SEG # BLD: 6.9 K/UL (ref 1.8–8)
NEUTS SEG # BLD: 8 K/UL (ref 1.8–8)
NEUTS SEG NFR BLD: 53 % (ref 32–75)
NEUTS SEG NFR BLD: 57 % (ref 32–75)
NEUTS SEG NFR BLD: 66 % (ref 32–75)
NITRITE UR QL STRIP.AUTO: NEGATIVE
NITRITE UR QL STRIP.AUTO: POSITIVE
NRBC # BLD: 0 K/UL (ref 0–0.01)
NRBC BLD-RTO: 0 PER 100 WBC
P-R INTERVAL, ECG05: 144 MS
P-R INTERVAL, ECG05: 146 MS
PCO2 BLDV: 34 MMHG (ref 41–51)
PH BLDV: 7.45 [PH] (ref 7.32–7.42)
PH UR STRIP: 5.5 [PH] (ref 5–8)
PH UR STRIP: 6.5 [PH] (ref 5–8)
PLATELET # BLD AUTO: 288 K/UL (ref 150–400)
PLATELET # BLD AUTO: 438 K/UL (ref 150–400)
PLATELET # BLD AUTO: 455 K/UL (ref 150–400)
PLATELET # BLD AUTO: 463 K/UL (ref 150–400)
PMV BLD AUTO: 10 FL (ref 8.9–12.9)
PMV BLD AUTO: 10.3 FL (ref 8.9–12.9)
PMV BLD AUTO: 10.7 FL (ref 8.9–12.9)
PMV BLD AUTO: 10.7 FL (ref 8.9–12.9)
PO2 BLDV: 80 MMHG (ref 25–40)
POTASSIUM SERPL-SCNC: 3.8 MMOL/L (ref 3.5–5.1)
POTASSIUM SERPL-SCNC: 4.3 MMOL/L (ref 3.5–5.1)
POTASSIUM SERPL-SCNC: 5.1 MMOL/L (ref 3.5–5.1)
PROT SERPL-MCNC: 7.2 G/DL (ref 6.4–8.2)
PROT UR STRIP-MCNC: NEGATIVE MG/DL
PROT UR STRIP-MCNC: NEGATIVE MG/DL
PROTHROMBIN TIME: 9.9 SEC (ref 9–11.1)
Q-T INTERVAL, ECG07: 380 MS
Q-T INTERVAL, ECG07: 456 MS
QRS DURATION, ECG06: 78 MS
QRS DURATION, ECG06: 80 MS
QTC CALCULATION (BEZET), ECG08: 424 MS
QTC CALCULATION (BEZET), ECG08: 492 MS
RBC # BLD AUTO: 3.43 M/UL (ref 3.8–5.2)
RBC # BLD AUTO: 3.48 M/UL (ref 3.8–5.2)
RBC # BLD AUTO: 4.06 M/UL (ref 3.8–5.2)
RBC # BLD AUTO: 4.85 M/UL (ref 3.8–5.2)
RBC #/AREA URNS HPF: ABNORMAL /HPF (ref 0–5)
RBC #/AREA URNS HPF: ABNORMAL /HPF (ref 0–5)
SAO2 % BLDV: 96 % (ref 65–88)
SAO2% DEVICE SAO2% SENSOR NAME: ABNORMAL
SERVICE CMNT-IMP: ABNORMAL
SERVICE CMNT-IMP: NORMAL
SERVICE CMNT-IMP: NORMAL
SODIUM SERPL-SCNC: 137 MMOL/L (ref 136–145)
SODIUM SERPL-SCNC: 138 MMOL/L (ref 136–145)
SODIUM SERPL-SCNC: 140 MMOL/L (ref 136–145)
SP GR UR REFRACTOMETRY: 1.01 (ref 1–1.03)
SP GR UR REFRACTOMETRY: 1.01 (ref 1–1.03)
SPECIMEN SITE: ABNORMAL
TROPONIN I SERPL-MCNC: <0.05 NG/ML
UR CULT HOLD, URHOLD: NORMAL
UR CULT HOLD, URHOLD: NORMAL
UROBILINOGEN UR QL STRIP.AUTO: 0.2 EU/DL (ref 0.2–1)
UROBILINOGEN UR QL STRIP.AUTO: 0.2 EU/DL (ref 0.2–1)
VENTRICULAR RATE, ECG03: 70 BPM
VENTRICULAR RATE, ECG03: 75 BPM
WBC # BLD AUTO: 12.2 K/UL (ref 3.6–11)
WBC # BLD AUTO: 12.2 K/UL (ref 3.6–11)
WBC # BLD AUTO: 8.5 K/UL (ref 3.6–11)
WBC # BLD AUTO: 8.8 K/UL (ref 3.6–11)
WBC URNS QL MICRO: ABNORMAL /HPF (ref 0–4)
WBC URNS QL MICRO: ABNORMAL /HPF (ref 0–4)
YEAST BUDDING URNS QL: PRESENT

## 2018-01-01 PROCEDURE — 74011250636 HC RX REV CODE- 250/636: Performed by: PSYCHIATRY & NEUROLOGY

## 2018-01-01 PROCEDURE — 96365 THER/PROPH/DIAG IV INF INIT: CPT

## 2018-01-01 PROCEDURE — 74011250637 HC RX REV CODE- 250/637: Performed by: NURSE PRACTITIONER

## 2018-01-01 PROCEDURE — 82962 GLUCOSE BLOOD TEST: CPT

## 2018-01-01 PROCEDURE — 94640 AIRWAY INHALATION TREATMENT: CPT

## 2018-01-01 PROCEDURE — 77030011256 HC DRSG MEPILEX <16IN NO BORD MOLN -A

## 2018-01-01 PROCEDURE — 74011250637 HC RX REV CODE- 250/637: Performed by: PSYCHIATRY & NEUROLOGY

## 2018-01-01 PROCEDURE — 87086 URINE CULTURE/COLONY COUNT: CPT | Performed by: INTERNAL MEDICINE

## 2018-01-01 PROCEDURE — 74011250637 HC RX REV CODE- 250/637: Performed by: FAMILY MEDICINE

## 2018-01-01 PROCEDURE — 74011250636 HC RX REV CODE- 250/636: Performed by: EMERGENCY MEDICINE

## 2018-01-01 PROCEDURE — 94761 N-INVAS EAR/PLS OXIMETRY MLT: CPT

## 2018-01-01 PROCEDURE — 81001 URINALYSIS AUTO W/SCOPE: CPT | Performed by: EMERGENCY MEDICINE

## 2018-01-01 PROCEDURE — 74011250637 HC RX REV CODE- 250/637: Performed by: INTERNAL MEDICINE

## 2018-01-01 PROCEDURE — 93005 ELECTROCARDIOGRAM TRACING: CPT

## 2018-01-01 PROCEDURE — 87077 CULTURE AEROBIC IDENTIFY: CPT | Performed by: INTERNAL MEDICINE

## 2018-01-01 PROCEDURE — 74011000250 HC RX REV CODE- 250: Performed by: PSYCHIATRY & NEUROLOGY

## 2018-01-01 PROCEDURE — 97530 THERAPEUTIC ACTIVITIES: CPT | Performed by: OCCUPATIONAL THERAPIST

## 2018-01-01 PROCEDURE — 65270000032 HC RM SEMIPRIVATE

## 2018-01-01 PROCEDURE — 83036 HEMOGLOBIN GLYCOSYLATED A1C: CPT | Performed by: FAMILY MEDICINE

## 2018-01-01 PROCEDURE — 74011000250 HC RX REV CODE- 250: Performed by: NURSE PRACTITIONER

## 2018-01-01 PROCEDURE — 97165 OT EVAL LOW COMPLEX 30 MIN: CPT

## 2018-01-01 PROCEDURE — 77030013140 HC MSK NEB VYRM -A

## 2018-01-01 PROCEDURE — 36415 COLL VENOUS BLD VENIPUNCTURE: CPT | Performed by: EMERGENCY MEDICINE

## 2018-01-01 PROCEDURE — 74011636637 HC RX REV CODE- 636/637: Performed by: FAMILY MEDICINE

## 2018-01-01 PROCEDURE — 85025 COMPLETE CBC W/AUTO DIFF WBC: CPT | Performed by: EMERGENCY MEDICINE

## 2018-01-01 PROCEDURE — 87086 URINE CULTURE/COLONY COUNT: CPT | Performed by: EMERGENCY MEDICINE

## 2018-01-01 PROCEDURE — 65660000000 HC RM CCU STEPDOWN

## 2018-01-01 PROCEDURE — 74011000258 HC RX REV CODE- 258: Performed by: FAMILY MEDICINE

## 2018-01-01 PROCEDURE — 74011250637 HC RX REV CODE- 250/637: Performed by: EMERGENCY MEDICINE

## 2018-01-01 PROCEDURE — 80048 BASIC METABOLIC PNL TOTAL CA: CPT | Performed by: FAMILY MEDICINE

## 2018-01-01 PROCEDURE — 97116 GAIT TRAINING THERAPY: CPT

## 2018-01-01 PROCEDURE — 36415 COLL VENOUS BLD VENIPUNCTURE: CPT | Performed by: FAMILY MEDICINE

## 2018-01-01 PROCEDURE — 94760 N-INVAS EAR/PLS OXIMETRY 1: CPT

## 2018-01-01 PROCEDURE — 74011250636 HC RX REV CODE- 250/636: Performed by: FAMILY MEDICINE

## 2018-01-01 PROCEDURE — 85610 PROTHROMBIN TIME: CPT | Performed by: EMERGENCY MEDICINE

## 2018-01-01 PROCEDURE — 71045 X-RAY EXAM CHEST 1 VIEW: CPT

## 2018-01-01 PROCEDURE — 97535 SELF CARE MNGMENT TRAINING: CPT | Performed by: OCCUPATIONAL THERAPIST

## 2018-01-01 PROCEDURE — 74011250636 HC RX REV CODE- 250/636: Performed by: INTERNAL MEDICINE

## 2018-01-01 PROCEDURE — 96372 THER/PROPH/DIAG INJ SC/IM: CPT

## 2018-01-01 PROCEDURE — 99285 EMERGENCY DEPT VISIT HI MDM: CPT

## 2018-01-01 PROCEDURE — 90791 PSYCH DIAGNOSTIC EVALUATION: CPT

## 2018-01-01 PROCEDURE — 85027 COMPLETE CBC AUTOMATED: CPT | Performed by: NURSE PRACTITIONER

## 2018-01-01 PROCEDURE — 73630 X-RAY EXAM OF FOOT: CPT

## 2018-01-01 PROCEDURE — 97161 PT EVAL LOW COMPLEX 20 MIN: CPT

## 2018-01-01 PROCEDURE — 96374 THER/PROPH/DIAG INJ IV PUSH: CPT

## 2018-01-01 PROCEDURE — 74011000258 HC RX REV CODE- 258: Performed by: EMERGENCY MEDICINE

## 2018-01-01 PROCEDURE — 74011000250 HC RX REV CODE- 250: Performed by: EMERGENCY MEDICINE

## 2018-01-01 PROCEDURE — 97530 THERAPEUTIC ACTIVITIES: CPT

## 2018-01-01 PROCEDURE — 96375 TX/PRO/DX INJ NEW DRUG ADDON: CPT

## 2018-01-01 PROCEDURE — 85025 COMPLETE CBC W/AUTO DIFF WBC: CPT | Performed by: FAMILY MEDICINE

## 2018-01-01 PROCEDURE — 87205 SMEAR GRAM STAIN: CPT | Performed by: FAMILY MEDICINE

## 2018-01-01 PROCEDURE — 87186 SC STD MICRODIL/AGAR DIL: CPT | Performed by: INTERNAL MEDICINE

## 2018-01-01 PROCEDURE — 80053 COMPREHEN METABOLIC PANEL: CPT | Performed by: EMERGENCY MEDICINE

## 2018-01-01 PROCEDURE — 83880 ASSAY OF NATRIURETIC PEPTIDE: CPT | Performed by: EMERGENCY MEDICINE

## 2018-01-01 PROCEDURE — 84484 ASSAY OF TROPONIN QUANT: CPT | Performed by: EMERGENCY MEDICINE

## 2018-01-01 PROCEDURE — 83605 ASSAY OF LACTIC ACID: CPT | Performed by: EMERGENCY MEDICINE

## 2018-01-01 PROCEDURE — 83036 HEMOGLOBIN GLYCOSYLATED A1C: CPT | Performed by: NURSE PRACTITIONER

## 2018-01-01 PROCEDURE — 65270000029 HC RM PRIVATE

## 2018-01-01 PROCEDURE — 82803 BLOOD GASES ANY COMBINATION: CPT | Performed by: EMERGENCY MEDICINE

## 2018-01-01 RX ORDER — FACIAL-BODY WIPES
10 EACH TOPICAL DAILY PRN
Status: DISCONTINUED | OUTPATIENT
Start: 2018-01-01 | End: 2018-01-01 | Stop reason: HOSPADM

## 2018-01-01 RX ORDER — LORAZEPAM 0.5 MG/1
0.5 TABLET ORAL
COMMUNITY

## 2018-01-01 RX ORDER — MELATONIN
2000 DAILY
Status: DISCONTINUED | OUTPATIENT
Start: 2018-01-01 | End: 2018-01-01 | Stop reason: HOSPADM

## 2018-01-01 RX ORDER — SODIUM CHLORIDE 0.9 % (FLUSH) 0.9 %
5-10 SYRINGE (ML) INJECTION AS NEEDED
Status: DISCONTINUED | OUTPATIENT
Start: 2018-01-01 | End: 2018-01-01 | Stop reason: HOSPADM

## 2018-01-01 RX ORDER — BISACODYL 5 MG
5 TABLET, DELAYED RELEASE (ENTERIC COATED) ORAL DAILY PRN
Status: DISCONTINUED | OUTPATIENT
Start: 2018-01-01 | End: 2018-01-01 | Stop reason: HOSPADM

## 2018-01-01 RX ORDER — GABAPENTIN 400 MG/1
800 CAPSULE ORAL
Status: DISCONTINUED | OUTPATIENT
Start: 2018-01-01 | End: 2018-01-01 | Stop reason: HOSPADM

## 2018-01-01 RX ORDER — INSULIN LISPRO 100 [IU]/ML
INJECTION, SOLUTION INTRAVENOUS; SUBCUTANEOUS
Status: DISCONTINUED | OUTPATIENT
Start: 2018-01-01 | End: 2018-01-01 | Stop reason: HOSPADM

## 2018-01-01 RX ORDER — HALOPERIDOL 5 MG/ML
1 INJECTION INTRAMUSCULAR
Status: DISCONTINUED | OUTPATIENT
Start: 2018-01-01 | End: 2018-01-01 | Stop reason: HOSPADM

## 2018-01-01 RX ORDER — HALOPERIDOL 5 MG/ML
2 INJECTION INTRAMUSCULAR
Status: DISCONTINUED | OUTPATIENT
Start: 2018-01-01 | End: 2018-01-01 | Stop reason: HOSPADM

## 2018-01-01 RX ORDER — HALOPERIDOL 5 MG/ML
5 INJECTION INTRAMUSCULAR
Status: COMPLETED | OUTPATIENT
Start: 2018-01-01 | End: 2018-01-01

## 2018-01-01 RX ORDER — ACETAMINOPHEN 325 MG/1
650 TABLET ORAL
Status: DISCONTINUED | OUTPATIENT
Start: 2018-01-01 | End: 2018-01-01 | Stop reason: HOSPADM

## 2018-01-01 RX ORDER — DOXYCYCLINE HYCLATE 100 MG
100 TABLET ORAL EVERY 12 HOURS
Status: DISCONTINUED | OUTPATIENT
Start: 2018-01-01 | End: 2018-01-01 | Stop reason: HOSPADM

## 2018-01-01 RX ORDER — ADHESIVE BANDAGE
30 BANDAGE TOPICAL DAILY PRN
Status: DISCONTINUED | OUTPATIENT
Start: 2018-01-01 | End: 2018-01-01 | Stop reason: HOSPADM

## 2018-01-01 RX ORDER — GALANTAMINE HYDROBROMIDE 8 MG/1
8 TABLET, FILM COATED ORAL DAILY
COMMUNITY
End: 2018-01-01

## 2018-01-01 RX ORDER — QUETIAPINE FUMARATE 25 MG/1
12.5 TABLET, FILM COATED ORAL 2 TIMES DAILY
Status: DISCONTINUED | OUTPATIENT
Start: 2018-01-01 | End: 2018-01-01

## 2018-01-01 RX ORDER — NYSTATIN 100000 [USP'U]/G
POWDER TOPICAL 3 TIMES DAILY
Status: DISCONTINUED | OUTPATIENT
Start: 2018-01-01 | End: 2018-01-01

## 2018-01-01 RX ORDER — IPRATROPIUM BROMIDE AND ALBUTEROL SULFATE 2.5; .5 MG/3ML; MG/3ML
3 SOLUTION RESPIRATORY (INHALATION)
Status: DISCONTINUED | OUTPATIENT
Start: 2018-01-01 | End: 2018-01-01 | Stop reason: HOSPADM

## 2018-01-01 RX ORDER — DEXTROSE 50 % IN WATER (D50W) INTRAVENOUS SYRINGE
12.5-25 AS NEEDED
Status: DISCONTINUED | OUTPATIENT
Start: 2018-01-01 | End: 2018-01-01 | Stop reason: HOSPADM

## 2018-01-01 RX ORDER — CHROMIUM PICOLINATE 200 MCG
1 TABLET ORAL DAILY
COMMUNITY

## 2018-01-01 RX ORDER — PROPRANOLOL HYDROCHLORIDE 20 MG/1
20 TABLET ORAL
Status: DISCONTINUED | OUTPATIENT
Start: 2018-01-01 | End: 2018-01-01 | Stop reason: HOSPADM

## 2018-01-01 RX ORDER — MICONAZOLE NITRATE 20 MG/G
CREAM TOPICAL 2 TIMES DAILY
Status: DISCONTINUED | OUTPATIENT
Start: 2018-01-01 | End: 2018-01-01 | Stop reason: HOSPADM

## 2018-01-01 RX ORDER — BISACODYL 5 MG
5 TABLET, DELAYED RELEASE (ENTERIC COATED) ORAL DAILY PRN
COMMUNITY

## 2018-01-01 RX ORDER — DOXYCYCLINE HYCLATE 100 MG
100 TABLET ORAL ONCE
Status: COMPLETED | OUTPATIENT
Start: 2018-01-01 | End: 2018-01-01

## 2018-01-01 RX ORDER — FACIAL-BODY WIPES
10 EACH TOPICAL
Status: ACTIVE | OUTPATIENT
Start: 2018-01-01 | End: 2018-01-01

## 2018-01-01 RX ORDER — ALBUTEROL SULFATE 1.25 MG/3ML
1.25 SOLUTION RESPIRATORY (INHALATION)
Qty: 1 EACH | Refills: 0 | Status: SHIPPED
Start: 2018-01-01

## 2018-01-01 RX ORDER — DONEPEZIL HYDROCHLORIDE 5 MG/1
5 TABLET, FILM COATED ORAL
COMMUNITY
End: 2018-01-01

## 2018-01-01 RX ORDER — DEXTROSE 50 % IN WATER (D50W) INTRAVENOUS SYRINGE
25-50 AS NEEDED
Status: DISCONTINUED | OUTPATIENT
Start: 2018-01-01 | End: 2018-01-01 | Stop reason: HOSPADM

## 2018-01-01 RX ORDER — GALANTAMINE 4 MG/1
8 TABLET, FILM COATED ORAL DAILY
Status: DISCONTINUED | OUTPATIENT
Start: 2018-01-01 | End: 2018-01-01 | Stop reason: HOSPADM

## 2018-01-01 RX ORDER — LORAZEPAM 0.5 MG/1
0.5 TABLET ORAL
Status: DISCONTINUED | OUTPATIENT
Start: 2018-01-01 | End: 2018-01-01 | Stop reason: HOSPADM

## 2018-01-01 RX ORDER — SENNOSIDES 8.6 MG/1
1 TABLET ORAL
Status: DISCONTINUED | OUTPATIENT
Start: 2018-01-01 | End: 2018-01-01 | Stop reason: HOSPADM

## 2018-01-01 RX ORDER — GABAPENTIN 300 MG/1
1200 CAPSULE ORAL
Status: DISCONTINUED | OUTPATIENT
Start: 2018-01-01 | End: 2018-01-01 | Stop reason: HOSPADM

## 2018-01-01 RX ORDER — SODIUM CHLORIDE 0.9 % (FLUSH) 0.9 %
5-10 SYRINGE (ML) INJECTION EVERY 8 HOURS
Status: DISCONTINUED | OUTPATIENT
Start: 2018-01-01 | End: 2018-01-01

## 2018-01-01 RX ORDER — MAGNESIUM SULFATE 100 %
4 CRYSTALS MISCELLANEOUS AS NEEDED
Status: DISCONTINUED | OUTPATIENT
Start: 2018-01-01 | End: 2018-01-01 | Stop reason: HOSPADM

## 2018-01-01 RX ORDER — AMIODARONE HYDROCHLORIDE 200 MG/1
200 TABLET ORAL EVERY OTHER DAY
Status: DISCONTINUED | OUTPATIENT
Start: 2018-01-01 | End: 2018-01-01 | Stop reason: HOSPADM

## 2018-01-01 RX ORDER — SODIUM CHLORIDE 0.9 % (FLUSH) 0.9 %
5-10 SYRINGE (ML) INJECTION EVERY 8 HOURS
Status: DISCONTINUED | OUTPATIENT
Start: 2018-01-01 | End: 2018-01-01 | Stop reason: HOSPADM

## 2018-01-01 RX ORDER — HALOPERIDOL 2 MG/1
1 TABLET ORAL 3 TIMES DAILY
Status: DISCONTINUED | OUTPATIENT
Start: 2018-01-01 | End: 2018-01-01 | Stop reason: HOSPADM

## 2018-01-01 RX ORDER — DOXYCYCLINE 100 MG/1
100 TABLET ORAL 2 TIMES DAILY
Qty: 10 TAB | Refills: 0 | Status: SHIPPED | OUTPATIENT
Start: 2018-01-01 | End: 2018-01-01

## 2018-01-01 RX ORDER — NORTRIPTYLINE HYDROCHLORIDE 10 MG/1
20 CAPSULE ORAL
Status: DISCONTINUED | OUTPATIENT
Start: 2018-01-01 | End: 2018-01-01 | Stop reason: HOSPADM

## 2018-01-01 RX ORDER — INSULIN LISPRO 100 [IU]/ML
4 INJECTION, SOLUTION INTRAVENOUS; SUBCUTANEOUS ONCE
Status: COMPLETED | OUTPATIENT
Start: 2018-01-01 | End: 2018-01-01

## 2018-01-01 RX ORDER — AMOXICILLIN 250 MG
1 CAPSULE ORAL DAILY
Qty: 30 TAB | Refills: 0 | Status: SHIPPED | OUTPATIENT
Start: 2018-01-01

## 2018-01-01 RX ORDER — QUETIAPINE FUMARATE 25 MG/1
12.5 TABLET, FILM COATED ORAL
Status: DISCONTINUED | OUTPATIENT
Start: 2018-01-01 | End: 2018-01-01

## 2018-01-01 RX ORDER — DOCUSATE SODIUM 100 MG/1
100 CAPSULE, LIQUID FILLED ORAL 2 TIMES DAILY
Status: DISCONTINUED | OUTPATIENT
Start: 2018-01-01 | End: 2018-01-01 | Stop reason: HOSPADM

## 2018-01-01 RX ORDER — CHOLECALCIFEROL (VITAMIN D3) 125 MCG
2000 CAPSULE ORAL DAILY
COMMUNITY

## 2018-01-01 RX ORDER — DONEPEZIL HYDROCHLORIDE 5 MG/1
5 TABLET, FILM COATED ORAL
Status: DISCONTINUED | OUTPATIENT
Start: 2018-01-01 | End: 2018-01-01

## 2018-01-01 RX ORDER — QUETIAPINE FUMARATE 25 MG/1
25 TABLET, FILM COATED ORAL 2 TIMES DAILY
Status: DISCONTINUED | OUTPATIENT
Start: 2018-01-01 | End: 2018-01-01

## 2018-01-01 RX ORDER — FUROSEMIDE 20 MG/1
20 TABLET ORAL DAILY
Status: DISCONTINUED | OUTPATIENT
Start: 2018-01-01 | End: 2018-01-01 | Stop reason: HOSPADM

## 2018-01-01 RX ORDER — GABAPENTIN 400 MG/1
1200 CAPSULE ORAL
Status: DISCONTINUED | OUTPATIENT
Start: 2018-01-01 | End: 2018-01-01 | Stop reason: HOSPADM

## 2018-01-01 RX ORDER — POLYVINYL ALCOHOL 14 MG/ML
1 SOLUTION/ DROPS OPHTHALMIC AS NEEDED
Status: DISCONTINUED | OUTPATIENT
Start: 2018-01-01 | End: 2018-01-01 | Stop reason: HOSPADM

## 2018-01-01 RX ORDER — PROPRANOLOL HYDROCHLORIDE 20 MG/1
20 TABLET ORAL
COMMUNITY

## 2018-01-01 RX ORDER — NORTRIPTYLINE HYDROCHLORIDE 10 MG/1
20 CAPSULE ORAL
Status: DISCONTINUED | OUTPATIENT
Start: 2018-01-01 | End: 2018-01-01

## 2018-01-01 RX ORDER — HALOPERIDOL 1 MG/1
1 TABLET ORAL 3 TIMES DAILY
Qty: 90 TAB | Refills: 0 | Status: SHIPPED | OUTPATIENT
Start: 2018-01-01

## 2018-01-01 RX ORDER — GABAPENTIN 400 MG/1
1200 CAPSULE ORAL
COMMUNITY

## 2018-01-01 RX ORDER — PROCHLORPERAZINE EDISYLATE 5 MG/ML
5 INJECTION INTRAMUSCULAR; INTRAVENOUS
Status: DISCONTINUED | OUTPATIENT
Start: 2018-01-01 | End: 2018-01-01 | Stop reason: HOSPADM

## 2018-01-01 RX ORDER — CEPHALEXIN 250 MG/1
500 CAPSULE ORAL EVERY 12 HOURS
Status: DISPENSED | OUTPATIENT
Start: 2018-01-01 | End: 2018-01-01

## 2018-01-01 RX ORDER — HALOPERIDOL 5 MG/ML
2.5 INJECTION INTRAMUSCULAR
Status: DISCONTINUED | OUTPATIENT
Start: 2018-01-01 | End: 2018-01-01

## 2018-01-01 RX ORDER — PROPRANOLOL HYDROCHLORIDE 10 MG/1
20 TABLET ORAL
Status: DISCONTINUED | OUTPATIENT
Start: 2018-01-01 | End: 2018-01-01 | Stop reason: HOSPADM

## 2018-01-01 RX ORDER — DONEPEZIL HYDROCHLORIDE 5 MG/1
5 TABLET, FILM COATED ORAL
Status: DISCONTINUED | OUTPATIENT
Start: 2018-01-01 | End: 2018-01-01 | Stop reason: HOSPADM

## 2018-01-01 RX ORDER — POLYETHYLENE GLYCOL 3350 17 G/17G
17 POWDER, FOR SOLUTION ORAL DAILY
Status: DISCONTINUED | OUTPATIENT
Start: 2018-01-01 | End: 2018-01-01 | Stop reason: HOSPADM

## 2018-01-01 RX ORDER — HYDRALAZINE HYDROCHLORIDE 20 MG/ML
10 INJECTION INTRAMUSCULAR; INTRAVENOUS ONCE
Status: COMPLETED | OUTPATIENT
Start: 2018-01-01 | End: 2018-01-01

## 2018-01-01 RX ORDER — HYDRALAZINE HYDROCHLORIDE 20 MG/ML
10 INJECTION INTRAMUSCULAR; INTRAVENOUS
Status: DISCONTINUED | OUTPATIENT
Start: 2018-01-01 | End: 2018-01-01 | Stop reason: HOSPADM

## 2018-01-01 RX ADMIN — MICONAZOLE NITRATE: 20 CREAM TOPICAL at 09:33

## 2018-01-01 RX ADMIN — VITAMIN D, TAB 1000IU (100/BT) 2000 UNITS: 25 TAB at 11:22

## 2018-01-01 RX ADMIN — BISACODYL 5 MG: 5 TABLET, COATED ORAL at 13:01

## 2018-01-01 RX ADMIN — COLLAGENASE SANTYL: 250 OINTMENT TOPICAL at 09:00

## 2018-01-01 RX ADMIN — HALOPERIDOL LACTATE 2 MG: 5 INJECTION, SOLUTION INTRAMUSCULAR at 03:39

## 2018-01-01 RX ADMIN — HALOPERIDOL 1 MG: 2 TABLET ORAL at 08:50

## 2018-01-01 RX ADMIN — HALOPERIDOL 1 MG: 2 TABLET ORAL at 16:17

## 2018-01-01 RX ADMIN — SITAGLIPTIN 100 MG: 100 TABLET, FILM COATED ORAL at 09:11

## 2018-01-01 RX ADMIN — WATER 5 MG: 1 INJECTION INTRAMUSCULAR; INTRAVENOUS; SUBCUTANEOUS at 00:23

## 2018-01-01 RX ADMIN — HALOPERIDOL 1 MG: 2 TABLET ORAL at 16:54

## 2018-01-01 RX ADMIN — POLYETHYLENE GLYCOL 3350 17 G: 17 POWDER, FOR SOLUTION ORAL at 11:47

## 2018-01-01 RX ADMIN — GABAPENTIN 800 MG: 400 CAPSULE ORAL at 14:25

## 2018-01-01 RX ADMIN — MICONAZOLE NITRATE: 20 CREAM TOPICAL at 10:14

## 2018-01-01 RX ADMIN — PROPRANOLOL HYDROCHLORIDE 20 MG: 20 TABLET ORAL at 21:15

## 2018-01-01 RX ADMIN — VITAMIN D, TAB 1000IU (100/BT) 2000 UNITS: 25 TAB at 09:45

## 2018-01-01 RX ADMIN — CEFTRIAXONE SODIUM 1 G: 1 INJECTION, POWDER, FOR SOLUTION INTRAMUSCULAR; INTRAVENOUS at 21:11

## 2018-01-01 RX ADMIN — GABAPENTIN 1200 MG: 400 CAPSULE ORAL at 22:22

## 2018-01-01 RX ADMIN — VITAMIN D, TAB 1000IU (100/BT) 2000 UNITS: 25 TAB at 09:35

## 2018-01-01 RX ADMIN — VITAMIN D, TAB 1000IU (100/BT) 2000 UNITS: 25 TAB at 10:30

## 2018-01-01 RX ADMIN — DOXYCYCLINE HYCLATE 100 MG: 100 TABLET, COATED ORAL at 22:43

## 2018-01-01 RX ADMIN — INSULIN LISPRO 1 UNITS: 100 INJECTION, SOLUTION INTRAVENOUS; SUBCUTANEOUS at 22:29

## 2018-01-01 RX ADMIN — AMIODARONE HYDROCHLORIDE 200 MG: 200 TABLET ORAL at 03:19

## 2018-01-01 RX ADMIN — DOCUSATE SODIUM 100 MG: 100 CAPSULE, LIQUID FILLED ORAL at 11:52

## 2018-01-01 RX ADMIN — MICONAZOLE NITRATE: 20 CREAM TOPICAL at 19:10

## 2018-01-01 RX ADMIN — GABAPENTIN 1200 MG: 400 CAPSULE ORAL at 22:03

## 2018-01-01 RX ADMIN — VITAMIN D, TAB 1000IU (100/BT) 2000 UNITS: 25 TAB at 08:56

## 2018-01-01 RX ADMIN — HALOPERIDOL 1 MG: 2 TABLET ORAL at 17:57

## 2018-01-01 RX ADMIN — CEPHALEXIN 500 MG: 250 CAPSULE ORAL at 21:42

## 2018-01-01 RX ADMIN — Medication 10 ML: at 14:00

## 2018-01-01 RX ADMIN — HALOPERIDOL 1 MG: 2 TABLET ORAL at 15:31

## 2018-01-01 RX ADMIN — SITAGLIPTIN 100 MG: 100 TABLET, FILM COATED ORAL at 10:30

## 2018-01-01 RX ADMIN — VANCOMYCIN HYDROCHLORIDE 1000 MG: 1 INJECTION, POWDER, LYOPHILIZED, FOR SOLUTION INTRAVENOUS at 18:00

## 2018-01-01 RX ADMIN — AMIODARONE HYDROCHLORIDE 200 MG: 200 TABLET ORAL at 11:18

## 2018-01-01 RX ADMIN — HALOPERIDOL 1 MG: 2 TABLET ORAL at 15:11

## 2018-01-01 RX ADMIN — PROPRANOLOL HYDROCHLORIDE 20 MG: 20 TABLET ORAL at 22:02

## 2018-01-01 RX ADMIN — MICONAZOLE NITRATE: 20 CREAM TOPICAL at 13:33

## 2018-01-01 RX ADMIN — FUROSEMIDE 20 MG: 20 TABLET ORAL at 11:18

## 2018-01-01 RX ADMIN — HALOPERIDOL LACTATE 5 MG: 5 INJECTION, SOLUTION INTRAMUSCULAR at 20:47

## 2018-01-01 RX ADMIN — VITAMIN D, TAB 1000IU (100/BT) 2000 UNITS: 25 TAB at 13:01

## 2018-01-01 RX ADMIN — HALOPERIDOL 1 MG: 2 TABLET ORAL at 09:33

## 2018-01-01 RX ADMIN — GABAPENTIN 800 MG: 400 CAPSULE ORAL at 12:19

## 2018-01-01 RX ADMIN — MICONAZOLE NITRATE: 20 CREAM TOPICAL at 13:04

## 2018-01-01 RX ADMIN — AMIODARONE HYDROCHLORIDE 200 MG: 200 TABLET ORAL at 09:46

## 2018-01-01 RX ADMIN — DOCUSATE SODIUM 100 MG: 100 CAPSULE, LIQUID FILLED ORAL at 17:51

## 2018-01-01 RX ADMIN — GABAPENTIN 1200 MG: 400 CAPSULE ORAL at 22:00

## 2018-01-01 RX ADMIN — GABAPENTIN 1200 MG: 400 CAPSULE ORAL at 21:43

## 2018-01-01 RX ADMIN — VITAMIN D, TAB 1000IU (100/BT) 2000 UNITS: 25 TAB at 08:14

## 2018-01-01 RX ADMIN — SITAGLIPTIN 100 MG: 100 TABLET, FILM COATED ORAL at 09:35

## 2018-01-01 RX ADMIN — MICONAZOLE NITRATE: 20 CREAM TOPICAL at 09:22

## 2018-01-01 RX ADMIN — INSULIN LISPRO 1 UNITS: 100 INJECTION, SOLUTION INTRAVENOUS; SUBCUTANEOUS at 21:37

## 2018-01-01 RX ADMIN — HALOPERIDOL 1 MG: 2 TABLET ORAL at 10:32

## 2018-01-01 RX ADMIN — QUETIAPINE FUMARATE 25 MG: 25 TABLET ORAL at 22:22

## 2018-01-01 RX ADMIN — PROPRANOLOL HYDROCHLORIDE 20 MG: 20 TABLET ORAL at 23:24

## 2018-01-01 RX ADMIN — CEPHALEXIN 500 MG: 250 CAPSULE ORAL at 09:35

## 2018-01-01 RX ADMIN — SITAGLIPTIN 100 MG: 100 TABLET, FILM COATED ORAL at 08:50

## 2018-01-01 RX ADMIN — POLYETHYLENE GLYCOL 3350 17 G: 17 POWDER, FOR SOLUTION ORAL at 09:11

## 2018-01-01 RX ADMIN — HALOPERIDOL 1 MG: 2 TABLET ORAL at 09:44

## 2018-01-01 RX ADMIN — VITAMIN D, TAB 1000IU (100/BT) 2000 UNITS: 25 TAB at 08:27

## 2018-01-01 RX ADMIN — GABAPENTIN 800 MG: 400 CAPSULE ORAL at 13:18

## 2018-01-01 RX ADMIN — GABAPENTIN 1200 MG: 400 CAPSULE ORAL at 21:28

## 2018-01-01 RX ADMIN — DOXYCYCLINE HYCLATE 100 MG: 100 TABLET, COATED ORAL at 11:18

## 2018-01-01 RX ADMIN — VITAMIN D, TAB 1000IU (100/BT) 2000 UNITS: 25 TAB at 11:19

## 2018-01-01 RX ADMIN — GABAPENTIN 800 MG: 400 CAPSULE ORAL at 15:28

## 2018-01-01 RX ADMIN — INSULIN LISPRO 1 UNITS: 100 INJECTION, SOLUTION INTRAVENOUS; SUBCUTANEOUS at 22:31

## 2018-01-01 RX ADMIN — DOCUSATE SODIUM 100 MG: 100 CAPSULE, LIQUID FILLED ORAL at 10:13

## 2018-01-01 RX ADMIN — Medication 10 ML: at 15:01

## 2018-01-01 RX ADMIN — INSULIN LISPRO 3 UNITS: 100 INJECTION, SOLUTION INTRAVENOUS; SUBCUTANEOUS at 12:30

## 2018-01-01 RX ADMIN — COLLAGENASE SANTYL: 250 OINTMENT TOPICAL at 10:15

## 2018-01-01 RX ADMIN — AMIODARONE HYDROCHLORIDE 200 MG: 200 TABLET ORAL at 09:34

## 2018-01-01 RX ADMIN — ACETAMINOPHEN 650 MG: 325 TABLET ORAL at 21:16

## 2018-01-01 RX ADMIN — SITAGLIPTIN 100 MG: 100 TABLET, FILM COATED ORAL at 13:04

## 2018-01-01 RX ADMIN — GABAPENTIN 1200 MG: 400 CAPSULE ORAL at 21:42

## 2018-01-01 RX ADMIN — CEPHALEXIN 500 MG: 250 CAPSULE ORAL at 08:58

## 2018-01-01 RX ADMIN — VITAMIN D, TAB 1000IU (100/BT) 2000 UNITS: 25 TAB at 09:34

## 2018-01-01 RX ADMIN — POLYETHYLENE GLYCOL 3350 17 G: 17 POWDER, FOR SOLUTION ORAL at 15:58

## 2018-01-01 RX ADMIN — GABAPENTIN 800 MG: 400 CAPSULE ORAL at 14:58

## 2018-01-01 RX ADMIN — SITAGLIPTIN 100 MG: 100 TABLET, FILM COATED ORAL at 08:14

## 2018-01-01 RX ADMIN — PROPRANOLOL HYDROCHLORIDE 20 MG: 20 TABLET ORAL at 22:00

## 2018-01-01 RX ADMIN — CEPHALEXIN 500 MG: 250 CAPSULE ORAL at 22:28

## 2018-01-01 RX ADMIN — QUETIAPINE FUMARATE 25 MG: 25 TABLET ORAL at 22:06

## 2018-01-01 RX ADMIN — ACETAMINOPHEN 650 MG: 325 TABLET ORAL at 00:01

## 2018-01-01 RX ADMIN — DOCUSATE SODIUM 100 MG: 100 CAPSULE, LIQUID FILLED ORAL at 18:31

## 2018-01-01 RX ADMIN — SITAGLIPTIN 100 MG: 100 TABLET, FILM COATED ORAL at 08:59

## 2018-01-01 RX ADMIN — PROPRANOLOL HYDROCHLORIDE 20 MG: 20 TABLET ORAL at 21:11

## 2018-01-01 RX ADMIN — PROPRANOLOL HYDROCHLORIDE 20 MG: 20 TABLET ORAL at 03:19

## 2018-01-01 RX ADMIN — MICONAZOLE NITRATE: 20 CREAM TOPICAL at 17:35

## 2018-01-01 RX ADMIN — GABAPENTIN 1200 MG: 400 CAPSULE ORAL at 21:14

## 2018-01-01 RX ADMIN — HALOPERIDOL 1 MG: 2 TABLET ORAL at 23:24

## 2018-01-01 RX ADMIN — GABAPENTIN 1200 MG: 400 CAPSULE ORAL at 23:35

## 2018-01-01 RX ADMIN — WATER 5 MG: 1 INJECTION INTRAMUSCULAR; INTRAVENOUS; SUBCUTANEOUS at 13:08

## 2018-01-01 RX ADMIN — DOCUSATE SODIUM 100 MG: 100 CAPSULE, LIQUID FILLED ORAL at 17:57

## 2018-01-01 RX ADMIN — GABAPENTIN 1200 MG: 400 CAPSULE ORAL at 22:31

## 2018-01-01 RX ADMIN — VITAMIN D, TAB 1000IU (100/BT) 2000 UNITS: 25 TAB at 10:13

## 2018-01-01 RX ADMIN — PROPRANOLOL HYDROCHLORIDE 20 MG: 20 TABLET ORAL at 22:04

## 2018-01-01 RX ADMIN — GABAPENTIN 800 MG: 400 CAPSULE ORAL at 14:00

## 2018-01-01 RX ADMIN — DOCUSATE SODIUM 100 MG: 100 CAPSULE, LIQUID FILLED ORAL at 08:50

## 2018-01-01 RX ADMIN — MICONAZOLE NITRATE: 20 CREAM TOPICAL at 15:31

## 2018-01-01 RX ADMIN — GABAPENTIN 800 MG: 400 CAPSULE ORAL at 13:00

## 2018-01-01 RX ADMIN — BISACODYL 5 MG: 5 TABLET, COATED ORAL at 09:47

## 2018-01-01 RX ADMIN — MICONAZOLE NITRATE: 20 CREAM TOPICAL at 09:36

## 2018-01-01 RX ADMIN — QUETIAPINE FUMARATE 12.5 MG: 25 TABLET ORAL at 21:42

## 2018-01-01 RX ADMIN — GABAPENTIN 1200 MG: 400 CAPSULE ORAL at 21:11

## 2018-01-01 RX ADMIN — DOCUSATE SODIUM 100 MG: 100 CAPSULE, LIQUID FILLED ORAL at 08:14

## 2018-01-01 RX ADMIN — MICONAZOLE NITRATE: 20 CREAM TOPICAL at 18:46

## 2018-01-01 RX ADMIN — HALOPERIDOL 1 MG: 2 TABLET ORAL at 15:59

## 2018-01-01 RX ADMIN — HALOPERIDOL 1 MG: 2 TABLET ORAL at 21:44

## 2018-01-01 RX ADMIN — ALBUTEROL SULFATE 1 DOSE: 2.5 SOLUTION RESPIRATORY (INHALATION) at 22:44

## 2018-01-01 RX ADMIN — SITAGLIPTIN 50 MG: 25 TABLET, FILM COATED ORAL at 08:27

## 2018-01-01 RX ADMIN — GABAPENTIN 1200 MG: 400 CAPSULE ORAL at 22:28

## 2018-01-01 RX ADMIN — INSULIN LISPRO 2 UNITS: 100 INJECTION, SOLUTION INTRAVENOUS; SUBCUTANEOUS at 06:40

## 2018-01-01 RX ADMIN — HALOPERIDOL 1 MG: 2 TABLET ORAL at 08:56

## 2018-01-01 RX ADMIN — SITAGLIPTIN 100 MG: 100 TABLET, FILM COATED ORAL at 09:44

## 2018-01-01 RX ADMIN — VITAMIN D, TAB 1000IU (100/BT) 2000 UNITS: 25 TAB at 08:51

## 2018-01-01 RX ADMIN — HALOPERIDOL 1 MG: 2 TABLET ORAL at 13:01

## 2018-01-01 RX ADMIN — HALOPERIDOL 1 MG: 2 TABLET ORAL at 10:46

## 2018-01-01 RX ADMIN — HALOPERIDOL 1 MG: 2 TABLET ORAL at 22:02

## 2018-01-01 RX ADMIN — DOCUSATE SODIUM 100 MG: 100 CAPSULE, LIQUID FILLED ORAL at 17:40

## 2018-01-01 RX ADMIN — WATER 5 MG: 1 INJECTION INTRAMUSCULAR; INTRAVENOUS; SUBCUTANEOUS at 08:22

## 2018-01-01 RX ADMIN — INSULIN LISPRO 2 UNITS: 100 INJECTION, SOLUTION INTRAVENOUS; SUBCUTANEOUS at 11:49

## 2018-01-01 RX ADMIN — HALOPERIDOL LACTATE 5 MG: 5 INJECTION, SOLUTION INTRAMUSCULAR at 21:43

## 2018-01-01 RX ADMIN — BISACODYL 5 MG: 5 TABLET, COATED ORAL at 10:46

## 2018-01-01 RX ADMIN — MICONAZOLE NITRATE: 20 CREAM TOPICAL at 15:12

## 2018-01-01 RX ADMIN — INSULIN LISPRO 3 UNITS: 100 INJECTION, SOLUTION INTRAVENOUS; SUBCUTANEOUS at 17:01

## 2018-01-01 RX ADMIN — HALOPERIDOL 1 MG: 2 TABLET ORAL at 21:23

## 2018-01-01 RX ADMIN — POLYETHYLENE GLYCOL 3350 17 G: 17 POWDER, FOR SOLUTION ORAL at 10:13

## 2018-01-01 RX ADMIN — PROPRANOLOL HYDROCHLORIDE 20 MG: 20 TABLET ORAL at 22:28

## 2018-01-01 RX ADMIN — AMIODARONE HYDROCHLORIDE 200 MG: 200 TABLET ORAL at 02:30

## 2018-01-01 RX ADMIN — AMIODARONE HYDROCHLORIDE 200 MG: 200 TABLET ORAL at 05:54

## 2018-01-01 RX ADMIN — INSULIN LISPRO 2 UNITS: 100 INJECTION, SOLUTION INTRAVENOUS; SUBCUTANEOUS at 22:03

## 2018-01-01 RX ADMIN — NORTRIPTYLINE HYDROCHLORIDE 20 MG: 10 CAPSULE ORAL at 03:19

## 2018-01-01 RX ADMIN — QUETIAPINE FUMARATE 12.5 MG: 25 TABLET ORAL at 09:59

## 2018-01-01 RX ADMIN — QUETIAPINE FUMARATE 25 MG: 25 TABLET ORAL at 08:39

## 2018-01-01 RX ADMIN — QUETIAPINE FUMARATE 12.5 MG: 25 TABLET ORAL at 21:15

## 2018-01-01 RX ADMIN — QUETIAPINE FUMARATE 25 MG: 25 TABLET ORAL at 07:22

## 2018-01-01 RX ADMIN — Medication 8.6 MG: at 21:22

## 2018-01-01 RX ADMIN — MICONAZOLE NITRATE: 20 CREAM TOPICAL at 10:58

## 2018-01-01 RX ADMIN — HALOPERIDOL 1 MG: 2 TABLET ORAL at 08:27

## 2018-01-01 RX ADMIN — METHYLPREDNISOLONE SODIUM SUCCINATE 125 MG: 125 INJECTION, POWDER, FOR SOLUTION INTRAMUSCULAR; INTRAVENOUS at 22:01

## 2018-01-01 RX ADMIN — Medication 8.6 MG: at 21:16

## 2018-01-01 RX ADMIN — BISACODYL 5 MG: 5 TABLET, COATED ORAL at 15:19

## 2018-01-01 RX ADMIN — CEPHALEXIN 500 MG: 250 CAPSULE ORAL at 22:06

## 2018-01-01 RX ADMIN — SITAGLIPTIN 100 MG: 100 TABLET, FILM COATED ORAL at 10:46

## 2018-01-01 RX ADMIN — GABAPENTIN 800 MG: 400 CAPSULE ORAL at 14:18

## 2018-01-01 RX ADMIN — LORAZEPAM 0.5 MG: 0.5 TABLET ORAL at 23:47

## 2018-01-01 RX ADMIN — INSULIN LISPRO 2 UNITS: 100 INJECTION, SOLUTION INTRAVENOUS; SUBCUTANEOUS at 16:39

## 2018-01-01 RX ADMIN — VITAMIN D, TAB 1000IU (100/BT) 2000 UNITS: 25 TAB at 08:50

## 2018-01-01 RX ADMIN — Medication 10 ML: at 00:00

## 2018-01-01 RX ADMIN — MICONAZOLE NITRATE: 20 CREAM TOPICAL at 18:39

## 2018-01-01 RX ADMIN — GABAPENTIN 800 MG: 400 CAPSULE ORAL at 13:32

## 2018-01-01 RX ADMIN — CEPHALEXIN 500 MG: 250 CAPSULE ORAL at 15:43

## 2018-01-01 RX ADMIN — HALOPERIDOL 1 MG: 2 TABLET ORAL at 09:11

## 2018-01-01 RX ADMIN — MICONAZOLE NITRATE: 20 CREAM TOPICAL at 08:59

## 2018-01-01 RX ADMIN — GABAPENTIN 800 MG: 400 CAPSULE ORAL at 16:33

## 2018-01-01 RX ADMIN — CEPHALEXIN 500 MG: 250 CAPSULE ORAL at 22:22

## 2018-01-01 RX ADMIN — Medication 8.6 MG: at 22:03

## 2018-01-01 RX ADMIN — MICONAZOLE NITRATE: 20 CREAM TOPICAL at 09:39

## 2018-01-01 RX ADMIN — INSULIN LISPRO 1 UNITS: 100 INJECTION, SOLUTION INTRAVENOUS; SUBCUTANEOUS at 21:59

## 2018-01-01 RX ADMIN — WATER 5 MG: 1 INJECTION INTRAMUSCULAR; INTRAVENOUS; SUBCUTANEOUS at 16:27

## 2018-01-01 RX ADMIN — MICONAZOLE NITRATE: 20 CREAM TOPICAL at 09:00

## 2018-01-01 RX ADMIN — MICONAZOLE NITRATE: 20 CREAM TOPICAL at 18:00

## 2018-01-01 RX ADMIN — HALOPERIDOL 1 MG: 2 TABLET ORAL at 15:28

## 2018-01-01 RX ADMIN — GALANTAMINE HYDROBROMIDE 8 MG: 4 TABLET, FILM COATED ORAL at 11:18

## 2018-01-01 RX ADMIN — GABAPENTIN 1200 MG: 400 CAPSULE ORAL at 21:25

## 2018-01-01 RX ADMIN — GABAPENTIN 1200 MG: 400 CAPSULE ORAL at 23:25

## 2018-01-01 RX ADMIN — HALOPERIDOL 1 MG: 2 TABLET ORAL at 21:10

## 2018-01-01 RX ADMIN — INSULIN LISPRO 2 UNITS: 100 INJECTION, SOLUTION INTRAVENOUS; SUBCUTANEOUS at 12:10

## 2018-01-01 RX ADMIN — GABAPENTIN 800 MG: 400 CAPSULE ORAL at 15:00

## 2018-01-01 RX ADMIN — ALBUTEROL SULFATE 1 DOSE: 2.5 SOLUTION RESPIRATORY (INHALATION) at 20:04

## 2018-01-01 RX ADMIN — GABAPENTIN 1200 MG: 400 CAPSULE ORAL at 21:59

## 2018-01-01 RX ADMIN — DOCUSATE SODIUM 100 MG: 100 CAPSULE, LIQUID FILLED ORAL at 18:00

## 2018-01-01 RX ADMIN — MICONAZOLE NITRATE: 20 CREAM TOPICAL at 17:17

## 2018-01-01 RX ADMIN — GABAPENTIN 800 MG: 400 CAPSULE ORAL at 14:49

## 2018-01-01 RX ADMIN — MICONAZOLE NITRATE: 20 CREAM TOPICAL at 11:49

## 2018-01-01 RX ADMIN — PROPRANOLOL HYDROCHLORIDE 20 MG: 20 TABLET ORAL at 23:36

## 2018-01-01 RX ADMIN — DOCUSATE SODIUM 100 MG: 100 CAPSULE, LIQUID FILLED ORAL at 18:16

## 2018-01-01 RX ADMIN — CEFTRIAXONE 1 G: 1 INJECTION, POWDER, FOR SOLUTION INTRAMUSCULAR; INTRAVENOUS at 20:04

## 2018-01-01 RX ADMIN — QUETIAPINE FUMARATE 25 MG: 25 TABLET ORAL at 20:54

## 2018-01-01 RX ADMIN — HALOPERIDOL 1 MG: 2 TABLET ORAL at 21:43

## 2018-01-01 RX ADMIN — HALOPERIDOL 1 MG: 2 TABLET ORAL at 16:53

## 2018-01-01 RX ADMIN — HALOPERIDOL 1 MG: 2 TABLET ORAL at 21:28

## 2018-01-01 RX ADMIN — INSULIN LISPRO 4 UNITS: 100 INJECTION, SOLUTION INTRAVENOUS; SUBCUTANEOUS at 23:25

## 2018-01-01 RX ADMIN — VITAMIN D, TAB 1000IU (100/BT) 2000 UNITS: 25 TAB at 08:57

## 2018-01-01 RX ADMIN — PROPRANOLOL HYDROCHLORIDE 20 MG: 20 TABLET ORAL at 21:22

## 2018-01-01 RX ADMIN — HALOPERIDOL 1 MG: 2 TABLET ORAL at 23:44

## 2018-01-01 RX ADMIN — BISACODYL 5 MG: 5 TABLET, COATED ORAL at 08:50

## 2018-01-01 RX ADMIN — LORAZEPAM 0.5 MG: 0.5 TABLET ORAL at 00:29

## 2018-01-01 RX ADMIN — DOCUSATE SODIUM 100 MG: 100 CAPSULE, LIQUID FILLED ORAL at 09:45

## 2018-01-01 RX ADMIN — QUETIAPINE FUMARATE 12.5 MG: 25 TABLET ORAL at 17:54

## 2018-01-01 RX ADMIN — QUETIAPINE FUMARATE 12.5 MG: 25 TABLET ORAL at 08:58

## 2018-01-01 RX ADMIN — GABAPENTIN 800 MG: 400 CAPSULE ORAL at 13:35

## 2018-01-01 RX ADMIN — LORAZEPAM 0.5 MG: 0.5 TABLET ORAL at 23:33

## 2018-01-01 RX ADMIN — Medication 8.6 MG: at 23:37

## 2018-01-01 RX ADMIN — LORAZEPAM 0.5 MG: 0.5 TABLET ORAL at 17:12

## 2018-01-01 RX ADMIN — COLLAGENASE SANTYL: 250 OINTMENT TOPICAL at 09:51

## 2018-01-01 RX ADMIN — GABAPENTIN 800 MG: 400 CAPSULE ORAL at 14:24

## 2018-01-01 RX ADMIN — PROPRANOLOL HYDROCHLORIDE 20 MG: 20 TABLET ORAL at 21:16

## 2018-01-01 RX ADMIN — MICONAZOLE NITRATE: 20 CREAM TOPICAL at 18:16

## 2018-01-01 RX ADMIN — MICONAZOLE NITRATE: 20 CREAM TOPICAL at 17:51

## 2018-01-01 RX ADMIN — HALOPERIDOL 1 MG: 2 TABLET ORAL at 21:16

## 2018-01-01 RX ADMIN — VITAMIN D, TAB 1000IU (100/BT) 2000 UNITS: 25 TAB at 10:46

## 2018-01-01 RX ADMIN — HALOPERIDOL 1 MG: 2 TABLET ORAL at 22:04

## 2018-01-01 RX ADMIN — HALOPERIDOL 1 MG: 2 TABLET ORAL at 10:13

## 2018-01-01 RX ADMIN — SITAGLIPTIN 100 MG: 100 TABLET, FILM COATED ORAL at 08:51

## 2018-01-01 RX ADMIN — ACETAMINOPHEN 650 MG: 325 TABLET ORAL at 05:15

## 2018-01-01 RX ADMIN — ACETAMINOPHEN 650 MG: 325 TABLET ORAL at 00:29

## 2018-01-01 RX ADMIN — GABAPENTIN 800 MG: 400 CAPSULE ORAL at 12:11

## 2018-01-01 RX ADMIN — HALOPERIDOL 1 MG: 2 TABLET ORAL at 18:15

## 2018-01-01 RX ADMIN — VITAMIN D, TAB 1000IU (100/BT) 2000 UNITS: 25 TAB at 09:58

## 2018-01-01 RX ADMIN — VITAMIN D, TAB 1000IU (100/BT) 2000 UNITS: 25 TAB at 08:39

## 2018-01-01 RX ADMIN — QUETIAPINE FUMARATE 12.5 MG: 25 TABLET ORAL at 22:28

## 2018-01-01 RX ADMIN — CEPHALEXIN 500 MG: 250 CAPSULE ORAL at 21:59

## 2018-01-01 RX ADMIN — HALOPERIDOL 1 MG: 2 TABLET ORAL at 08:14

## 2018-01-01 RX ADMIN — Medication 10 ML: at 06:00

## 2018-01-01 RX ADMIN — MICONAZOLE NITRATE: 20 CREAM TOPICAL at 16:15

## 2018-01-01 RX ADMIN — GABAPENTIN 1200 MG: 400 CAPSULE ORAL at 22:06

## 2018-01-01 RX ADMIN — Medication 10 ML: at 14:26

## 2018-01-01 RX ADMIN — DOCUSATE SODIUM 100 MG: 100 CAPSULE, LIQUID FILLED ORAL at 09:11

## 2018-01-01 RX ADMIN — GABAPENTIN 1200 MG: 400 CAPSULE ORAL at 03:19

## 2018-01-01 RX ADMIN — MICONAZOLE NITRATE: 20 CREAM TOPICAL at 18:17

## 2018-01-01 RX ADMIN — GABAPENTIN 800 MG: 400 CAPSULE ORAL at 13:37

## 2018-01-01 RX ADMIN — GABAPENTIN 1200 MG: 400 CAPSULE ORAL at 21:16

## 2018-01-01 RX ADMIN — HYDRALAZINE HYDROCHLORIDE 10 MG: 20 INJECTION INTRAMUSCULAR; INTRAVENOUS at 08:52

## 2018-01-01 RX ADMIN — PROPRANOLOL HYDROCHLORIDE 20 MG: 20 TABLET ORAL at 21:44

## 2018-01-01 RX ADMIN — DOCUSATE SODIUM 100 MG: 100 CAPSULE, LIQUID FILLED ORAL at 18:15

## 2018-01-01 RX ADMIN — SITAGLIPTIN 100 MG: 100 TABLET, FILM COATED ORAL at 10:19

## 2018-01-01 RX ADMIN — SITAGLIPTIN 25 MG: 25 TABLET, FILM COATED ORAL at 08:41

## 2018-01-01 RX ADMIN — PROPRANOLOL HYDROCHLORIDE 20 MG: 20 TABLET ORAL at 21:45

## 2018-01-01 RX ADMIN — Medication 10 ML: at 21:42

## 2018-01-01 RX ADMIN — VITAMIN D, TAB 1000IU (100/BT) 2000 UNITS: 25 TAB at 09:11

## 2018-01-01 RX ADMIN — MICONAZOLE NITRATE: 20 CREAM TOPICAL at 18:32

## 2018-01-01 RX ADMIN — HALOPERIDOL LACTATE 5 MG: 5 INJECTION, SOLUTION INTRAMUSCULAR at 19:46

## 2018-01-01 RX ADMIN — Medication 8.6 MG: at 21:45

## 2018-01-01 RX ADMIN — DOCUSATE SODIUM 100 MG: 100 CAPSULE, LIQUID FILLED ORAL at 16:14

## 2018-01-01 RX ADMIN — POLYETHYLENE GLYCOL 3350 17 G: 17 POWDER, FOR SOLUTION ORAL at 09:44

## 2018-01-01 RX ADMIN — CEPHALEXIN 500 MG: 250 CAPSULE ORAL at 08:39

## 2018-01-01 RX ADMIN — QUETIAPINE FUMARATE 12.5 MG: 25 TABLET ORAL at 23:51

## 2018-01-01 RX ADMIN — MICONAZOLE NITRATE: 20 CREAM TOPICAL at 08:42

## 2018-01-01 RX ADMIN — HALOPERIDOL 1 MG: 2 TABLET ORAL at 16:14

## 2018-01-01 RX ADMIN — AMIODARONE HYDROCHLORIDE 200 MG: 200 TABLET ORAL at 06:34

## 2018-01-01 RX ADMIN — HALOPERIDOL 1 MG: 2 TABLET ORAL at 22:32

## 2018-01-01 RX ADMIN — ALBUTEROL SULFATE 1 DOSE: 2.5 SOLUTION RESPIRATORY (INHALATION) at 22:05

## 2018-01-01 RX ADMIN — HALOPERIDOL 1 MG: 2 TABLET ORAL at 15:02

## 2018-06-22 PROBLEM — G25.0 ESSENTIAL TREMOR: Status: ACTIVE | Noted: 2018-01-01

## 2018-06-22 PROBLEM — R09.02 HYPOXIA: Status: ACTIVE | Noted: 2018-01-01

## 2018-06-22 PROBLEM — E11.9 TYPE 2 DIABETES MELLITUS, WITHOUT LONG-TERM CURRENT USE OF INSULIN (HCC): Status: ACTIVE | Noted: 2018-01-01

## 2018-06-22 PROBLEM — R06.02 SOB (SHORTNESS OF BREATH): Status: ACTIVE | Noted: 2018-01-01

## 2018-06-22 PROBLEM — F03.90 DEMENTIA (HCC): Status: ACTIVE | Noted: 2018-01-01

## 2018-06-22 NOTE — IP AVS SNAPSHOT
303 [facility] 104 1007 [facility] 
[phone] Patient: Dania Stewart MRN: [id number] :10/17/1932 About your hospitalization You were admitted on:  2018 You last received care in the:  [facility]  MED SURG 2 You were discharged on:  2018 Why you were hospitalized Your primary diagnosis was:  Not on File Your diagnoses also included:  Sob (Shortness Of Breath), Hypoxia, Dementia, Essential Tremor, Type 2 Diabetes Mellitus, Without Long-Term Current Use Of Insulin (Hcc) Follow-up Information Follow up With Details Comments Contact Info Ravi Olmstead, DO   [address] [facility] [address] [facility] 7 [address] [phone] Discharge Orders None A check kelsy indicates which time of day the medication should be taken. My Medications START taking these medications Instructions Each Dose to Equal  
 Morning Noon Evening Bedtime  
 albuterol 1.25 mg/3 mL Nebu Commonly known as:  Chales Parents Your last dose was: Your next dose is:    
   
   
 3 mL by Nebulization route every six (6) hours as needed. Wheezing or shortness of breath 1.25 mg  
    
   
   
   
  
 doxycycline 100 mg tablet Commonly known as:  ADOXA Your last dose was: Your next dose is: Take 1 Tab by mouth two (2) times a day for 5 days. 100 mg CONTINUE taking these medications Instructions Each Dose to Equal  
 Morning Noon Evening Bedtime  
 amiodarone 200 mg tablet Commonly known as:  CORDARONE Your last dose was: Your next dose is: Take 200 mg by mouth every other day. 200 mg Calcium-Cholecalciferol (D3) 600 mg(1,500mg) -400 unit Cap Your last dose was: Your next dose is: Take 1 Cap by mouth daily. 1 Cap  
    
   
   
   
  
 donepezil 5 mg tablet Commonly known as:  ARICEPT Your last dose was: Your next dose is: Take 5 mg by mouth nightly. 5 mg DULCOLAX (BISACODYL) 5 mg EC tablet Generic drug:  bisacodyl Your last dose was: Your next dose is: Take 5 mg by mouth daily as needed for Constipation. 5 mg  
    
   
   
   
  
 * gabapentin 400 mg capsule Commonly known as:  NEURONTIN Your last dose was: Your next dose is: Take 1,200 mg by mouth nightly. 1200 mg  
    
   
   
   
  
 * gabapentin 400 mg capsule Commonly known as:  NEURONTIN Your last dose was: Your next dose is: Take 800 mg by mouth daily (after lunch). 800 mg  
    
   
   
   
  
 galantamine 8 mg tablet Commonly known as:  Nancylee Settle Your last dose was: Your next dose is: Take 8 mg by mouth daily. 8 mg LINZESS 290 mcg Cap capsule Generic drug:  linaclotide Your last dose was: Your next dose is: Take 290 mcg by mouth daily as needed. 290 mcg LORazepam 0.5 mg tablet Commonly known as:  ATIVAN Your last dose was: Your next dose is: Take 0.5 mg by mouth nightly as needed. 0.5 mg  
    
   
   
   
  
 nortriptyline 10 mg capsule Commonly known as:  PAMELOR Your last dose was: Your next dose is: Take 20 mg by mouth nightly. 20 mg  
    
   
   
   
  
 OCUVITE PRESERVISION PO Your last dose was: Your next dose is: Take 1 Cap by mouth two (2) times a day. 1 Cap PriLOSEC 20 mg capsule Generic drug:  omeprazole Your last dose was: Your next dose is: Take 20 mg by mouth daily as needed. 20 mg  
    
   
   
   
  
 propranolol 20 mg tablet Commonly known as:  INDERAL Your last dose was: Your next dose is: Take 20 mg by mouth nightly. 20 mg  
    
   
   
   
  
 SYSTANE (PROPYLENE GLYCOL) 0.4-0.3 % Drop Generic drug:  peg 400-propylene glycol Your last dose was: Your next dose is:    
   
   
 Apply 1 Drop to eye as needed. 1 Drop VITAMIN D3 2,000 unit Tab Generic drug:  cholecalciferol (vitamin D3) Your last dose was: Your next dose is: Take 2,000 Units by mouth daily. 2000 Units * Notice: This list has 2 medication(s) that are the same as other medications prescribed for you. Read the directions carefully, and ask your doctor or other care provider to review them with you. STOP taking these medications   
 glipiZIDE-metFORMIN 2.5-500 mg per tablet Commonly known as:  METAGLIP Where to Get Your Medications Information on where to get these meds will be given to you by the nurse or doctor. ! Ask your nurse or doctor about these medications  
  albuterol 1.25 mg/3 mL Nebu  
 doxycycline 100 mg tablet Discharge Instructions HOSPITALIST DISCHARGE INSTRUCTIONS 
NAME: Machelle España :  10/17/1932 MRN:  [id number] Date/Time:  2018 10:04 AM 
 
ADMIT DATE: 2018 DISCHARGE DATE: 2018 ADMITTING DIAGNOSIS: 
Delirium on dementia Bronchitis DISCHARGE DIAGNOSIS: 
As above MEDICATIONS: 
  
· It is important that you take the medication exactly as they are prescribed. · Keep your medication in the bottles provided by the pharmacist and keep a list of the medication names, dosages, and times to be taken in your wallet. · Do not take other medications without consulting your doctor. Pain Management: per above medications What to do at [facility] Recommended diet:  Resume previous diet Recommended activity: Activity as tolerated If you experience any of the following symptoms then please call your primary care physician or return to the emergency room if you cannot get hold of your doctor: 
Fever, chills, nausea, vomiting, diarrhea, change in mentation, falling, bleeding, shortness of breath, chest pain Follow Up: 
Dr. Baudilio Castillo, DO  you are to call and set up an appointment to see them in 3-4 days Information obtained by : 
I understand that if any problems occur once I am at home I am to contact my physician. I understand and acknowledge receipt of the instructions indicated above. Physician's or R.N.'s Signature                                                                  Date/Time Patient or Representative Signature                                                          Date/Time Maven7 Announcement We are excited to announce that we are making your provider's discharge notes available to you in Maven7. You will see these notes when they are completed and signed by the physician that discharged you from your recent hospital stay. If you have any questions or concerns about any information you see in Maven7, please call the Health Information Department where you were seen or reach out to your Primary Care Provider for more information about your plan of care. Introducing [facility]! Dear Pramod Arriaga: Thank you for requesting a Maven7 account. Our records indicate that you already have an active Maven7 account. You can access your account anytime at [email or web address] Did you know that you can access your hospital and ER discharge instructions at any time in Maven7? You can also review all of your test results from your hospital stay or ER visit. Additional Information If you have questions, please visit the Frequently Asked Questions section of the MyChart website at [email or web address] Remember, Plumbrt is NOT to be used for urgent needs. For medical emergencies, dial 911. Now available from your iPhone and Android! Introducing Ishaan Howe As a [employer] [facility] patient, I wanted to make you aware of our electronic visit tool called Ishaan Howe. [employer]OMIC (formerly [employer]) allows you to connect within minutes with a medical provider 24 hours a day, seven days a week via a mobile device or tablet or logging into a secure website from your computer. You can access Ishaan Howe from anywhere in the [place]. A virtual visit might be right for you when you have a simple condition and feel like you just dont want to get out of bed, or cant get away from work for an appointment, when your regular [facility] provider is not available (evenings, weekends or holidays), or when youre out of town and need minor care. Electronic visits cost only $49 and if the [employer]) provider determines a prescription is needed to treat your condition, one can be electronically transmitted to a nearby pharmacy*. Please take a moment to enroll today if you have not already done so. The enrollment process is free and takes just a few minutes. To enroll, please download the [employer] momo to your tablet or phone, or visit [email or web address] to enroll on your computer. And, as an [address] [place] [address] patient with a [employer] account, the results of your visits will be scanned into your electronic medical record and your primary care provider will be able to view the scanned results. We urge you to continue to see your regular [facility] provider for your ongoing medical care.   And while your primary care provider may not be the one available when you seek a Ishaan Rodastitafin virtual visit, the peace of mind you get from getting a real diagnosis real time can be priceless. For more information on Ishaan Porrasfin, view our Frequently Asked Questions (FAQs) at [email or web address]. Sincerely, 
 
Halina Gauthier MD 
Chief Medical Officer Doe Benson *:  certain medications cannot be prescribed via Ishaan [employer] Unresulted tests-please follow up with your PCP on these results Procedure/Test Authorizing Provider CBC WITH AUTOMATED DIFF Jomar Burundian, DO  
 CBC WITH AUTOMATED DIFF Yaneth Ayoub MD  
 CULTURE, URINE Sergio Chin MD  
 EKG, 12 LEAD, INITIAL Eagle Greek, DO  
 EKG, 12 LEAD, INITIAL Yaneth Ayoub MD  
 HEMOGLOBIN A1C WITH EAG Sergio Chin MD  
 [facility]  
 METABOLIC PANEL, COMPREHENSIVE Eagle Burundian, DO  
 METABOLIC PANEL, COMPREHENSIVE Yaneth Ayoub MD  
 NT-PRO BNP [facility], [facility]  
 PROTHROMBIN TIME + INR [facility], DO  
 SAMPLES BEING HELD [facility]  
 TROPONIN I [facility], DO  
 URINALYSIS W/MICROSCOPIC [facility], DO  
 URINE CULTURE HOLD SAMPLE [facility]  
 VENOUS BLOOD GAS [facility], DO  
 XR CHEST PORT [facility], [facility]  
  
Providers Seen During Your Hospitalization Provider Specialty Primary office phone Jomar Ochoa,  Emergency Medicine [phone] Yaneth Ayoub MD Internal Medicine [phone] Segrio Chin MD Internal Medicine [phone] Your Primary Care Physician (PCP) Primary Care Physician Office Phone Office Fax Lauri Jackson [phone] You are allergic to the following Allergen Reactions Prednisone Other (comments) \"I have the flu; I'm not going to take it\" Recent Documentation Height Weight BMI OB Status Smoking Status 1.651 m 59 kg 21.63 kg/m2 Postmenopausal Former Smoker Emergency Contacts Name Discharge Info Relation Home Work Mobile Aleksandra CAREGIVER [3] Other Relative [6] [phone] Patient Belongings The following personal items are in your possession at time of discharge: 
  Dental Appliances: Uppers  Visual Aid: Glasses      Home Medications: None   Jewelry: None  Clothing: At bedside Please provide this summary of care documentation to your next provider. Signatures-by signing, you are acknowledging that this After Visit Summary has been reviewed with you and you have received a copy. Patient Signature:  ____________________________________________________________ Date:  ____________________________________________________________  
  
[facility] Provider Signature:  ____________________________________________________________ Date:  ____________________________________________________________

## 2018-06-22 NOTE — IP AVS SNAPSHOT
[address] 
[phone] Patient: Ying Brooks MRN: [id number] :10/17/1932 A check kelsy indicates which time of day the medication should be taken. My Medications START taking these medications Instructions Each Dose to Equal  
 Morning Noon Evening Bedtime  
 albuterol 1.25 mg/3 mL Nebu Commonly known as:  Marshal Nazario Your last dose was: Your next dose is:    
   
   
 3 mL by Nebulization route every six (6) hours as needed. Wheezing or shortness of breath 1.25 mg  
    
   
   
   
  
 doxycycline 100 mg tablet Commonly known as:  ADOXA Your last dose was: Your next dose is: Take 1 Tab by mouth two (2) times a day for 5 days. 100 mg CONTINUE taking these medications Instructions Each Dose to Equal  
 Morning Noon Evening Bedtime  
 amiodarone 200 mg tablet Commonly known as:  CORDARONE Your last dose was: Your next dose is: Take 200 mg by mouth every other day. 200 mg Calcium-Cholecalciferol (D3) 600 mg(1,500mg) -400 unit Cap Your last dose was: Your next dose is: Take 1 Cap by mouth daily. 1 Cap  
    
   
   
   
  
 donepezil 5 mg tablet Commonly known as:  ARICEPT Your last dose was: Your next dose is: Take 5 mg by mouth nightly. 5 mg DULCOLAX (BISACODYL) 5 mg EC tablet Generic drug:  bisacodyl Your last dose was: Your next dose is: Take 5 mg by mouth daily as needed for Constipation. 5 mg  
    
   
   
   
  
 * gabapentin 400 mg capsule Commonly known as:  NEURONTIN Your last dose was: Your next dose is: Take 1,200 mg by mouth nightly. 1200 mg  
    
   
   
   
  
 * gabapentin 400 mg capsule Commonly known as:  NEURONTIN Your last dose was: Your next dose is: Take 800 mg by mouth daily (after lunch). 800 mg  
    
   
   
   
  
 galantamine 8 mg tablet Commonly known as:  Chyna Hashimoto Your last dose was: Your next dose is: Take 8 mg by mouth daily. 8 mg LINZESS 290 mcg Cap capsule Generic drug:  linaclotide Your last dose was: Your next dose is: Take 290 mcg by mouth daily as needed. 290 mcg LORazepam 0.5 mg tablet Commonly known as:  ATIVAN Your last dose was: Your next dose is: Take 0.5 mg by mouth nightly as needed. 0.5 mg  
    
   
   
   
  
 nortriptyline 10 mg capsule Commonly known as:  PAMELOR Your last dose was: Your next dose is: Take 20 mg by mouth nightly. 20 mg  
    
   
   
   
  
 OCUVITE PRESERVISION PO Your last dose was: Your next dose is: Take 1 Cap by mouth two (2) times a day. 1 Cap PriLOSEC 20 mg capsule Generic drug:  omeprazole Your last dose was: Your next dose is: Take 20 mg by mouth daily as needed. 20 mg  
    
   
   
   
  
 propranolol 20 mg tablet Commonly known as:  INDERAL Your last dose was: Your next dose is: Take 20 mg by mouth nightly. 20 mg  
    
   
   
   
  
 SYSTANE (PROPYLENE GLYCOL) 0.4-0.3 % Drop Generic drug:  peg 400-propylene glycol Your last dose was: Your next dose is:    
   
   
 Apply 1 Drop to eye as needed. 1 Drop VITAMIN D3 2,000 unit Tab Generic drug:  cholecalciferol (vitamin D3) Your last dose was: Your next dose is: Take 2,000 Units by mouth daily. 2000 Units * Notice:   This list has 2 medication(s) that are the same as other medications prescribed for you. Read the directions carefully, and ask your doctor or other care provider to review them with you. STOP taking these medications   
 glipiZIDE-metFORMIN 2.5-500 mg per tablet Commonly known as:  METAGLIP Where to Get Your Medications Information on where to get these meds will be given to you by the nurse or doctor. ! Ask your nurse or doctor about these medications  
  albuterol 1.25 mg/3 mL Nebu  
 doxycycline 100 mg tablet

## 2018-06-22 NOTE — ED PROVIDER NOTES
HPI Comments: 80 y.o. female with past medical history significant for diabetes, Parkinson's disease, and dementia who presents from an assisted living facility via EMS with chief complaint of shortness of breath. Per EMS, the pt is blind and was very anxious during transport. Per EMS, the pt had to be convinced to come to the ED for treatment. Pt reports she has had shortness of breath for 3 days with an associated productive cough and rhinorrhea. Pt states that she is \"fine\" and \"wants to go home. \" Pt denies any fever or chills. Old Chart Review: Pt was last seen in the ED on 12/17/17 for shortness of breath. Full history, physical exam, and ROS unable to be obtained due to:  dementia. There are no other acute medical concerns at this time. Social hx - Tobacco use: former smoker of 1 pack/day for 60 years, Alcohol Use: none    PCP: Kelsey Randhawa DO    Note written by Roxana Phillips, as dictated by Nicci Tolbert DO 6:49 PM.        The history is provided by the patient and the EMS personnel. The history is limited by the condition of the patient. No  was used. Past Medical History:   Diagnosis Date    Chronic pain     Peripheral neuropathy    Dementia     Diabetes ([address]     Parkinson's disease ([address]        Past Surgical History:   Procedure Laterality Date    ABDOMEN SURGERY PROC UNLISTED  6226    Umbilical Hernia repair    CARDIAC SURG PROCEDURE UNLIST  4/2014    valve replacement         Family History:   Problem Relation Age of Onset    Diabetes Mother     Parkinsonism Father     Diabetes Brother        Social History     Social History    Marital status:      Spouse name: N/A    Number of children: N/A    Years of education: N/A     Occupational History    Not on file.      Social History Main Topics    Smoking status: Former Smoker     Packs/day: 1.00     Years: 60.00     Quit date: 3/30/2013    Smokeless tobacco: Never Used   [facility] Alcohol use No    Drug use: No    Sexual activity: Not on file     Other Topics Concern    Not on file     Social History Narrative         ALLERGIES: Prednisone    Review of Systems   Unable to perform ROS: Dementia       Vitals:    06/22/18 1834   BP: (!) 108/94   Pulse: 67   Resp: 30   SpO2: 96%   Weight: 59 kg (130 lb)   Height: 5' 5\" (1.651 m)            Physical Exam   Constitutional: She appears distressed (agitated). Frail, elderly   HENT:   Head: Normocephalic and atraumatic. Nose: Nose normal.   Mouth/Throat: Oropharynx is clear and moist. No oropharyngeal exudate. Eyes: Conjunctivae are normal. Right eye exhibits no discharge. Left eye exhibits no discharge. No scleral icterus. Neck: No tracheal deviation present. Cardiovascular: Normal rate, regular rhythm, normal heart sounds and intact distal pulses. Pulmonary/Chest: She is in respiratory distress. She has wheezes (scattered). Abdominal: Soft. She exhibits no distension. There is no tenderness. There is no rebound. Musculoskeletal: She exhibits edema (trace). Neurological: She is alert. Skin: Skin is warm and dry. She is not diaphoretic. Psychiatric:   Directable. Uncooperative at times. Demented. Nursing note and vitals reviewed. [facility]      ED Course       Procedures    PROGRESS NOTE:  7:39 PM  Evaluated pt's chest X-ray. 9:41 PM - placed a 20g peripheral iv R FA after numerous failed nursing attempts. Chris Morelos DO            Signed out to Dr Elsie Juarez with labs, ekg pending. Will get nebs. Needed repeat doses of haldol to handle agitation.

## 2018-06-22 NOTE — ED TRIAGE NOTES
Blind pt transported secondary to assisted living facility calling EMS for SOB. Pt states she does not want to be seen and she wants to go home. Pt appears very anxious and upset.

## 2018-06-23 NOTE — DISCHARGE INSTRUCTIONS
HOSPITALIST DISCHARGE INSTRUCTIONS  NAME: Jona Monday   :  10/17/1932   MRN:  [id number]     Date/Time:  2018 10:04 AM    ADMIT DATE: 2018     DISCHARGE DATE: 2018     ADMITTING DIAGNOSIS:  Delirium on dementia   Bronchitis     DISCHARGE DIAGNOSIS:  As above     MEDICATIONS:     · It is important that you take the medication exactly as they are prescribed. · Keep your medication in the bottles provided by the pharmacist and keep a list of the medication names, dosages, and times to be taken in your wallet. · Do not take other medications without consulting your doctor. Pain Management: per above medications    What to do at Home    Recommended diet:  Resume previous diet    Recommended activity: Activity as tolerated    If you experience any of the following symptoms then please call your primary care physician or return to the emergency room if you cannot get hold of your doctor:  Fever, chills, nausea, vomiting, diarrhea, change in mentation, falling, bleeding, shortness of breath, chest pain     Follow Up:  Dr. Christine Ly, DO  you are to call and set up an appointment to see them in 3-4 days       Information obtained by :  I understand that if any problems occur once I am at home I am to contact my physician. I understand and acknowledge receipt of the instructions indicated above.                                                                                                                                            Physician's or R.N.'s Signature                                                                  Date/Time                                                                                                                                              Patient or Representative Signature                                                          Date/Time

## 2018-06-23 NOTE — PROGRESS NOTES
physical Therapy EVALUATION/DISCHARGE  Patient: Ying Brooks (31 y.o. female)  Date: 6/23/2018  Primary Diagnosis: SOB (shortness of breath)        Precautions: fall, vision impaired      Based on the objective data described above, the patient presents with SBA with ambulation using a rolling walker has dementia at her baseline and supervison with all functional mobility. Patient will be discharge back to [facility] assisted living facilty where she lives at noon. Reviewed all safety precaution and home exercise program with the patient, verbalized understanding, clear to go home per Physical Therapy perspective. Skilled physical therapy is not indicated at this time. Documentation for home O2:     ROOM AIR    AT REST   O2 SATS  98% HR  80    WITH ACTIVITY 02 SATS  96% HR  85   ROOM AIR PATIENT LEFT COMFORTABLY  SITTING/SUPINE O2 SATS  96% HR  85          PLAN :  Discharge Recommendations: None  Further Equipment Recommendations for Discharge: already has DME's     SUBJECTIVE:   Patient stated I want to go home.     OBJECTIVE DATA SUMMARY:   HISTORY:    Past Medical History:   Diagnosis Date    Chronic pain     Peripheral neuropathy    Dementia     [facility]     Parkinson's disease ([facility]      Past Surgical History:   Procedure Laterality Date    ABDOMEN SURGERY PROC UNLISTED  3295    Umbilical Hernia repair    CARDIAC SURG PROCEDURE UNLIST  4/2014    valve replacement     Prior Level of Function/Home Situation: per chart review patient lives at [facility] living facility.   Personal factors and/or comorbidities impacting plan of care:     Home Situation  Home Environment: Private residence  One/Two Story Residence: One story  Living Alone: Yes  Support Systems: Friends \ neighbors  Patient Expects to be Discharged to[de-identified] Private residence  Current DME Used/Available at Home: None    EXAMINATION/PRESENTATION/DECISION MAKING:   Critical Behavior:  Neurologic State: Alert  Orientation Level: Oriented to person, Oriented to place  Cognition: Impaired decision making, Poor safety awareness, Impulsive     Hearing: Auditory  Auditory Impairment: Hard of hearing, bilateral, Hearing aid(s)    Range Of Motion:  AROM: Within functional limits           PROM: Within functional limits           Strength:    Strength: Within functional limits                    Tone & Sensation:                                  Coordination:  Coordination: Within functional limits  Vision:      Functional Mobility:  Bed Mobility:  Rolling: Supervision  Supine to Sit: Supervision  Sit to Supine: Supervision  Scooting: Supervision  Transfers:  Sit to Stand: Supervision  Stand to Sit: Supervision  Stand Pivot Transfers: Supervision     Bed to Chair: Supervision              Balance:   Sitting: Intact  Standing: Intact; With support  Ambulation/Gait Training:  Distance (ft): 100 Feet (ft)  Assistive Device: Walker, rolling;Gait belt  Ambulation - Level of Assistance: Stand-by assistance     Gait Description (WDL): Within defined limits  Gait Abnormalities: Path deviations        Base of Support: Widened     Speed/Noemy: Fluctuations             Therapeutic Exercises:    Instructed patient to continue active range of motion exercise on both legs while up on chair or on bed. Functional Measure:  Barthel Index:    Bathin  Bladder: 10  Bowels: 10  Groomin  Dressin  Feeding: 10  Mobility: 10  Stairs: 0 (lives in [facility] living [facility])  Toilet Use: 10  Transfer (Bed to Chair and Back): 10  Total: 75       Barthel and G-code impairment scale:  Percentage of impairment CH  0% CI  1-19% CJ  20-39% CK  40-59% CL  60-79% CM  80-99% CN  100%   Barthel Score 0-100 100 99-80 79-60 59-40 20-39 1-19   0   Barthel Score 0-20 20 17-19 13-16 9-12 5-8 1-4 0      The Barthel ADL Index: Guidelines  1.  The index should be used as a record of what a patient does, not as a record of what a patient could do. 2. The main aim is to establish degree of independence from any help, physical or verbal, however minor and for whatever reason. 3. The need for supervision renders the patient not independent. 4. A patient's performance should be established using the best available evidence. Asking the patient, friends/relatives and nurses are the usual sources, but direct observation and common sense are also important. However direct testing is not needed. 5. Usually the patient's performance over the preceding 24-48 hours is important, but occasionally longer periods will be relevant. 6. Middle categories imply that the patient supplies over 50 per cent of the effort. 7. Use of aids to be independent is allowed. Delonte Keene., Barthel, D.W. (6278). Functional evaluation: the Barthel Index. [facility] (14)2. BOBBY Brandt, [place]., Aryan Anders., [place], [address] [place] [address] (1999). Measuring the change indisability after inpatient rehabilitation; comparison of the responsiveness of the Barthel Index and Functional [place] Measure. Journal of Neurology, Neurosurgery, and Psychiatry, 66(4), 917-528. Lexie Winters, N.J.A, FRANCIS Resendiz, & Sandra Medel, MAmandaA. (2004.) Assessment of post-stroke quality of life in cost-effectiveness studies: The usefulness of the Barthel Index and the EuroQoL-5D. Quality of Life Research, 13, 505-73       G codes: In compliance with CMSs Claims Based Outcome Reporting, the following G-code set was chosen for this patient based on their primary functional limitation being treated: The outcome measure chosen to determine the severity of the functional limitation was the barthel with a score of 75/100 which was correlated with the impairment scale.     ? Mobility - Walking and Moving Around:     - CURRENT STATUS: CJ - 20%-39% impaired, limited or restricted    - GOAL STATUS: CJ - 20%-39% impaired, limited or restricted    - D/C STATUS:  CJ - 20%-39% impaired, limited or restricted        Physical Therapy Evaluation Charge Determination   History Examination Presentation Decision-Making   MEDIUM  Complexity : 1-2 comorbidities / personal factors will impact the outcome/ POC  MEDIUM Complexity : 3 Standardized tests and measures addressing body structure, function, activity limitation and / or participation in recreation  MEDIUM Complexity : Evolving with changing characteristics  Other outcome measures barthel  MEDIUM      Based on the above components, the patient evaluation is determined to be of the following complexity level: MEDIUM    Pain:  Pain Scale 1: Numeric (0 - 10)  Pain Intensity 1: 0     Activity Tolerance:   Good. Please refer to the flowsheet for vital signs taken during this treatment. After treatment:   []   Patient left in no apparent distress sitting up in chair  [x]   Patient left in no apparent distress in bed  [x]   Call bell left within reach  [x]   Nursing notified  [x]   Sitter present  [x]   Bed alarm activated    COMMUNICATION/EDUCATION:   Communication/Collaboration:  [x]   Fall prevention education was provided and the patient/caregiver indicated understanding. [x]   Patient/family have participated as able and agree with findings and recommendations. []   Patient is unable to participate in plan of care at this time. Findings and recommendations were discussed with: Registered Nurse, Physician and patient    Thank you for this referral.  Dalton Jenkins, PT,WCC.    Time Calculation: 26 mins

## 2018-06-23 NOTE — PROGRESS NOTES
Full note to follow: pt seen and examined. Denies SOB, CP. Sat's on RA 94%. Mild bibasilar crackles suspect 2/2 atelectasis. CXR showing chronic interstitial pattern. Possible 2/2 amiodarone but nothing acutely noted on x-ray. No indication for hospitalization as on RA and no acute process to be treated. Productive cough reported from AL.  This was not demonstrated here, but can d/c on course of levofloxacin for acute bronchitis

## 2018-06-23 NOTE — PROGRESS NOTES
[facility]: MED RECONCILIATION    Comments/Recommendations:     Pt agitated tonight and a little altered. Located within [facility] ([phone]) to obtain medication list and was informed by the staff that it was independent living facility, and they do not maintain medication. The pt moved to this facility about 1 week ago. Sandeep Lazo ([phone]) was called next to see if they had recent medication record to use for her list.  They are unable to check tonight but stated staff (specifically Yandel Earlineestella) may be able to locate records if they are still in the facility. Pharmacy will call in the AM to check w/ them. We will talk with the pt tmrw once she is calmer since the [place] [id number] staff indicated she knows her medications    In the meantime, the PTA list was updated based on RxQuery alone. MD notified of this      Allergies: Prednisone    Prior to Admission Medications:     Medication Documentation Review Audit       Reviewed by Marguerite Brumfield. Swati Harkins (Pharmacist) on 06/22/18 at 1694         Medication Sig Documenting Provider Last Dose Status Taking? ACETAMINOPHEN (TYLENOL EXTRA STRENGTH PO) Take  by mouth. Historical Provider  Active No    albuterol (PROVENTIL VENTOLIN) 2.5 mg /3 mL (0.083 %) nebulizer solution 2.5 mg by Nebulization route four (4) times daily. Historical Provider  Active No    amiodarone (CORDARONE) 200 mg tablet Take 100 mg by mouth daily. Historical Provider  Active Yes    clotrimazole-betamethasone (LOTRISONE) topical cream Apply  to affected area two (2) times a day. Apply to affected area Gonzalo Leo MD  Active No    donepezil (ARICEPT) 5 mg tablet Take 5 mg by mouth nightly. Historical Provider  Active Yes    ergocalciferol (ERGOCALCIFEROL) 50,000 unit capsule Take 50,000 Units by mouth. Historical Provider  Active No    Flaxseed Oil oil by Does Not Apply route. Historical Provider  Active No    gabapentin (NEURONTIN) 400 mg capsule Take  by mouth.  Felix Baxter MD  Active Yes Med Note (Ashvin Shah. Fri Jun 22, 2018 10:56 PM): Prescribed to possibly take upwards of 7 caps/day (or 2800mg total daily dose)      galantamine (RAZADYNE) 8 mg tablet Take 8 mg by mouth daily. Historical Provider  Active Yes    GINKGO BILOBA (GINKOBA PO) Take  by mouth. Historical Provider  Active No    glipiZIDE-metFORMIN (METAGLIP) 2.5-500 mg per tablet Take 1.5 Tabs by mouth Before breakfast and dinner. Historical Provider  Active No    GLUCOSAMINE/CHONDRO QUEZADA A (COSAMIN DS PO) Take  by mouth. Historical Provider  Active No    linaclotide (LINZESS) 290 mcg cap capsule Take 290 mcg by mouth daily as needed. Historical Provider  Active Yes    LORazepam (ATIVAN) 0.5 mg tablet Take 0.5 mg by mouth three (3) times daily as needed. Historical Provider  Active Yes             Med Note (Ashvin Shah. Fri Jun 22, 2018 10:59 PM): Likely taking TID      mometasone-formoterol (DULERA) 200-5 mcg/actuation HFA inhaler Take 2 Puffs by inhalation two (2) times a day. Historical Provider  Active No    nortriptyline (PAMELOR) 10 mg capsule Take 20 mg by mouth nightly. Historical Provider  Active No    omega-3 fatty acids-vitamin e (FISH OIL) 1,000 mg cap Take 1 Cap by mouth. Historical Provider  Active No    omeprazole (PRILOSEC) 20 mg capsule Take 20 mg by mouth two (2) times a day. Historical Provider  Active Yes    polyethylene glycol (MIRALAX) 17 gram/dose powder Take 17 g by mouth daily. 1 tablespoon with 8 oz of water daily Laura Key MD  Active No    propranolol (INDERAL) 20 mg tablet Take 1 Tab by mouth three (3) times daily. Lois Barbour MD  Active Yes    VIT A/VIT C/VIT E/ZINC/COPPER (OCUVITE PRESERVISION PO) Take 1 Cap by mouth daily. Historical Provider  Active No                    Thank you,  Shayla Donald, Pharm. D.

## 2018-06-23 NOTE — ED NOTES
TRANSFER - OUT REPORT:    Verbal report given to Filipe Pedersen RN (name) on Daniel Elizabeth  being transferred to Med Surg (unit) for routine progression of care       Report consisted of patients Situation, Background, Assessment and   Recommendations(SBAR). Information from the following report(s) SBAR, ED Summary, Intake/Output, MAR, Recent Results, Med Rec Status and Cardiac Rhythm NSR was reviewed with the receiving nurse. Lines:   Peripheral IV 06/22/18 Right Forearm (Active)   Site Assessment Clean, dry, & intact 6/22/2018  9:48 PM   Phlebitis Assessment 0 6/22/2018  9:48 PM   Infiltration Assessment 0 6/22/2018  9:48 PM   Dressing Status Clean, dry, & intact 6/22/2018  9:48 PM   Hub Color/Line Status Pink;Flushed;Patent 6/22/2018  9:48 PM   Action Taken Wrapped 6/22/2018  9:48 PM   Alcohol Cap Used No 6/22/2018  9:48 PM        Opportunity for questions and clarification was provided.       Patient transported with:   Registered Nurse

## 2018-06-23 NOTE — ROUTINE PROCESS
Pt is agitated and has tremors in the hands. Pt refused blood to be drawn and says it has \"already been done downstairs\". Pt is smacking lips constantly. I questioned whether she was thirsty. Gave pt water. Pt still very restless and smacking lips. Will continue to monitor.

## 2018-06-23 NOTE — PROGRESS NOTES
6/23/2018 12:55 PM Per pt's RN pts granddaughter has arranged personal care for pt at [address] [facility] living.     6/23/2018  10:39 AM Discussed with pt's RN and pt's granddaughter again. Pt's granddaughter will be to [facility] in 45 minutes to an hour to transport pt back home. 6/23/2018  10:32 AM Case management consult for discharge planning and possible O2 received. Discussed with pt's attending, pt will not need O2 and planning for discharge today. Called and spoke with pt's granddaughter, JESSI([phone]). Ashwini reported she can transport pt back to [facility] within the next 30 minutes. CM called and attempted to discuss with pt's RN, pt's RN unavailable. CM will follow up.  STEFANIE CalixW

## 2018-06-23 NOTE — H&P
[place] [id number] [facility], [facility]  [phone]    Admission History and Physical      NAME:              Luke Naik   :   10/17/1932   MRN:  [id number]     PCP:  Ana Luisa Duenas, DO     Date:     2018     Chief  Complaint: SOB    History Of Presenting Illness:       Ms. Davia Felty is a 80 y.o. female who is being admitted for SOB. Ms. Davia Felty has a hx of dementia and is not a good historian. I have discussed with Dr Judd Jones for collaborative hx. She presented to our Emergency Department today complaining of SOB, moderate, continuous associated with a productive cough but its unclear if she had any fever. She usually does not use any supplemental oxygen byt she was found to be hypoxic. A chest Xray done was unremarkable. She will be closely monitored in hospital for worsening and treated for likely bronchitis as she did have a leukocytosis. Allergies   Allergen Reactions    Prednisone Other (comments)     \"I have the flu; I'm not going to take it\"       Prior to Admission medications    Medication Sig Start Date End Date Taking? Authorizing Provider   LORazepam (ATIVAN) 0.5 mg tablet Take 0.5 mg by mouth three (3) times daily as needed. Yes Historical Provider   linaclotide (LINZESS) 290 mcg cap capsule Take 290 mcg by mouth daily as needed. Yes Historical Provider   galantamine (RAZADYNE) 8 mg tablet Take 8 mg by mouth daily. Yes Historical Provider   donepezil (ARICEPT) 5 mg tablet Take 5 mg by mouth nightly. Yes Historical Provider   propranolol (INDERAL) 20 mg tablet Take 1 Tab by mouth three (3) times daily. 17  Yes Ida Fuentes MD   amiodarone (CORDARONE) 200 mg tablet Take 100 mg by mouth daily. Yes Historical Provider   omeprazole (PRILOSEC) 20 mg capsule Take 20 mg by mouth two (2) times a day.    Yes Historical Provider   gabapentin (NEURONTIN) 400 mg capsule Take  by mouth. 6/25/11  Yes Phys Other, MD   polyethylene glycol (MIRALAX) 17 gram/dose powder Take 17 g by mouth daily. 1 tablespoon with 8 oz of water daily 4/8/17   Rd Liz Royal MD   clotrimazole-betamethasone (LOTRISONE) topical cream Apply  to affected area two (2) times a day. Apply to affected area 3/29/15   Sylvia Sparrow MD   glipiZIDE-metFORMIN (METAGLIP) 2.5-500 mg per tablet Take 1.5 Tabs by mouth Before breakfast and dinner. Historical Provider   nortriptyline (PAMELOR) 10 mg capsule Take 20 mg by mouth nightly. Historical Provider   ergocalciferol (ERGOCALCIFEROL) 50,000 unit capsule Take 50,000 Units by mouth. Historical Provider   Flaxseed Oil oil by Does Not Apply route. Historical Provider   omega-3 fatty acids-vitamin e (FISH OIL) 1,000 mg cap Take 1 Cap by mouth. Historical Provider   GLUCOSAMINE/CHONDRO QUEZADA A (COSAMIN DS PO) Take  by mouth. Historical Provider   GINKGO BILOBA (GINKOBA PO) Take  by mouth. Historical Provider   ACETAMINOPHEN (TYLENOL EXTRA STRENGTH PO) Take  by mouth. Historical Provider   mometasone-formoterol (DULERA) 200-5 mcg/actuation HFA inhaler Take 2 Puffs by inhalation two (2) times a day. Historical Provider   albuterol (PROVENTIL VENTOLIN) 2.5 mg /3 mL (0.083 %) nebulizer solution 2.5 mg by Nebulization route four (4) times daily. Historical Provider   VIT A/VIT C/VIT E/ZINC/COPPER (OCUVITE PRESERVISION PO) Take 1 Cap by mouth daily.     Historical Provider       Past Medical History:   Diagnosis Date    Chronic pain     Peripheral neuropathy    Dementia     Diabetes ([address]     Parkinson's disease ([address]         Past Surgical History:   Procedure Laterality Date    ABDOMEN SURGERY PROC UNLISTED  5216    Umbilical Hernia repair    CARDIAC SURG PROCEDURE UNLIST  4/2014    valve replacement       Social History   Substance Use Topics    Smoking status: Former Smoker     Packs/day: 1.00 Years: 60.00     Quit date: 3/30/2013    Smokeless tobacco: Never Used    Alcohol use No        Family History   Problem Relation Age of Onset    Diabetes Mother     Parkinsonism Father     Diabetes Brother         Review of Systems:  Unobtainable from the patient    Examination:    Constitutional:    Visit Vitals    BP (!) 108/94 (BP 1 Location: Left arm, BP Patient Position: At rest;Supine; Head of bed elevated (Comment degrees))    Pulse 68    Temp 97.8 °F (36.6 °C)    Resp 30    Ht 5' 5\" (1.651 m)    Wt 59 kg (130 lb)    SpO2 (!) 87%    BMI 21.63 kg/m2         General:  Weak and ill looking patient in no acute distress, restless  Eyes: Pink conjunctivae, PERRLA with no discharge. Normal eye movements  Ear, Nose, Mouth & Throat: No ottorrhea, rhinorrhea, non tender sinuses, dry mucous membranes  Respiratory:  + accessory muscle use, decreased breath sounds with scattered coarse crackles. + wheezes  Cardiovascular:  No JVD or murmurs, regular and normal S1, S2 without thrills, bruits or peripheral edema. Capillary refil+, good distal pulses  GI & :  Soft abdomen, non-tender, non-distended, normoactive bowel sounds with no palpable organomegaly  Heme:  No cervical or axillary adenopathy. Musculoskeletal:  No cyanosis, clubbing, atrophy or deformities  Skin:  No rashes, bruising or ulcers   Neurological: Awake and alert but seems confused.  Speech is clear, CNs 2-12 are grossly intact and otherwise non focal  Psychiatric:  Has a poor insight to her illness   ________________________________________________________________________    Data Review:    Labs:    Recent Labs      06/22/18   2132   WBC  12.2*   HGB  14.4   HCT  43.9   PLT  288     Recent Labs      06/22/18   2132   NA  137   K  3.8   CL  102   CO2  25   GLU  232*   BUN  13   CREA  0.88   CA  9.5   ALB  3.6   SGOT  21   ALT  17     No components found for: GLPOC  No results for input(s): PH, PCO2, PO2, HCO3, FIO2 in the last 72 hours. Recent Labs      06/22/18   2132   INR  1.0       Radiological Studies:      Chest X-ray - normal     I have also reviewed available old medical records. Assessment & Impression:     Ms. Gladis Aase is a 80 y.o. female being evaluated for:     Active Problems:    SOB (shortness of breath) (6/22/2018)      Hypoxia (6/22/2018)      Dementia (6/22/2018)      Essential tremor (6/22/2018)      Type 2 diabetes mellitus, without long-term current use of insulin ([address] (6/22/2018)         Plan of management:    SOB (shortness of breath) (6/22/2018)/ Hypoxia (6/22/2018): likely has acute bronchitis. Admit to hospital. Supplemental oxygen. Start IV doxycycline, Nebs, she is allergic to prednisone. Follow clinically    Type 2 diabetes mellitus, without long-term current use of insulin ([address] (6/22/2018): SSI and DM diet.  Resume home medications once verified    Dementia (6/22/2018): resume home medications once fully verified    Essential tremor (6/22/2018): resume Inderal once verified    Code Status:  Full    Surrogate decision maker: Family    Risk of deterioration: high      Total time spent for the care of the patient: [address] [facility] discussed with: Patient, Nursing Staff and ED physician    Discussed:  Code Status, Care Plan and D/C Planning    Prophylaxis:  Lovenox    Probable Disposition:  SNF/LTC           ___________________________________________________    Attending Physician: Samantha Mercer MD

## 2018-06-23 NOTE — ED NOTES
Pt extremely agitated and combative; shouting and cursing. All attempts to calm and keep pt in stretcher have been unsuccessful.

## 2018-06-23 NOTE — ED NOTES
10:00 PM  Change of shift. Care of patient taken over from Dr Bettie Rubinstein; H&P reviewed, bedside handoff complete. Awaiting labs for admission. Pt with sats 87-91. + rhonchi, difficult and not compliant with treatment    ED EKG interpretation:  Rhythm: normal sinus rhythm; and regular . Rate (approx.): 70;  Axis: normal; P wave: normal; QRS interval: normal ; ST/T wave: non-specific changes;prolong qt  EKG documented by January Mckay MD, scribe, as interpreted by Nettie Song DO, ED MD.    10:42 PM  Spoke with dr Agustin Barrientos who will admit

## 2018-06-23 NOTE — ROUTINE PROCESS
TRANSFER - IN REPORT:    Verbal report received from Jaz(name) on Giuliana Slater  being received from ED(unit) for routine progression of care      Report consisted of patients Situation, Background, Assessment and   Recommendations(SBAR). Information from the following report(s) SBAR, Kardex, ED Summary, Intake/Output, MAR, Accordion and Recent Results was reviewed with the receiving nurse. Opportunity for questions and clarification was provided. Assessment completed upon patients arrival to unit and care assumed.        Primary Nurse Genny Hamman and Sandy Kenney RN performed a dual skin assessment on this patient Impairment noted- see wound doc flow sheet  Nathanael score is 19\

## 2018-06-23 NOTE — ROUTINE PROCESS
Bedside shift change report given to Shalonda Randhawa (oncoming nurse) by Brooke Delgado (offgoing nurse). Report included the following information SBAR, Kardex, ED Summary, Intake/Output, MAR, Accordion, Recent Results and Med Rec Status.

## 2018-06-23 NOTE — ED NOTES
Pt appears calmer and more cooperative. Continues to refuse treatment. Pt eating cake brought from home.

## 2018-06-23 NOTE — PROGRESS NOTES
[facility]: MED RECONCILIATION    Comments/Recommendations:    Patient was discharged from [facility] about a week ago.  Pharmacist spoke with Wendy Gonsalez), who provided medications to patient during her encounter at [facility].  Some medication information provided by Jolene Harden differs from the way the prescription may have been written, based on Rx Query (see side notes).  Since discharge from [facility], there is no way to confirm how the patient is taking her medications and \"if\" she is taking them. Medication(s) ADDED to PTA list:  1. Vit D3 2000 units daily   2. Calcium Carb 600 + Vit D daily  3. Systane eye drops prn  4. Dulcolax prn    Medication(s) REMOVED from PTA list:  1. APAP   2. Albuterol nebs (per Jolene Harden- she did not use at [facility] [place] [facility])  3. Lotrisone cream  4. Ergocaliferol 88472 units (was taking Vit D 3)  5. Flaxseed oil  6. Dulera 200-5mcg inhaler  7. Fish Oil   8. Miralax    Medication(s) ADJUSTED on PTA list:  1. Amiodarone changed to \"200 mg every other day\". 2. Gabapentin changed to 2 caps at 1500 and 3 caps at bedtime. (prescription looks like it was written for a total of 7 caps to be taken daily= 2800 mg per day)  3. Lorazepam 0.5 mg changed to \"HS prn\". (prescription looks like it was written for up to TID administration)  4. Propranolol 20 mg changed to \" 1 tab QHS\". Information per Jolene Harden. (prescription was written for 1 tab (20mg) TID)  5. Omeprazole changed to \"daily as needed\" from BID scheduled. 6. Preservision changed to \"BID\" from daily. Information obtained from: Jolene Harden [phone] from [address]: Prednisone    Prior to Admission Medications:     Prior to Admission Medications   Prescriptions Last Dose Informant Patient Reported? Taking? Calcium-Cholecalciferol, D3, 600 mg(1,500mg) -400 unit cap   Yes Yes   Sig: Take 1 Cap by mouth daily. LORazepam (ATIVAN) 0.5 mg tablet   Yes Yes   Sig: Take 0.5 mg by mouth nightly as needed.    VIT A/VIT C/VIT E/ZINC/COPPER (OCUVITE PRESERVISION PO)  Self Yes Yes   Sig: Take 1 Cap by mouth two (2) times a day. amiodarone (CORDARONE) 200 mg tablet   Yes Yes   Sig: Take 200 mg by mouth every other day. bisacodyl (DULCOLAX, BISACODYL,) 5 mg EC tablet   Yes Yes   Sig: Take 5 mg by mouth daily as needed for Constipation. cholecalciferol, vitamin D3, (VITAMIN D3) 2,000 unit tab   Yes Yes   Sig: Take 2,000 Units by mouth daily. donepezil (ARICEPT) 5 mg tablet   Yes Yes   Sig: Take 5 mg by mouth nightly.   gabapentin (NEURONTIN) 400 mg capsule  Self Yes Yes   Sig: Take 800 mg by mouth daily (after lunch). gabapentin (NEURONTIN) 400 mg capsule   Yes Yes   Sig: Take 1,200 mg by mouth nightly.   galantamine (RAZADYNE) 8 mg tablet   Yes Yes   Sig: Take 8 mg by mouth daily. glipiZIDE-metFORMIN (METAGLIP) 2.5-500 mg per tablet   Yes Yes   Sig: Take 1.5 Tabs by mouth Daily (before breakfast). linaclotide (LINZESS) 290 mcg cap capsule   Yes Yes   Sig: Take 290 mcg by mouth daily as needed. nortriptyline (PAMELOR) 10 mg capsule   Yes Yes   Sig: Take 20 mg by mouth nightly. omeprazole (PRILOSEC) 20 mg capsule  Self Yes Yes   Sig: Take 20 mg by mouth daily as needed. peg 400-propylene glycol (SYSTANE, PROPYLENE GLYCOL,) 0.4-0.3 % drop   Yes Yes   Sig: Apply 1 Drop to eye as needed. propranolol (INDERAL) 20 mg tablet   Yes Yes   Sig: Take 20 mg by mouth nightly.       Facility-Administered Medications: None                  Tamara Padgett, PHARMD   Contact: [phone]

## 2018-06-23 NOTE — DISCHARGE SUMMARY
Physician Discharge Summary     Patient ID:  Evelin Fraser  [id number]  89 y.o.  10/17/1932    Admit date: 6/22/2018    Discharge date and time: 6/23/2018    Admission Diagnoses: SOB (shortness of breath)    Discharge Diagnoses/Hospital Course     Ms. Kayla De León is a 81 yo female with PMH of dementia, essential tremor and DM admitted for dyspnea     Dyspnea - unclear etiology. Noted subjectively by patient at assisted living. CXR showed chronic interstitial changes and nothing acute. Possibly bronchitis as noted to have a productive cough at the facility. This was not demonstrated here. Passed six minute walk prior to d/c with no hypoxia. Will treat presumptively for bronchitis. Hypoxia (6/22/2018) - noted in the ED but off O2 today with no hypoxia. PT performed a six minute walk off O2 and no hypoxia. Dementia (6/22/2018) - with acute delirium likely from hospitalization. Continue outpt regimen. D/c to familiar environment       Essential tremor (6/22/2018) - continue propanolol      Type 2 diabetes mellitus, without long-term current use of insulin ([facility] [address] (6/22/2018) - due to age and dementia, would allow for permissive hyperglycemia. A1c pending     PCP: Jonas Jackson DO     Consults: None    Discharge Exam:  Visit Vitals    /80 (BP 1 Location: Right arm)    Pulse 86    Temp 97.9 °F (36.6 °C)    Resp 18    Ht 5' 5\" (1.651 m)    Wt 59 kg (130 lb)    SpO2 94%    BMI 21.63 kg/m2     General: Chronically ill appearing. NAD. Eyes: Pink conjunctivae, PERRLA with no discharge. Normal eye movements  Ear, Nose, Mouth & Throat: No ottorrhea, rhinorrhea, non tender sinuses, dry mucous membranes  Respiratory: Bibasilar crackles. No wheezing. No increased WOB  Cardiovascular:  No JVD or murmurs, regular and normal S1, S2 without thrills, bruits or peripheral edema.  Capillary refil+, good distal pulses  GI & :  Soft abdomen, non-tender, non-distended, normoactive bowel sounds with no palpable organomegaly  Heme:  No cervical or axillary adenopathy. Musculoskeletal:  No cyanosis, clubbing, atrophy or deformities  Skin:  No rashes, bruising or ulcers   Neurological: Confused. Mildly agitated. Speech is clear, CNs 2-12 are grossly intact and otherwise non focal  Psychiatric:  Has a poor insight to her illness     Disposition: custodial    Patient Instructions:   Current Discharge Medication List      START taking these medications    Details   doxycycline (ADOXA) 100 mg tablet Take 1 Tab by mouth two (2) times a day for 5 days. Qty: 10 Tab, Refills: 0      albuterol (ACCUNEB) 1.25 mg/3 mL nebu 3 mL by Nebulization route every six (6) hours as needed. Wheezing or shortness of breath  Qty: 1 Each, Refills: 0         CONTINUE these medications which have NOT CHANGED    Details   !! gabapentin (NEURONTIN) 400 mg capsule Take 1,200 mg by mouth nightly. propranolol (INDERAL) 20 mg tablet Take 20 mg by mouth nightly. cholecalciferol, vitamin D3, (VITAMIN D3) 2,000 unit tab Take 2,000 Units by mouth daily. Calcium-Cholecalciferol, D3, 600 mg(1,500mg) -400 unit cap Take 1 Cap by mouth daily. peg 400-propylene glycol (SYSTANE, PROPYLENE GLYCOL,) 0.4-0.3 % drop Apply 1 Drop to eye as needed. bisacodyl (DULCOLAX, BISACODYL,) 5 mg EC tablet Take 5 mg by mouth daily as needed for Constipation. LORazepam (ATIVAN) 0.5 mg tablet Take 0.5 mg by mouth nightly as needed. linaclotide (LINZESS) 290 mcg cap capsule Take 290 mcg by mouth daily as needed. galantamine (RAZADYNE) 8 mg tablet Take 8 mg by mouth daily. donepezil (ARICEPT) 5 mg tablet Take 5 mg by mouth nightly. nortriptyline (PAMELOR) 10 mg capsule Take 20 mg by mouth nightly. amiodarone (CORDARONE) 200 mg tablet Take 200 mg by mouth every other day. VIT A/VIT C/VIT E/ZINC/COPPER (OCUVITE PRESERVISION PO) Take 1 Cap by mouth two (2) times a day.       omeprazole (PRILOSEC) 20 mg capsule Take 20 mg by mouth daily as needed. !! gabapentin (NEURONTIN) 400 mg capsule Take 800 mg by mouth daily (after lunch). !! - Potential duplicate medications found. Please discuss with provider.       STOP taking these medications       glipiZIDE-metFORMIN (METAGLIP) 2.5-500 mg per tablet Comments: permissive hyperglycemia due to age and dementia   Reason for Stopping:             Activity: Activity as tolerated  Diet: Diabetic Diet  Wound Care: None needed    Follow-up with Odilon Leonard DO in one week     Approximate time spent in patient care on day of discharge: 35 minutes     Signed:  Ramiro Randhawa MD  6/23/2018  11:23 AM

## 2018-09-06 PROBLEM — R45.1 AGITATION: Status: ACTIVE | Noted: 2018-01-01

## 2018-09-06 PROBLEM — L03.317 CELLULITIS OF MULTIPLE SITES OF BUTTOCK: Status: ACTIVE | Noted: 2018-01-01

## 2018-09-06 PROBLEM — L03.116 CELLULITIS OF LEFT FOOT: Status: ACTIVE | Noted: 2018-01-01

## 2018-09-06 PROBLEM — D72.829 LEUKOCYTOSIS: Status: ACTIVE | Noted: 2018-01-01

## 2018-09-06 PROBLEM — N39.0 UTI (URINARY TRACT INFECTION): Status: ACTIVE | Noted: 2018-01-01

## 2018-09-06 PROBLEM — F03.918 DEMENTIA WITH BEHAVIORAL DISTURBANCE: Status: ACTIVE | Noted: 2018-01-01

## 2018-09-06 NOTE — PROGRESS NOTES
Care Management Interventions PCP Verified by CM: Yes Last Visit to PCP: 09/06/18 Palliative Care Criteria Met (RRAT>21 & CHF Dx)?: No 
Transition of Care Consult (CM Consult): Discharge Planning MyChart Signup: No 
Discharge Durable Medical Equipment: No 
Health Maintenance Reviewed: Yes Physical Therapy Consult: No 
Occupational Therapy Consult: No 
Speech Therapy Consult: No 
Current Support Network: Lives Alone, Has Personal Caregivers Confirm Follow Up Transport:  (TBD) Plan discussed with Pt/Family/Caregiver: Yes [employer] Information Provided?: No 
Discharge Location Discharge Placement:  (TBD)

## 2018-09-06 NOTE — Clinical Note
Status[de-identified] Inpatient [101] Type of Bed: Medical [8] Inpatient Hospitalization Certified Necessary for the Following Reasons: 4. Patient requires ICU level of care interventions (further clarification in H&P documentation) Admitting Diagnosis: UTI (urinary tract infection) [094341] Admitting Diagnosis: Agitation [610033] Admitting Diagnosis: Dementia with behavioral disturbance [7458578] Admitting Diagnosis: Cellulitis of left foot [2348836] Admitting Diagnosis: Cellulitis of multiple sites of buttock [7908145] Admitting Diagnosis: Leukocytosis [929577] Admitting Physician: Delonte Garcia Attending Physician: Delonte Garcia Estimated Length of Stay: 2 Midnights Discharge Plan[de-identified] Home with Office Follow-up

## 2018-09-06 NOTE — ED PROVIDER NOTES
HPI Comments: 80 y.o. female with past medical history significant for dementia, diabetes, Parkinson's disease who presents from PCP office via EMS with chief complaint of left foot pain. Patient reports wounds on both lower extremities for the past several weeks, \"maybe months. \"  Patient states the ulcer on her left foot is getting worse and is painful. Patient was sent by PCP for combative behavior at nursing home. Patient states she gets angry with the staff at the nursing home. Patient is not on any blood thinners. She denies fever, nausea, vomiting, HI, hallucinations. There are no other acute medical concerns at this time. Social hx: Resides at [facility] PCP: Tayler Todd, DO Note written by Victoria Quiroga. Gregory Gonsales, as dictated by Lior Santiago MD 2:53 PM 
 
 
The history is provided by the patient. Past Medical History:  
Diagnosis Date  Chronic pain Peripheral neuropathy  Dementia  Diabetes ([facility] [address]  Parkinson's disease ([facility] [address] Past Surgical History:  
Procedure Laterality Date [address] [place] [address] UNLISTED  2011 Umbilical Hernia repair  CARDIAC SURG PROCEDURE UNLIST  4/2014  
 valve replacement Family History:  
Problem Relation Age of Onset  Diabetes Mother  Parkinsonism Father  Diabetes Brother Social History Social History  Marital status:  Spouse name: N/A  
 Number of children: N/A  
 Years of education: N/A Occupational History  Not on file. Social History Main Topics  Smoking status: Former Smoker Packs/day: 1.00 Years: 60.00 Quit date: 3/30/2013  Smokeless tobacco: Never Used  Alcohol use No  
 Drug use: No  
 Sexual activity: Not on file Other Topics Concern  Not on file Social History Narrative ALLERGIES: Prednisone Review of Systems Constitutional: Negative for activity change, chills and fever. HENT: Negative for nosebleeds, sore throat, trouble swallowing and voice change. Eyes: Negative for visual disturbance. Respiratory: Negative for shortness of breath. Cardiovascular: Negative for chest pain and palpitations. Gastrointestinal: Negative for abdominal pain, constipation, diarrhea and nausea. Genitourinary: Negative for difficulty urinating, dysuria, hematuria and urgency. Musculoskeletal: Negative for back pain, neck pain and neck stiffness. Skin: Positive for wound. Negative for color change. Allergic/Immunologic: Negative for immunocompromised state. Neurological: Negative for dizziness, seizures, syncope, weakness, light-headedness, numbness and headaches. Psychiatric/Behavioral: Positive for agitation. Negative for behavioral problems, confusion, hallucinations, self-injury and suicidal ideas. All other systems reviewed and are negative. Vitals:  
 09/06/18 1426 BP: 116/63 Pulse: 75 Resp: 14 Temp: 98.2 °F (36.8 °C) SpO2: 96% Height: 5' 2\" (1.575 m) Physical Exam  
Constitutional: She is oriented to person, place, and time. She appears well-developed and well-nourished. No distress. HENT:  
Head: Normocephalic and atraumatic. Eyes: Pupils are equal, round, and reactive to light. Neck: Normal range of motion. Neck supple. Cardiovascular: Normal rate, regular rhythm and normal heart sounds. Exam reveals no gallop and no friction rub. No murmur heard. Pulmonary/Chest: Effort normal and breath sounds normal. No respiratory distress. She has no wheezes. Abdominal: Soft. Bowel sounds are normal. She exhibits no distension. There is no tenderness. There is no rebound and no guarding. Musculoskeletal: Normal range of motion. Neurological: She is alert and oriented to person, place, and time. Skin: Skin is warm. No rash noted. She is not diaphoretic.   
Scattered ulcers on bilateral lower extremities, worse on left lateral foot with 4cm diameter without surrounding erythema, purulent discharge with significant crusting. Psychiatric: She has a normal mood and affect. Her behavior is normal. Judgment and thought content normal.  
Nursing note and vitals reviewed. Note written by Carlos Wright, as dictated by Ania Bentley MD 2:54 PM 
  
 
[facility] 
 
 
ED Course This is an 80-year-old female with past medical history, review of systems, physical exam as above, presenting with complaints of scattered lower extremity ulcers. The patient they have been present for weeks to months, and she was sent by her primary care physician for similar complaints. She denies fevers, chills, nausea, vomiting, chest pain or shortness of breath. She states she has not seen any specialists for these ulcers. Chart review indicates the primary care physician's office is also concern for aggressive behavior, the setting of dementia. Patient denies auditory or visual hallucinations, denies homicidal or suicidal ideation. Physical exam remarkable for well-appearing elderly female, with scattered dry lower extremity ulcerations, the worst of which over the lateral aspect of the left foot, approximately 4 cm in diameter surrounding erythema, mild purulent discharge, tender to palpation. Plan to consult case management for assistance in assessing her placement, we'll obtain CMP, CBC, plain films of the left foot. We will make a disposition based on patient's diagnostics and response to therapy. Procedures BESIDE SIGN OUT: 
4:30 PM 
Discussed pt's hx, disposition, and available diagnostic and imaging results with Dr. Debby Dutta at bedside with the patient. Reviewed care plans. Both providers and patient are in agreement with care plan. Dr. Savanna Su is transferring care of the pt to Dr. Eryn Isabel at this time.

## 2018-09-06 NOTE — BSMART NOTE
Adult Protective Services returned writer's call at 6:52pm.  
Jf Whitley provided information to  Elieser Laniern with the hotline. He stated that they do not provide reference numbers for APS report. ANA Lennon, Supervisee in Social Work
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
## 2018-09-06 NOTE — IP AVS SNAPSHOT
[address] [facility] [address] [place] [address] 
[phone] Patient: Iris Mandel MRN: [id number] :10/17/1932 You are allergic to the following Allergen Reactions Prednisone Other (comments) \"I have the flu; I'm not going to take it\" Immunizations Administered for This Admission Name Date Influenza Vaccine (Quad) PF  Deferred () Recent Documentation Height Weight Breastfeeding? BMI OB Status Smoking Status 1.575 m 50 kg No 20.16 kg/m2 Postmenopausal Former Smoker Emergency Contacts  (Rel.) Home Phone Work Phone Mobile Phone No name specified -- -- -- About your hospitalization You were admitted on:  2018 You last received care in the:  [facility] You were discharged on:  2018 Why you were hospitalized Your primary diagnosis was:  Uti (Urinary Tract Infection) Your diagnoses also included:  Leukocytosis, Agitation, Cellulitis Of Left Foot, Cellulitis Of Multiple Sites Of Buttock, Dementia With Behavioral Disturbance Providers Seen During Your Hospitalization Provider Specialty Primary office phone Mary Leon MD Emergency Medicine [phone] Dana Strange MD Family Practice [phone] [facility] MD Rain Internal Medicine [phone] Kain Langley MD Internal Medicine [phone] Kay Rock MD Hospitalist [phone] Rock Hemanth MD Hospitalist [phone] Reyes Blunt, MD Internal Medicine [phone] Your Primary Care Physician (PCP) Primary Care Physician Office Phone Office Fax Ariana Camarena [phone] Follow-up Information Follow up With Details Comments Contact Info Dayna Florez DO  follow up post hospitalization [address] [facility] [address] [facility] [address] 
[phone] My Medications STOP taking these medications   
 donepezil 5 mg tablet Commonly known as:  ARICEPT  
   
  
 galantamine 8 mg tablet Commonly known as:  RAZADYNE  
   
  
 nortriptyline 10 mg capsule Commonly known as:  PAMELOR  
   
  
  
TAKE these medications as instructed Instructions Each Dose to Equal  
 Morning Noon Evening Bedtime  
 albuterol 1.25 mg/3 mL Nebu Commonly known as:  Shay Herrera Your last dose was: Your next dose is:    
   
   
 3 mL by Nebulization route every six (6) hours as needed. Wheezing or shortness of breath 1.25 mg  
    
   
   
   
  
 amiodarone 200 mg tablet Commonly known as:  CORDARONE Your last dose was: Your next dose is: Take 200 mg by mouth every other day. 200 mg Calcium-Cholecalciferol (D3) 600 mg(1,500mg) -400 unit Cap Your last dose was: Your next dose is: Take 1 Cap by mouth daily. 1 Cap  
    
   
   
   
  
 collagenase 250 unit/gram ointment Commonly known as:  SANTYL Your last dose was: Your next dose is:    
   
   
 Apply 1 Each to affected area daily. 1 Each DULCOLAX (BISACODYL) 5 mg EC tablet Generic drug:  bisacodyl Your last dose was: Your next dose is: Take 5 mg by mouth daily as needed for Constipation. 5 mg  
    
   
   
   
  
 * gabapentin 400 mg capsule Commonly known as:  NEURONTIN Your last dose was: Your next dose is: Take 1,200 mg by mouth nightly. 1200 mg  
    
   
   
   
  
 * gabapentin 400 mg capsule Commonly known as:  NEURONTIN Your last dose was: Your next dose is: Take 800 mg by mouth daily (after lunch). 800 mg  
    
   
   
   
  
 haloperidol 1 mg tablet Commonly known as:  HALDOL Your last dose was: Your next dose is: Take 1 Tab by mouth three (3) times daily. 1 mg LINZESS 290 mcg Cap capsule Generic drug:  linaclotide Your last dose was: Your next dose is: Take 290 mcg by mouth daily as needed. 290 mcg LORazepam 0.5 mg tablet Commonly known as:  ATIVAN Your last dose was: Your next dose is: Take 0.5 mg by mouth nightly as needed. 0.5 mg  
    
   
   
   
  
 OCUVITE PRESERVISION PO Your last dose was: Your next dose is: Take 1 Cap by mouth two (2) times a day. 1 Cap PriLOSEC 20 mg capsule Generic drug:  omeprazole Your last dose was: Your next dose is: Take 20 mg by mouth daily as needed. 20 mg  
    
   
   
   
  
 propranolol 20 mg tablet Commonly known as:  INDERAL Your last dose was: Your next dose is: Take 20 mg by mouth nightly. 20 mg  
    
   
   
   
  
 senna-docusate 8.6-50 mg per tablet Commonly known as:  Ulysess Caras Your last dose was: Your next dose is: Take 1 Tab by mouth daily. 1 Tab SITagliptin 100 mg tablet Commonly known as:  Darci Dubin Start taking on:  9/26/2018 Your last dose was: Your next dose is: Take 1 Tab by mouth daily. 100 mg SYSTANE (PROPYLENE GLYCOL) 0.4-0.3 % Drop Generic drug:  peg 400-propylene glycol Your last dose was: Your next dose is:    
   
   
 Apply 1 Drop to eye as needed. 1 Drop VITAMIN D3 2,000 unit Tab Generic drug:  cholecalciferol (vitamin D3) Your last dose was: Your next dose is: Take 2,000 Units by mouth daily. 2000 Units * Notice: This list has 2 medication(s) that are the same as other medications prescribed for you.  Read the directions carefully, and ask your doctor or other care provider to review them with you. Where to Get Your Medications Information on where to get these meds will be given to you by the nurse or doctor. ! Ask your nurse or doctor about these medications  
  collagenase 250 unit/gram ointment  
 haloperidol 1 mg tablet  
 senna-docusate 8.6-50 mg per tablet SITagliptin 100 mg tablet Discharge Instructions Discharge Instructions PATIENT ID: Sue Winston MRN: [id number] YOB: 1932 DATE OF ADMISSION: 9/6/2018  2:09 PM   
DATE OF DISCHARGE: 9/25/2018 PRIMARY CARE PROVIDER: Mahamed Ruth DO  
 
ATTENDING PHYSICIAN: Ching Coronel MD 
DISCHARGING PROVIDER: Reji Hugo NP To contact this individual call [phone] and ask the  to page. If unavailable ask to be transferred the Adult Hospitalist Department. DISCHARGE DIAGNOSES: UTI, cellulitis, dementia CONSULTATIONS: ED CONSULT TO SENIOR SERVICES CASE MANAGEMENT 
ED CONSULT TO SENIOR SERVICES NURSE PRACTITIONER 
IP CONSULT TO PSYCHIATRY IP CONSULT TO PODIATRY PROCEDURES/SURGERIES: * No surgery found * PENDING TEST RESULTS:  
At the time of discharge the following test results are still pending: none FOLLOW UP APPOINTMENTS:  
Follow-up Information Follow up With Details Comments Contact Drew Taylor DO  follow up post hospitalization [address] [facility] [address] [facility] 7 [address] [phone] ADDITIONAL CARE RECOMMENDATIONS: 
You have completed antibiotics for bladder infection and cellulitis Home health nurses will come visit with you and do wound care Check blood sugars twice a day New medication: Haldol (control mood/behavior), Januvia (for diabetes), Senna-docusate is a combination pill which has a laxative and stool softner in one pill. Noretriptyline was discontinued by psychiatry. Galantamine and aricept were also discontinued. DIET: diabetic ACTIVITY: Activity as tolerated WOUND CARE: Apply Santyl ointment to left dorsal foot and right lateral lower leg wounds then cover with Aquacel AG and secure with Mepilex Border dressings; cleanse with saline. EQUIPMENT needed: none DISCHARGE MEDICATIONS: 
 See Medication Reconciliation Form · It is important that you take the medication exactly as they are prescribed. · Keep your medication in the bottles provided by the pharmacist and keep a list of the medication names, dosages, and times to be taken in your wallet. · Do not take other medications without consulting your doctor. NOTIFY YOUR PHYSICIAN FOR ANY OF THE FOLLOWING:  
Fever over 101 degrees for 24 hours. Chest pain, shortness of breath, fever, chills, nausea, vomiting, diarrhea, change in mentation, falling, weakness, bleeding. Severe pain or pain not relieved by medications. Or, any other signs or symptoms that you may have questions about. DISPOSITION: 
X  Home With: 
 OT  PT [facility]  RN  
  
 SNF/Inpatient Rehab/LTAC Independent/assisted living Hospice Other: CDMP Checked: Yes X PROBLEM LIST Updated: Yes X Signed:  
Jaymie Harrell NP 
9/25/2018 
8:46 AM 
 
 Sitagliptin (Januvia) - (By mouth) Why this medicine is used:  
Treats type 2 diabetes. Contact a nurse or doctor right away if you have: 
· Fast or pounding heartbeat · Blistering, peeling, red skin rash · Trouble breathing, cold sweat, bluish-colored skin, swelling · Swelling in your hands, ankles, or feet · Shaking, trembling, sweating, faintness or lightheadedness, confusion · Sudden and severe stomach pain, hunger, nausea, vomiting, fever Common side effects: 
· Cough, runny or stuffy nose, sore throat · Headache © 2017 [facility] Information is for End User's use only and may not be sold, redistributed or otherwise used for commercial purposes. I have reviewed discharge instructions with the patient. The patient verbalized understanding. [employer] Activation Thank you for requesting access to [employer]. Please follow the instructions below to securely access and download your online medical record. [employer] allows you to send messages to your doctor, view your test results, renew your prescriptions, schedule appointments, and more. How Do I Sign Up? 1. In your internet browser, go to [email or web address] 
2. Click on the First Time User? Click Here link in the Sign In box. You will be redirect to the New Member Sign Up page. 3. Enter your [employer] Access Code exactly as it appears below. You will not need to use this code after youve completed the sign-up process. If you do not sign up before the expiration date, you must request a new code. [employer] Access Code: Activation code not generated Current [employer] Status: Active (This is the date your [employer] access code will ) 4. Enter the last four digits of your Social Security Number (xxxx) and Date of Birth (mm/dd/yyyy) as indicated and click Submit. You will be taken to the next sign-up page. 5. Create a [employer] ID. This will be your [employer] login ID and cannot be changed, so think of one that is secure and easy to remember. 6. Create a [employer] password. You can change your password at any time. 7. Enter your Password Reset Question and Answer. This can be used at a later time if you forget your password. 8. Enter your e-mail address. You will receive e-mail notification when new information is available in [address] 9. Click Sign Up. You can now view and download portions of your medical record. 10. Click the Download Summary menu link to download a portable copy of your medical information. Additional Information If you have questions, please visit the Frequently Asked Questions section of the [employer] website at [email or web address] Remember, MyChart is NOT to be used for urgent needs. For medical emergencies, dial 911. Discharge Orders None Introducing [facility]! Dear Loly Hernandez: Thank you for requesting a [employer]ian account. Our records indicate that you already have an active [employer]ian account. You can access your account anytime at [email or web address] Did you know that you can access your hospital and ER discharge instructions at any time in Sumerian? You can also review all of your test results from your hospital stay or ER visit. Additional Information If you have questions, please visit the Frequently Asked Questions section of the [employer] website at [email or web address] Remember, MyChart is NOT to be used for urgent needs. For medical emergencies, dial 911. Now available from your iPhone and Android! General Information Please provide this summary of care documentation to your next provider. Patient Signature:  ____________________________________________________________ Date:  ____________________________________________________________  
  
Maria T Johnson Provider Signature:  ____________________________________________________________ Date:  ____________________________________________________________
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
## 2018-09-06 NOTE — ED NOTES
pts family member called (granddaughter). States that grandmother had a stroke 3 months ago, 4 weeks of rehab after stroke, unable to walk as much since stroke, bladder leaks upon standing, 2 UTIs in last 2 weeks. Pt has been feisty and Refusing care. Pt has seen psychologist, mental health, counselors, and other hospitals.
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
## 2018-09-07 NOTE — PROGRESS NOTES
Spoke with Nuris Moran at Dr. Kennedy Proctor office about consult. Pt to be NPO until Dr. Philly Arreguin sees her.

## 2018-09-07 NOTE — BSMART NOTE
Writer spoke with Dr. Charu Talley, on call psychiatrist. He states he believes the behavior to be caused by medical etiology (UTI). He states that this patient should be admitted medically to treat this. He is willing to speak with hospitalist if necessary for doc to doc consult. ANA Ferris, Supervisee in Social Work

## 2018-09-07 NOTE — CDMP QUERY
Dear Dr. Jhon Saxena, Please clarify if this patient is (was) being treated/managed for:  
 
=> Metabolic encephalopathy in the setting of UTI with known dementia requiring IV antibiotics, lab monitoring, Psychiatric consult 
=> Other explanation of clinical findings 
=> Clinically Undetermined (no explanation for clinical findings) The medical record reflects the following clinical findings, treatment, and risk factors. Risk Factors:  hx dementia Clinical Indicators:  per H&P:UTI and Aggressive behavior with agitation, refusing IV access and yelling out Treatment: Rocephin, lab monitoring, Psych consult, Ativan PRN, Please clarify and document your clinical opinion in the progress notes and discharge summary including the definitive and/or presumptive diagnosis, (suspected or probable), related to the above clinical findings. Please include clinical findings supporting your diagnosis. Thank You 
Lois Barker,MSN,BSN,RN,[facility] 
[phone]
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
## 2018-09-07 NOTE — PROGRESS NOTES
TRANSFER - IN REPORT: 
 
Verbal report received from [facility], RN (name) on Kike Divers  being received from Emergency Department (unit) for routine progression of care. Report consisted of patients Situation, Background, Assessment and  
Recommendations(SBAR). Information from the following report(s) SBAR, Kardex, ED Summary, Intake/Output, MAR and Recent Results was reviewed with the receiving nurse. Opportunity for questions and clarification was provided. Assessment completed upon patients arrival to unit and care assumed. Primary Nurse Raysa Johnson and Domo Waterman RN performed a dual skin assessment on this patient. Impairment noted- abrasions on left upper extremity, scabbed wounds on bilateral lower extremities, excoriation on buttocks and sacrum, skin tear on sacrum, red coccyx with minimal blanching. Wound care consult ordered. Nathanael score is 15. 
 
2:30 AM: Patient is refusing to have another IV placed and refusing labs, stating that she \"has been stuck too many times already. \" She allowed me to swab her nares for MRSA and also allowed me to swab her left foot wound. Will attempt IV and labs again later this morning.

## 2018-09-07 NOTE — PROGRESS NOTES
Spiritual Care Assessment/Progress Note [address] 
 
 
NAME: Anne García      MRN: [id number] AGE: 80 y.o. SEX: female Jainism Affiliation: Mosque Language: [place] 9/7/2018     Total Time (in minutes): 5 Spiritual Assessment begun in [facility] through conversation with: 
  
    [x]Patient        [] Family    [] Friend(s) Reason for Consult: Advance medical directive consult Spiritual beliefs: (Please include comment if needed) 
   [] Identifies with a gabriel tradition:     
   [] Supported by a gabriel community:        
   [] Claims no spiritual orientation:       
   [] Seeking spiritual identity:            
   [] Adheres to an individual form of spirituality:       
   [x] Not able to assess:                   
 
    
Identified resources for coping:  
   [] Prayer                           
   [] Music                  [] Guided Imagery 
   [] Family/friends                 [] Pet visits [] Devotional reading                         [x] Unknown 
   [] Other:                                          
 
 
Interventions offered during this visit: (See comments for more details) Patient Interventions: Advance medical directive consult Plan of Care: 
 
 [] Support spiritual and/or cultural needs  
 [] Support AMD and/or advance care planning process    
 [] Support grieving process 
 [] Coordinate Rites and/or Rituals  
 [] Coordination with community clergy [] No spiritual needs identified at this time 
 [] Detailed Plan of Care below (See Comments)  [] Make referral to Music Therapy 
[] Make referral to Pet Therapy    
[] Make referral to Addiction services 
[] Make referral to [facility] 
[] Make referral to Spiritual Care Partner 
[] No future visits requested       
[x] Follow up visits as needed Comments: Advance Medical Directive (AMD) consult.  Upon review of pt chart and doctor notes we are unable to complete AMD at this time.  follow up as needed. CONSTANCE OmerDiv, MACE 
 [phone])

## 2018-09-07 NOTE — PROGRESS NOTES
The following herbal, alternative, and/or nutritional/dietary supplement product(s) has been discontinued  per [facility] approved policy:   
Ocuvite daily Please reorder upon discharge if appropriate.

## 2018-09-07 NOTE — ACP (ADVANCE CARE PLANNING)
Advance Medical Directive (AMD) consult. Upon review of pt chart and doctor notes we are unable to complete AMD at this time.  follow up as needed. Jerome Omer, [place] [phone])
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
## 2018-09-07 NOTE — PROGRESS NOTES
Pharmacist Note - Vancomycin Dosing Consult provided for this 80 y.o. female for indication of cellulitis. Antibiotic regimen(s): Ceftriaxone + Vancomycin Recent Labs  
   18 
 1130  18 
 1618 WBC  8.5  12.2*  
CREA  0.70   --   
BUN  14   --   
 
Frequency of BMP: DAILY Height: 157.5 cm Weight: 50.7 kg Est CrCl: 46.5 ml/min; Temp (24hrs), Av.2 °F (36.8 °C), Min:98 °F (36.7 °C), Max:98.5 °F (36.9 °C) Cultures: 
 Urine Cx pending  Left Foot Cx rare GPCs, pending MRSA pending Goal trough = 10 - 15 mcg/mL Therapy will be initiated with a loading dose of 1000 mg IV x 1 to be followed by a maintenance dose of 750 mg IV every 16 hours. Pharmacy to follow patient daily and order levels / make dose adjustments as appropriate.

## 2018-09-07 NOTE — PROGRESS NOTES
Hospitalist Progress Note Heber Awad NP Answering service: [phone] OR 1991 from in house phone Cell: [phone] Date of Service:  2018 NAME:  Lee Ann Spicer :  10/17/1932 MRN:  [id number] Admission Summary: Pt presented to the ED with a swollen, red and painful foot. She had been extremely anxious, agitated, combative and uncooperative upon arrival to the ED. According to reports, she had been sent to her PCP office for combative behavior. Pmhx: dementia, Parkinson disease, type 2 diabetes mellitus, chronic pain, peripheral neuropathy Interval history / Subjective:  
Pt in bed, seen along with nursing staff. No significant complaints at this time though she is very upset about having to have another IV but agreeable to allow the nurse to try another. Assessment & Plan:  
 
Urinary tract infection:   
- UA suspicious for UTI 
- Rocephin started in the ED last pm 
- Check urine cultures and adjust antibiotics accordingly. - yeast noted in urine - will not treat for now and await culture Cellulitis/nonhealing L foot wound: - Left foot 3-view x-ray showed nonspecific soft tissue swelling with no fx or OM noted - Podiatry consulted - IV abx started but did not receive vanc this am due to refusal for another IV (pt allowing for IV this am) - pain: tylenol prn Leukocytosis: resolved now Hx Type 2 diabetes mellitus with hyperglycemia, uncontrolled:  
- HgbA1c in am 
- accuchecks ordered with SSI 
- on carbohydrate controlled diet Rash with superimposed cellulitis:  
- buttock/sacral/thigh redness 
- nystatin powder to the affected areas for now, wound care consult Hx Dementia with agressive behavior and agitation:   
- psychiatry to see, wsa initially sent to ED for TDO eval 
 
Hx Parkinson disease:   
- nortriptyline, galantamine, propranalol at home Code status: Full DVT prophylaxis: SCDs Care Plan discussed with: nursing, attending MD 
Disposition: to be determined Hospital Problems  Date Reviewed: 9/6/2018 Codes Class Noted POA Leukocytosis ICD-10-CM: J65.023 ICD-9-CM: 288.60  9/6/2018 Unknown Agitation ICD-10-CM: R45.1 ICD-9-CM: 307.9  9/6/2018 Unknown * (Principal)UTI (urinary tract infection) ICD-10-CM: N39.0 ICD-9-CM: 599.0  9/6/2018 Unknown Cellulitis of left foot ICD-10-CM: L03.116 ICD-9-CM: 682.7  9/6/2018 Unknown Cellulitis of multiple sites of buttock ICD-10-CM: L03.317 ICD-9-CM: 682.5  9/6/2018 Unknown Dementia with behavioral disturbance ICD-10-CM: F03.91 
ICD-9-CM: 294.21  9/6/2018 Unknown Review of Systems:  
Denies HA. No chest pain or pressure. No respiratory or GI complaints. + sacral/buttock pain Vital Signs:  
 Last 24hrs VS reviewed since prior progress note. Most recent are: 
Visit Vitals  BP 92/52 (BP 1 Location: Right arm, BP Patient Position: At rest)  Pulse (!) 57  Temp 98.5 °F (36.9 °C)  Resp 16  
 Ht 5' 2\" (1.575 m)  Wt 50.7 kg (111 lb 12.4 oz)  SpO2 98%  Breastfeeding No  
 BMI 20.44 kg/m2 Intake/Output Summary (Last 24 hours) at 09/07/18 3022 Last data filed at 09/06/18 1913 Gross per 24 hour Intake                0 ml Output              400 ml Net             -400 ml Physical Examination:  
     
Constitutional:  No acute distress, cooperative, pleasant   
ENT:  Oral mucous membranes moist, oropharynx benign. Resp:  CTA bilaterally. No wheezing/rhonchi/rales. No accessory muscle use and on RA  
CV:  Regular rhythm, normal rate. GI:  Soft, non distended, non tender. normoactive bowel sounds Musculoskeletal:  No edema, skin warm, 2+ pulses throughout Neurologic:  Moves all extremities. Alert to self/place Skin: See below for pictures.  Has reddened sacrum, posterior thighs and buttocks - does appear rash like but could be due to moisture or yeast, Scattered bruises and scabbed areas/skin tears to arms and legs. Large scabbed wound to left lateral foot L Arm                                                                 L foot            Bilateral legs            Sacrum Data Review:  
Review and/or order of clinical lab test 
Review and/or order of tests in the radiology section of CPT Review and/or order of tests in the medicine section of CPT Labs:  
 
Recent Labs  
   09/06/18 
 1618 WBC  12.2* HGB  12.2 HCT  39.8 PLT  463* No results for input(s): NA, K, CL, CO2, BUN, CREA, GLU, CA, MG, PHOS, URICA in the last 72 hours. No results for input(s): SGOT, GPT, ALT, AP, TBIL, TBILI, TP, ALB, GLOB, GGT, AML, LPSE in the last 72 hours. No lab exists for component: AMYP, HLPSE No results for input(s): INR, PTP, APTT in the last 72 hours. No lab exists for component: INREXT No results for input(s): FE, TIBC, PSAT, FERR in the last 72 hours. Lab Results Component Value Date/Time Folate 14.9 03/04/2015 02:49 PM  
  
No results for input(s): PH, PCO2, PO2 in the last 72 hours. No results for input(s): CPK, CKNDX, TROIQ in the last 72 hours. No lab exists for component: CPKMB No results found for: CHOL, CHOLX, CHLST, CHOLV, HDL, LDL, LDLC, DLDLP, TGLX, TRIGL, TRIGP, CHHD, CHHDX Lab Results Component Value Date/Time Glucose (POC) 196 (H) 02/24/2017 04:03 PM  
 Glucose (POC) 120 (H) 04/15/2014 09:50 AM  
 Glucose (POC) 135 (H) 04/15/2014 09:19 AM  
 Glucose (POC) 118 (H) 12/23/2013 12:13 PM  
 Glucose (POC) 107 (H) 12/23/2013 11:41 AM  
 Glucose (POC) 129 (H) 12/23/2013 10:25 AM  
 
Lab Results Component Value Date/Time  Color YELLOW/STRAW 09/06/2018 07:11 PM  
 Appearance CLOUDY (A) 09/06/2018 07:11 PM  
 Specific gravity 1.015 09/06/2018 07:11 PM  
 pH (UA) 5.5 09/06/2018 07:11 PM  
 Protein NEGATIVE  09/06/2018 07:11 PM  
 Glucose NEGATIVE  09/06/2018 07:11 PM  
 Ketone NEGATIVE  09/06/2018 07:11 PM  
 Bilirubin NEGATIVE  09/06/2018 07:11 PM  
 Urobilinogen 0.2 09/06/2018 07:11 PM  
 Nitrites POSITIVE (A) 09/06/2018 07:11 PM  
 Leukocyte Esterase LARGE (A) 09/06/2018 07:11 PM  
 Epithelial cells FEW 09/06/2018 07:11 PM  
 Bacteria 1+ (A) 09/06/2018 07:11 PM  
 WBC  09/06/2018 07:11 PM  
 RBC 5-10 09/06/2018 07:11 PM  
 
Medications Reviewed:  
 
Current Facility-Administered Medications Medication Dose Route Frequency  amiodarone (CORDARONE) tablet 200 mg  200 mg Oral EVERY OTHER DAY  cholecalciferol (VITAMIN D3) tablet 2,000 Units  2,000 Units Oral DAILY  gabapentin (NEURONTIN) capsule 800 mg  800 mg Oral PCL  gabapentin (NEURONTIN) capsule 1,200 mg  1,200 mg Oral QHS  LORazepam (ATIVAN) tablet 0.5 mg  0.5 mg Oral QHS PRN  
 nortriptyline (PAMELOR) capsule 20 mg  20 mg Oral QHS  polyvinyl alcohol (LIQUIFILM TEARS) 1.4 % ophthalmic solution 1 Drop  1 Drop Both Eyes PRN  propranolol (INDERAL) tablet 20 mg  20 mg Oral QHS  sodium chloride (NS) flush 5-10 mL  5-10 mL IntraVENous Q8H  
 sodium chloride (NS) flush 5-10 mL  5-10 mL IntraVENous PRN  
 cefTRIAXone (ROCEPHIN) 1 g in 0.9% sodium chloride (MBP/ADV) 50 mL  1 g IntraVENous Q24H  
 vancomycin (VANCOCIN) 1,000 mg in 0.9% sodium chloride (MBP/ADV) 250 mL  1,000 mg IntraVENous ONCE  Vancomycin Pharmacy Dosing   Other Rx Dosing/Monitoring  glucose chewable tablet 16 g  4 Tab Oral PRN  
 dextrose (D50W) injection syrg 12.5-25 g  25-50 mL IntraVENous PRN  
 glucagon (GLUCAGEN) injection 1 mg  1 mg IntraMUSCular PRN  
 insulin lispro (HUMALOG) injection   SubCUTAneous AC&HS  nystatin (MYCOSTATIN) 100,000 unit/gram powder   Topical TID  
______________________________________________________________________ EXPECTED LENGTH OF STAY: 3d 0h 
ACTUAL LENGTH OF STAY:          1 Mela Solomon NP

## 2018-09-07 NOTE — PROGRESS NOTES
TRANSFER - OUT REPORT: 
 
Verbal report given to Karo Henriquez, [address] on [place]  being transferred to  [address] for routine progression of care Report consisted of patients Situation, Background, Assessment and  
Recommendations(SBAR).

## 2018-09-07 NOTE — DIABETES MGMT
DTC Consult  Note Recommendations/ Comments: Consult received for assessment of home management. Pt with dementia and is not appropriate for education at this time. Note pt is from a nursing home. No POC glucose or chemistry glucose noted. If appropriate, please consider POC testing AC and hs. Noted A1c ordered. Please re-consult DTC if appropriate. Current hospital DM medication: correction scale high sensitivity. Chart reviewed on Jorge Luis Wolff. Patient is a 80 y.o. female with known history of Type 2 diabetes on no diabetes medications at home. A1c:  
No results found for: HBA1C, HGBE8, [id number] Recent Glucose Results: No results found for: GLU, GLUPOC, GLUCPOC Lab Results Component Value Date/Time Creatinine 0.88 06/22/2018 09:32 PM  
 
Estimated Creatinine Clearance: 37 mL/min (based on Cr of 0.88). Active Orders Diet DIET DIABETIC CONSISTENT CARB Regular PO intake: No data found. Will continue to follow as needed. Thank you Danny Fernando RD, CDE

## 2018-09-07 NOTE — PROGRESS NOTES
Problem: Falls - Risk of 
Goal: *Absence of Falls Document Bishop Morales Fall Risk and appropriate interventions in the flowsheet. Outcome: Progressing Towards Goal 
Fall Risk Interventions: 
Mobility Interventions: Bed/chair exit alarm, Communicate number of staff needed for ambulation/transfer Mentation Interventions: Bed/chair exit alarm, Adequate sleep, hydration, pain control, Door open when patient unattended, More frequent rounding, Room close to nurse's station, Toileting rounds Medication Interventions: Bed/chair exit alarm, Patient to call before getting OOB Elimination Interventions: Bed/chair exit alarm, Call light in reach, Patient to call for help with toileting needs, Toileting schedule/hourly rounds History of Falls Interventions: Bed/chair exit alarm, Door open when patient unattended, Room close to nurse's station

## 2018-09-07 NOTE — PROGRESS NOTES
Bedside and Verbal shift change report given to Costa Guerrero (oncoming nurse) by Manjeet Handy (offgoing nurse). Report included the following information SBAR, Intake/Output and MAR. 
0930: X2  attempts made to insert with no success. 15:55 Patient still does not have access at this time. PICC team notified, this writer was informed that they would not be available at this time.

## 2018-09-07 NOTE — PROGRESS NOTES
Bedside shift change report given to [address] [place] [address] Drive (oncoming nurse) by Migdalia HULL (offgoing nurse). Report included the following information SBAR, Kardex, ED Summary, Procedure Summary, Intake/Output, MAR and Recent Results. 5968 - patient refusing am vitals 1398 - informed pct not to check pt bg since pt resting comfortably in bed with no agitation; pt bg with pt lab work at 024 971 50 30 was 132; pt has not consumed anything since this time.

## 2018-09-07 NOTE — PROGRESS NOTES
NUTRITION COMPLETE ASSESSMENT 
 
RECOMMENDATIONS:  
1. Continue diet as ordered and assist with meal set up as able 
-- If issues with intake, liberalize diet order to Regular without restrictions 2. Weekly weight Interventions/Plan:  
Food/Nutrient Delivery: Ensure Doyce Labs Assessment:  
Reason for Assessment:  
[x]BPA/MST Referral  
 
Diet: Consistent carb Nutritionally Significant Medications: [x] Reviewed & Includes: Vitamin D3 (2000IU); humalog correction scale (high sensitivity), vancomycin Meal Intake: No data found. Pre-Hospitalization: 
Usual Appetite: Poor Diet at Home: Regular Vitamins/Supplements: No 
 
Current Hospitalization:  
Fluid Restriction:  N/A Appetite: Poor PO Ability: Independent Subjective: 
Pt [place]. She is very nervous about having an IV placed. \"I don't know if I can do it anymore. \"  She is not a good historian regarding her intake, but admits that she hasn't been eating well. \"They don't let me go down to the dining room anymore. \" 
 
Objective: 
Chart reviewed, discussed with RN and team during interdisciplinary rounds. Pt admitted with UTI. PMHx: Parkinson's disease, DM2, dementia, chronic pain, peripheral neuropathy, others noted. Pt with multiple wounds. WOCN following. Suspect wound healing will continue to be an issue with limited nutrition and lack of adequate stores. She admits to poor appetite, but cannot give a time frame. Per weights in EHR, pt with 18# weight loss (14%) over the past 2 months, which is severe. She also has visible sternal, clavicle, temporal wasting as well as bicep/tricep wasting. Ensure Enlive BID (700 kcal, 40 g protein). A1c 6.3% (9/7). Patient meets criteria for Severe Protein Calorie Malnutrition as evidenced by:  
ASPEN Malnutrition Criteria Acute Illness, Chronic Illness, or Social/Enviornmental: Acute illness Weight Loss: Greater than 7.5% x 3 mos Muscle Mass: Moderate ASPEN Malnutrition Score - Acute Illness: 12 Acute Illness - Malnutrition Diagnosis: Severe malnutrition. Estimated Nutrition Needs:  
Kcals/day: 1177 Kcals/day (7691-3741 kcal/day (MSJ x 1.3-1.4)) Protein: 49 g (1.3 g/kg) Fluid: 1200 ml (1 mL/kcal) Based On: [place] 1898 Weight Used: Actual wt (50.7 kg) Pt expected to meet estimated nutrient needs:  []   Yes     [x]  No [] Unable to predict at this time Nutrition Diagnosis:  
1. Inadequate protein-energy intake related to mental status, decreased appetite as evidenced by 18# weight loss x 2 months and pt admits minimal intake PTA Goals:   
 Pt to consume at least 50% of all meals and 1 supplement per day over the next 5-7 days Monitoring & Evaluation: - Total energy intake, Liquid meal replacement - Weight/weight change Previous Nutrition Goals Met:   N/A Previous Recommendations:    N/A Education & Discharge Needs: 
 [x] None Identified 
 [] Identified and addressed [x] Participated in care plan, discharge planning, and/or interdisciplinary rounds Cultural, Sabianist and ethnic food preferences identified: None Skin Integrity: []Intact  [x]Other: see pressure injury and excoriation Edema: [x]None []Other Last BM: PTA Food Allergies: [x]None []Other Diet Restrictions: Cultural/Jewish Preference(s): None Anthropometrics:   
Weight Loss Metrics 9/7/2018 6/22/2018 12/17/2017 4/14/2017 4/8/2017 3/28/2017 3/2/2017 Today's Wt 111 lb 12.4 oz 130 lb 130 lb 126 lb 130 lb 130 lb 140 lb BMI 20.44 kg/m2 21.63 kg/m2 21.63 kg/m2 23.05 kg/m2 23.78 kg/m2 23.78 kg/m2 25.61 kg/m2 Weight Source: Bed Height: 5' 2\" (157.5 cm), Body mass index is 20.44 kg/(m^2). IBW : 49.9 kg (110 lb), Usual Body Weight: 58.1 kg (128 lb),   
 
Labs:   
Lab Results Component Value Date/Time  Sodium 138 09/07/2018 11:30 AM  
 Potassium 4.3 09/07/2018 11:30 AM  
 Chloride 104 09/07/2018 11:30 AM  
 CO2 28 09/07/2018 11:30 AM  
 Glucose 133 (H) 09/07/2018 11:30 AM  
 BUN 14 09/07/2018 11:30 AM  
 Creatinine 0.70 09/07/2018 11:30 AM  
 Calcium 9.2 09/07/2018 11:30 AM  
 Magnesium 1.9 12/17/2017 07:55 PM  
 Albumin 3.6 06/22/2018 09:32 PM  
 
Lab Results Component Value Date/Time Hemoglobin A1c 6.3 09/07/2018 11:30 AM  
 
Claudell Medina, [address]

## 2018-09-07 NOTE — ROUTINE PROCESS
TRANSFER - OUT REPORT: 
 
Verbal report given to HungFlorence (name) on Anne García  being transferred to [facility] (unit) for routine progression of care Report consisted of patients Situation, Background, Assessment and  
Recommendations(SBAR). Information from the following report(s) SBAR, Kardex, ED Summary, [facility] and Recent Results was reviewed with the receiving nurse. Lines:    
 
Opportunity for questions and clarification was provided. Patient transported with: 
 [employer]

## 2018-09-07 NOTE — PROGRESS NOTES
Problem: Pressure Injury - Risk of 
Goal: *Prevention of pressure injury Document Nathanael Scale and appropriate interventions in the flowsheet. Outcome: Progressing Towards Goal 
Pressure Injury Interventions: 
Sensory Interventions: Avoid rigorous massage over bony prominences, Assess changes in LOC, Assess need for specialty bed, Float heels, Keep linens dry and wrinkle-free, Minimize linen layers Moisture Interventions: Check for incontinence Q2 hours and as needed, Minimize layers, Maintain skin hydration (lotion/cream) Activity Interventions: Pressure redistribution bed/mattress(bed type) Mobility Interventions: HOB 30 degrees or less, Pressure redistribution bed/mattress (bed type), Turn and reposition approx. every two hours(pillow and wedges) Nutrition Interventions: Document food/fluid/supplement intake, Offer support with meals,snacks and hydration Friction and Shear Interventions: Minimize layers, HOB 30 degrees or less, Apply protective barrier, creams and emollients

## 2018-09-07 NOTE — PROGRESS NOTES
Admission Medication Reconciliation: 
 
Information obtained from:  patient/RxQuery Comments/Recommendations: Updated PTA meds/reviewed patient's allergies. 1)  Spoke with the patient, who is unsure of what medications she is taking. Unable to assess accuracy of PTA medications. 2)  No changes made to PTA med list  
 
3)  [facility] on [address] ([place]) Allergies:  Prednisone Significant PMH/Disease States:  
Past Medical History:  
Diagnosis Date  Chronic pain Peripheral neuropathy  Dementia  Diabetes ([address]  Parkinson's disease ([address] Chief Complaint for this Admission: Chief Complaint Patient presents with  Wound Check  Mental Health Problem Prior to Admission Medications:  
Prior to Admission Medications Prescriptions Last Dose Informant Patient Reported? Taking? Calcium-Cholecalciferol, D3, 600 mg(1,500mg) -400 unit cap   Yes No  
Sig: Take 1 Cap by mouth daily. LORazepam (ATIVAN) 0.5 mg tablet   Yes No  
Sig: Take 0.5 mg by mouth nightly as needed. VIT A/VIT C/VIT E/ZINC/COPPER (OCUVITE PRESERVISION PO)  Self Yes No  
Sig: Take 1 Cap by mouth two (2) times a day. albuterol (ACCUNEB) 1.25 mg/3 mL nebu   No No  
Sig: 3 mL by Nebulization route every six (6) hours as needed. Wheezing or shortness of breath  
amiodarone (CORDARONE) 200 mg tablet   Yes No  
Sig: Take 200 mg by mouth every other day. bisacodyl (DULCOLAX, BISACODYL,) 5 mg EC tablet   Yes No  
Sig: Take 5 mg by mouth daily as needed for Constipation. cholecalciferol, vitamin D3, (VITAMIN D3) 2,000 unit tab   Yes No  
Sig: Take 2,000 Units by mouth daily. donepezil (ARICEPT) 5 mg tablet   Yes No  
Sig: Take 5 mg by mouth nightly.  
gabapentin (NEURONTIN) 400 mg capsule  Self Yes No  
Sig: Take 800 mg by mouth daily (after lunch). gabapentin (NEURONTIN) 400 mg capsule   Yes No  
Sig: Take 1,200 mg by mouth nightly. galantamine (RAZADYNE) 8 mg tablet   Yes No  
Sig: Take 8 mg by mouth daily. linaclotide (LINZESS) 290 mcg cap capsule   Yes No  
Sig: Take 290 mcg by mouth daily as needed. nortriptyline (PAMELOR) 10 mg capsule   Yes No  
Sig: Take 20 mg by mouth nightly. omeprazole (PRILOSEC) 20 mg capsule  Self Yes No  
Sig: Take 20 mg by mouth daily as needed. peg 400-propylene glycol (SYSTANE, PROPYLENE GLYCOL,) 0.4-0.3 % drop   Yes No  
Sig: Apply 1 Drop to eye as needed. propranolol (INDERAL) 20 mg tablet   Yes No  
Sig: Take 20 mg by mouth nightly. Facility-Administered Medications: None

## 2018-09-07 NOTE — CONSULTS
Foot and Ankle Surgery Consult Subjective:  
  
Jenny Nesbitt is a 80 y.o. female she is combative and confused and was reluctant for me to examine her Past Medical History:  
Diagnosis Date  Chronic pain Peripheral neuropathy  Dementia  [facility]  Parkinson's disease ([facility] Past Surgical History:  
Procedure Laterality Date [address] [place] UNLISTED  2011 Umbilical Hernia repair  CARDIAC SURG PROCEDURE UNLIST  4/2014  
 valve replacement Family History Problem Relation Age of Onset  Diabetes Mother  Parkinsonism Father  Diabetes Brother Social History Social History  Marital status:  Spouse name: N/A  
 Number of children: N/A  
 Years of education: N/A Social History Main Topics  Smoking status: Former Smoker Packs/day: 1.00 Years: 60.00 Quit date: 3/30/2013  Smokeless tobacco: Never Used  Alcohol use No  
 Drug use: No  
 Sexual activity: Not Asked Other Topics Concern  None Social History Narrative Current Facility-Administered Medications Medication Dose Route Frequency Provider Last Rate Last Dose  amiodarone (CORDARONE) tablet 200 mg  200 mg Oral EVERY OTHER DAY Kosta Huertas MD   200 mg at 09/07/18 3669  cholecalciferol (VITAMIN D3) tablet 2,000 Units  2,000 Units Oral DAILY Kosta Huertas MD   2,000 Units at 09/07/18 1122  
 gabapentin (NEURONTIN) capsule 800 mg  800 mg Oral PCL Kosta Huertas MD   800 mg at 09/07/18 1500  
 gabapentin (NEURONTIN) capsule 1,200 mg  1,200 mg Oral QHS Kosta Huertas MD   1,200 mg at 09/07/18 8519  LORazepam (ATIVAN) tablet 0.5 mg  0.5 mg Oral QHS PRN Kosta Huertas MD      
 polyvinyl alcohol (LIQUIFILM TEARS) 1.4 % ophthalmic solution 1 Drop  1 Drop Both Eyes PRN Kosta Huertas MD      
 propranolol (INDERAL) tablet 20 mg  20 mg Oral QHS Kosta Huertas MD   20 mg at 09/07/18 8259  sodium chloride (NS) flush 5-10 mL  5-10 mL IntraVENous Q8H Hremila Sosa MD   10 mL at 18 1501  
 sodium chloride (NS) flush 5-10 mL  5-10 mL IntraVENous PRN Hermila Sosa MD      
 cefTRIAXone (ROCEPHIN) 1 g in 0.9% sodium chloride (MBP/ADV) 50 mL  1 g IntraVENous Q24H Hermila Sosa MD      
 Vancomycin Pharmacy Dosing   Other Rx Dosing/Monitoring Hermila Sosa MD      
 glucose chewable tablet 16 g  4 Tab Oral PRN Hermila Sosa MD      
 dextrose (D50W) injection syrg 12.5-25 g  25-50 mL IntraVENous PRN Hermila Sosa MD      
 glucagon (GLUCAGEN) injection 1 mg  1 mg IntraMUSCular PRN Hermila Sosa MD      
 insulin lispro (HUMALOG) injection   SubCUTAneous AC&HS Hermila Sosa MD   Stopped at 18 1130  [START ON 2018] vancomycin (VANCOCIN) 750 mg in 0.9% sodium chloride (MBP/ADV) 250 mL ADV  750 mg IntraVENous Q16H Iraida Marcelo MD      
 miconazole [facility]) 2 % extra thick cream   Topical BID Hemal Antony NP      
 QUEtiapine (SEROquel) tablet 12.5 mg  12.5 mg Oral BID Mercedes Garnica MD      
 QUEtiapine (SEROquel) tablet 12.5 mg  12.5 mg Oral Q8H PRN Mercedes Garnica MD   12.5 mg at 18 1754  ziprasidone (GEODON) 5 mg in sterile water (preservative free) 0.25 mL injection  5 mg IntraMUSCular Q12H PRN Mercedes Garnica MD      
  
 
Allergies Allergen Reactions  Prednisone Other (comments) \"I have the flu; I'm not going to take it\" Review of Systems: 
Review of systems not obtained due to patient factors. Objective:  
 
 Patient Vitals for the past 8 hrs: 
 Temp Pulse Resp SpO2  
18 1617 97.8 °F (36.6 °C) 70 18 97 % Temp (24hrs), Av.1 °F (36.7 °C), Min:97.8 °F (36.6 °C), Max:98.5 °F (36.9 °C) Physical Exam: 
There were multiple excoriations of the b/l lower extremities   No erythema  No edema  No drainage Assessment:  
 
Hospital Problems  Date Reviewed: 2018 Codes Class Noted POA Leukocytosis ICD-10-CM: I34.723 ICD-9-CM: 288.60  9/6/2018 Unknown Agitation ICD-10-CM: R45.1 ICD-9-CM: 307.9  9/6/2018 Unknown * (Principal)UTI (urinary tract infection) ICD-10-CM: N39.0 ICD-9-CM: 599.0  9/6/2018 Unknown Cellulitis of left foot ICD-10-CM: L03.116 ICD-9-CM: 682.7  9/6/2018 Unknown Cellulitis of multiple sites of buttock ICD-10-CM: L03.317 ICD-9-CM: 682.5  9/6/2018 Unknown Dementia with behavioral disturbance ICD-10-CM: F03.91 
ICD-9-CM: 294.21  9/6/2018 Unknown Plan:  
 
Patient is being seen by wound care and this is appropriate - this is not a surgical situation. I will follow up as needed but wound continue current regime for now Signed By: Rhea Mendez DPM   
 September 7, 2018

## 2018-09-07 NOTE — ED NOTES
ED Summary After pt signed out to me, it was discovered that she was sent here for evaluation for TDO by PMD - felt she was unsafe towards herself. [facility] saw her, but she hadn't had labs/urine done yet. UA came back showing UTI. Psych did not want to admit given this could all be medical, but pt not safe to go back to her home. Discussed with the hospitalist who will admit.

## 2018-09-08 NOTE — PROGRESS NOTES
Hospitalist Progress Note Kyleigh Mckeon NP Answering service: [phone] OR [phone] from in house phone Cell: [phone] Date of Service:  2018 NAME:  Jorge Luis Wolff :  10/17/1932 MRN:  [id number] Admission Summary: Pt presented to the ED with a swollen, red and painful foot. She had been extremely anxious, agitated, combative and uncooperative upon arrival to the ED. According to reports, she had been sent to her PCP office for combative behavior. Pmhx: dementia, Parkinson disease, type 2 diabetes mellitus, chronic pain, peripheral neuropathy Interval history / Subjective:  
Pt agitated. Sitter at bedside reports pt was sleeping all morning and she woke her to eat some lunch. Pt is now agitated, refusing care and asking for people to leave her room. She has pulled her IV out. Assessment & Plan:  
 
Urinary tract infection:   
- UA suspicious for UTI 
- Has received rocephin x 2 doses - now no IV 
- Urine culture with >100,000 mixed urogenital dante 
- yeast noted in urine - not evident on culture. - will plan on keflex coverage for UTI and cellulitis Cellulitis/nonhealing L foot wound: - Left foot 3-view x-ray showed nonspecific soft tissue swelling with no fx or OM noted - Podiatry consulted - he has seen (though pt not cooperative) and does not believe this to be a surgical issue, continue with wound care. - had 1 dose of IV vancomycin and now no IV. - treat with kefllex though may be difficult to get pt to take her medications - pain: tylenol prn Leukocytosis: resolved now Hx Type 2 diabetes mellitus with hyperglycemia, uncontrolled:  
- HgbA1c 6.3 
- accuchecks ordered with SSI. Blood glucose 141-220 
- on carbohydrate controlled diet Rash with superimposed cellulitis:  
- buttock/sacral/thigh redness 
- wound care has seen, agree with their recommendations Metabolic Encephalopathy with agressive behavior and agitation and a hx of dementia:   
- could be exacerbated by UTI/cellulitis 
- was initially sent to ED for TDO eval 
- psychiatry has seen and ordered meds 
- believe pt to need inpatient psych admit - especially if she continues to refuse care. - has been prescribed seroquel and geodon Hx Parkinson disease:   
- nortriptyline, galantamine, propranalol at home Code status: Full DVT prophylaxis: SCDs Care Plan discussed with: nursing Disposition: to be determined Hospital Problems  Date Reviewed: 9/6/2018 Codes Class Noted POA Leukocytosis ICD-10-CM: K53.918 ICD-9-CM: 288.60  9/6/2018 Unknown Agitation ICD-10-CM: R45.1 ICD-9-CM: 307.9  9/6/2018 Unknown * (Principal)UTI (urinary tract infection) ICD-10-CM: N39.0 ICD-9-CM: 599.0  9/6/2018 Unknown Cellulitis of left foot ICD-10-CM: L03.116 ICD-9-CM: 682.7  9/6/2018 Unknown Cellulitis of multiple sites of buttock ICD-10-CM: L03.317 ICD-9-CM: 682.5  9/6/2018 Unknown Dementia with behavioral disturbance ICD-10-CM: F03.91 
ICD-9-CM: 294.21  9/6/2018 Unknown Review of Systems:  
Pt not cooperative with exam - \"leave me alone\" Vital Signs:  
 Last 24hrs VS reviewed since prior progress note. Most recent are: 
Visit Vitals  /70 (BP 1 Location: Left arm, BP Patient Position: At rest)  Pulse 70  Temp 97.9 °F (36.6 °C)  Resp 20  
 Ht 5' 2\" (1.575 m)  Wt 50.7 kg (111 lb 12.4 oz)  SpO2 92%  Breastfeeding No  
 BMI 20.44 kg/m2 No intake or output data in the 24 hours ending 09/08/18 1349 Physical Examination:  
     
Constitutional:  uncooperative an agitated ENT:  Oral mucous membranes dry Resp:  No accessory muscle use and on RA Neurologic:  Moves all extremities. Would not participate in exam to assess orientation Skin: See below for pictures.  Has reddened sacrum, posterior thighs and buttocks - does appear rash like but could be due to moisture or yeast, Scattered bruises and scabbed areas/skin tears to arms and legs. Large scabbed wound to left lateral foot 9/7:   
        
L Arm                                                                 L foot            Bilateral legs            Sacrum Data Review:  
Review and/or order of clinical lab test 
Review and/or order of tests in the radiology section of [facility] Review and/or order of tests in the medicine section of [facility] Labs:  
 
Recent Labs  
   09/08/18 0446  09/07/18 
 1130 WBC  8.8  8.5 HGB  10.2*  10.3* HCT  33.4*  33.3*  
PLT  438*  455* Recent Labs  
   09/08/18 
 0446  09/07/18 
 1130 NA  140  138  
K  5.1  4.3 CL  106  104 CO2  23  28 BUN  12  14 CREA  0.78  0.70 GLU  132*  133* CA  9.2  9.2 No results for input(s): SGOT, GPT, ALT, AP, TBIL, TBILI, TP, ALB, GLOB, GGT, AML, LPSE in the last 72 hours. No lab exists for component: AMYP, HLPSE No results for input(s): INR, PTP, APTT in the last 72 hours. No lab exists for component: INREXT, INREXT No results for input(s): FE, TIBC, PSAT, FERR in the last 72 hours. Lab Results Component Value Date/Time Folate 14.9 03/04/2015 02:49 PM  
  
No results for input(s): PH, PCO2, PO2 in the last 72 hours. No results for input(s): CPK, CKNDX, TROIQ in the last 72 hours. No lab exists for component: CPKMB No results found for: CHOL, CHOLX, CHLST, CHOLV, HDL, LDL, LDLC, DLDLP, TGLX, TRIGL, TRIGP, CHHD, CHHDX Lab Results Component Value Date/Time Glucose (POC) 220 (H) 09/07/2018 08:26 PM  
 Glucose (POC) 167 (H) 09/07/2018 04:37 PM  
 Glucose (POC) 141 (H) 09/07/2018 11:39 AM  
 Glucose (POC) 196 (H) 02/24/2017 04:03 PM  
 Glucose (POC) 120 (H) 04/15/2014 09:50 AM  
 
Lab Results Component Value Date/Time  Color YELLOW/STRAW 09/06/2018 07:11 PM  
 Appearance CLOUDY (A) 09/06/2018 07:11 PM  
 Specific gravity 1.015 09/06/2018 07:11 PM  
 pH (UA) 5.5 09/06/2018 07:11 PM  
 Protein NEGATIVE  09/06/2018 07:11 PM  
 Glucose NEGATIVE  09/06/2018 07:11 PM  
 Ketone NEGATIVE  09/06/2018 07:11 PM  
 Bilirubin NEGATIVE  09/06/2018 07:11 PM  
 Urobilinogen 0.2 09/06/2018 07:11 PM  
 Nitrites POSITIVE (A) 09/06/2018 07:11 PM  
 Leukocyte Esterase LARGE (A) 09/06/2018 07:11 PM  
 Epithelial cells FEW 09/06/2018 07:11 PM  
 Bacteria 1+ (A) 09/06/2018 07:11 PM  
 WBC  09/06/2018 07:11 PM  
 RBC 5-10 09/06/2018 07:11 PM  
 
Medications Reviewed:  
 
Current Facility-Administered Medications Medication Dose Route Frequency  vancomycin (VANCOCIN) 750 mg in 0.9% sodium chloride 250 mL  750 mg IntraVENous Q16H  
 amiodarone (CORDARONE) tablet 200 mg  200 mg Oral EVERY OTHER DAY  cholecalciferol (VITAMIN D3) tablet 2,000 Units  2,000 Units Oral DAILY  gabapentin (NEURONTIN) capsule 800 mg  800 mg Oral PCL  gabapentin (NEURONTIN) capsule 1,200 mg  1,200 mg Oral QHS  LORazepam (ATIVAN) tablet 0.5 mg  0.5 mg Oral QHS PRN  polyvinyl alcohol (LIQUIFILM TEARS) 1.4 % ophthalmic solution 1 Drop  1 Drop Both Eyes PRN  propranolol (INDERAL) tablet 20 mg  20 mg Oral QHS  sodium chloride (NS) flush 5-10 mL  5-10 mL IntraVENous Q8H  
 sodium chloride (NS) flush 5-10 mL  5-10 mL IntraVENous PRN  
 cefTRIAXone (ROCEPHIN) 1 g in 0.9% sodium chloride (MBP/ADV) 50 mL  1 g IntraVENous Q24H  Vancomycin Pharmacy Dosing   Other Rx Dosing/Monitoring  glucose chewable tablet 16 g  4 Tab Oral PRN  
 dextrose (D50W) injection syrg 12.5-25 g  25-50 mL IntraVENous PRN  
 glucagon (GLUCAGEN) injection 1 mg  1 mg IntraMUSCular PRN  
 insulin lispro (HUMALOG) injection   SubCUTAneous AC&HS  miconazole (SECURA) 2 % extra thick cream   Topical BID  QUEtiapine (SEROquel) tablet 12.5 mg  12.5 mg Oral BID  QUEtiapine (SEROquel) tablet 12.5 mg  12.5 mg Oral Q8H PRN  
 ziprasidone (GEODON) 5 mg in sterile water (preservative free) 0.25 mL injection  5 mg IntraMUSCular Q12H PRN  
______________________________________________________________________ EXPECTED LENGTH OF STAY: 3d 0h 
ACTUAL LENGTH OF STAY:          2 Max Jones NP

## 2018-09-08 NOTE — PROGRESS NOTES
In response to code query, the medical record reflects the following clinical findings, treatment, and risk factors. Risk Factors:  hx dementia Clinical Indicators:  per H&P:UTI and Aggressive behavior wi th agitation, refusing IV access and yelling out Treatment: Rocephin, lab monitoring, Psych consult, Ativan PRN This patient is being treated/managed for:  
 
=> Metabolic encephalopathy in the setting of UTI with known dementia requiring IV antibiotics, lab monitoring, Psychiatric consult

## 2018-09-08 NOTE — PROGRESS NOTES
Bedside and Verbal shift change report given to Migdalia (oncoming nurse) by Tierra Ruano (offgoing nurse). Report included the following information SBAR, Kardex, Intake/Output and MAR.

## 2018-09-09 NOTE — PROGRESS NOTES
Bedside shift change report given to [address] [place] [address] Drive (oncoming nurse) by Ana Laura Wagoner RN (offgoing nurse). Report included the following information SBAR, Kardex, Intake/Output and MAR.  
 
0309 - pt refusing lab work; will reattempt

## 2018-09-09 NOTE — PROGRESS NOTES
Bedside shift change report given to [address] (oncoming nurse) by Paulette Greene RN (offgoing nurse). Report included the following information SBAR.

## 2018-09-09 NOTE — PROGRESS NOTES
Hospitalist Progress Note Lina Mendoza NP Answering service: [phone] OR [phone] from in house phone Cell: [phone] Date of Service:  2018 NAME:  Sandrita Goel :  10/17/1932 MRN:  [id number] Admission Summary: Pt presented to the ED with a swollen, red and painful foot. She had been extremely anxious, agitated, combative and uncooperative upon arrival to the ED. According to reports, she had been sent to her PCP office for combative behavior. Pmhx: dementia, Parkinson disease, type 2 diabetes mellitus, chronic pain, peripheral neuropathy Interval history / Subjective:  
Pt sitting up in chair - has a fairly tania demeanor but is cooperative. She indicates that she wants to go back to [facility] [place] instead of where she was previously. Assessment & Plan:  
 
Urinary tract infection:   
- UA suspicious for UTI 
- Has received rocephin x 2 doses - now no IV 
- Urine culture with >100,000 mixed urogenital dante 
- yeast noted in urine - not evident on culture. - will plan on keflex coverage for UTI and cellulitis Cellulitis/nonhealing L foot wound: - Left foot 3-view x-ray showed nonspecific soft tissue swelling with no fx or OM noted - Podiatry consulted - he has seen (though pt not cooperative) and does not believe this to be a surgical issue, continue with wound care. - had 1 dose of IV vancomycin and now no IV. - treat with kefllex - she is now taking po 
- pain: tylenol prn Leukocytosis: resolved now Hx Type 2 diabetes mellitus with hyperglycemia, uncontrolled:  
- HgbA1c 6.3 
- accuchecks ordered with SSI. Blood glucose 138-220 
- on carbohydrate controlled diet Rash with superimposed cellulitis:  
- buttock/sacral/thigh redness 
- wound care has seen, agree with their recommendations Metabolic Encephalopathy with agressive behavior and agitation and a hx of dementia:   
 - could be exacerbated by UTI/cellulitis 
- was initially sent to ED for TDO eval 
- psychiatry has seen and ordered meds 
- has been prescribed seroquel (scheduled) and geodon (prn) Hx Parkinson disease:   
- nortriptyline, galantamine, propranalol at home Code status: Full DVT prophylaxis: SCDs Care Plan discussed with: nursing Disposition: to be determined - pt wants to go to a different  Assisted Living on discharge. Consult sent to CM with specific pt request.   
 
Hospital Problems  Date Reviewed: 9/6/2018 Codes Class Noted POA Leukocytosis ICD-10-CM: T49.737 ICD-9-CM: 288.60  9/6/2018 Unknown Agitation ICD-10-CM: R45.1 ICD-9-CM: 307.9  9/6/2018 Unknown * (Principal)UTI (urinary tract infection) ICD-10-CM: N39.0 ICD-9-CM: 599.0  9/6/2018 Unknown Cellulitis of left foot ICD-10-CM: L03.116 ICD-9-CM: 682.7  9/6/2018 Unknown Cellulitis of multiple sites of buttock ICD-10-CM: L03.317 ICD-9-CM: 682.5  9/6/2018 Unknown Dementia with behavioral disturbance ICD-10-CM: F03.91 
ICD-9-CM: 294.21  9/6/2018 Unknown Review of Systems:  
Pt denies pain, no shortness of breath. Eating fairly and has a fair appetite. Vital Signs:  
 Last 24hrs VS reviewed since prior progress note. Most recent are: 
Visit Vitals  BP 93/46 (BP 1 Location: Right arm, BP Patient Position: At rest)  Pulse 61  Temp 98.3 °F (36.8 °C)  Resp 18  Ht 5' 2\" (1.575 m)  Wt 50.7 kg (111 lb 12.4 oz)  SpO2 97%  Breastfeeding No  
 BMI 20.44 kg/m2 No intake or output data in the 24 hours ending 09/09/18 0919 Physical Examination:  
     
Constitutional:  Elderly female in no acute distress ENT:  Oral mucous membranes moist   
Resp:  No accessory muscle use and on RA Neurologic:  Moves all extremities. Oriented to person, place Extremities: Scattered bruises, skin tears. Mild 1+ pitting edema in BLE (feet) Skin: See below for pictures. Has reddened sacrum, posterior thighs and buttocks - does appear rash like but could be due to moisture or yeast (seen 9/7) Scattered bruises and scabbed areas/skin tears to arms and legs. Large scabbed wound to left lateral foot. 9/9: dressing changes observed while pt up in chair but did not take updated pictures. 9/7:   
        
L Arm                                                                 L foot            Bilateral legs            Sacrum Data Review:  
Review and/or order of clinical lab test 
Review and/or order of tests in the radiology section of [facility] Review and/or order of tests in the medicine section of [facility] Labs:  
 
Recent Labs  
   09/08/18 0446 09/07/18 
 1130 WBC  8.8  8.5 HGB  10.2*  10.3* HCT  33.4*  33.3*  
PLT  438*  455* Recent Labs  
   09/08/18 0446  09/07/18 
 1130 NA  140  138  
K  5.1  4.3 CL  106  104 CO2  23  28 BUN  12  14 CREA  0.78  0.70 GLU  132*  133* CA  9.2  9.2 No results for input(s): SGOT, GPT, ALT, AP, TBIL, TBILI, TP, ALB, GLOB, GGT, AML, LPSE in the last 72 hours. No lab exists for component: AMYP, HLPSE No results for input(s): INR, PTP, APTT in the last 72 hours. No lab exists for component: INREXT, INREXT No results for input(s): FE, TIBC, PSAT, FERR in the last 72 hours. Lab Results Component Value Date/Time Folate 14.9 03/04/2015 02:49 PM  
  
No results for input(s): PH, PCO2, PO2 in the last 72 hours. No results for input(s): CPK, CKNDX, TROIQ in the last 72 hours. No lab exists for component: CPKMB No results found for: CHOL, CHOLX, CHLST, CHOLV, HDL, LDL, LDLC, DLDLP, TGLX, TRIGL, TRIGP, CHHD, CHHDX Lab Results Component Value Date/Time  Glucose (POC) 138 (H) 09/09/2018 06:42 AM  
 Glucose (POC) 156 (H) 09/08/2018 08:39 PM  
 Glucose (POC) 220 (H) 09/07/2018 08:26 PM  
 Glucose (POC) 167 (H) 09/07/2018 04:37 PM  
 Glucose (POC) 141 (H) 09/07/2018 11:39 AM  
 
 Lab Results Component Value Date/Time Color YELLOW/STRAW 09/06/2018 07:11 PM  
 Appearance CLOUDY (A) 09/06/2018 07:11 PM  
 Specific gravity 1.015 09/06/2018 07:11 PM  
 pH (UA) 5.5 09/06/2018 07:11 PM  
 Protein NEGATIVE  09/06/2018 07:11 PM  
 Glucose NEGATIVE  09/06/2018 07:11 PM  
 Ketone NEGATIVE  09/06/2018 07:11 PM  
 Bilirubin NEGATIVE  09/06/2018 07:11 PM  
 Urobilinogen 0.2 09/06/2018 07:11 PM  
 Nitrites POSITIVE (A) 09/06/2018 07:11 PM  
 Leukocyte Esterase LARGE (A) 09/06/2018 07:11 PM  
 Epithelial cells FEW 09/06/2018 07:11 PM  
 Bacteria 1+ (A) 09/06/2018 07:11 PM  
 WBC  09/06/2018 07:11 PM  
 RBC 5-10 09/06/2018 07:11 PM  
 
Medications Reviewed:  
 
Current Facility-Administered Medications Medication Dose Route Frequency  cephALEXin (KEFLEX) capsule 500 mg  500 mg Oral Q12H  
 amiodarone (CORDARONE) tablet 200 mg  200 mg Oral EVERY OTHER DAY  cholecalciferol (VITAMIN D3) tablet 2,000 Units  2,000 Units Oral DAILY  gabapentin (NEURONTIN) capsule 800 mg  800 mg Oral PCL  gabapentin (NEURONTIN) capsule 1,200 mg  1,200 mg Oral QHS  LORazepam (ATIVAN) tablet 0.5 mg  0.5 mg Oral QHS PRN  polyvinyl alcohol (LIQUIFILM TEARS) 1.4 % ophthalmic solution 1 Drop  1 Drop Both Eyes PRN  propranolol (INDERAL) tablet 20 mg  20 mg Oral QHS  sodium chloride (NS) flush 5-10 mL  5-10 mL IntraVENous Q8H  
 sodium chloride (NS) flush 5-10 mL  5-10 mL IntraVENous PRN  
 glucose chewable tablet 16 g  4 Tab Oral PRN  
 dextrose (D50W) injection syrg 12.5-25 g  25-50 mL IntraVENous PRN  
 glucagon (GLUCAGEN) injection 1 mg  1 mg IntraMUSCular PRN  
 insulin lispro (HUMALOG) injection   SubCUTAneous AC&HS  miconazole (SECURA) 2 % extra thick cream   Topical BID  QUEtiapine (SEROquel) tablet 12.5 mg  12.5 mg Oral BID  QUEtiapine (SEROquel) tablet 12.5 mg  12.5 mg Oral Q8H PRN  
 ziprasidone (GEODON) 5 mg in sterile water (preservative free) 0.25 mL injection  5 mg IntraMUSCular Q12H PRN  
______________________________________________________________________ EXPECTED LENGTH OF STAY: 3d 0h 
ACTUAL LENGTH OF STAY:          3 Miguel Talley NP

## 2018-09-09 NOTE — PROGRESS NOTES
Bedside and Verbal shift change report given to Migdalia (oncoming nurse) by Elvi Hernandez (offgoing nurse). Report included the following information SBAR, Kardex, Intake/Output and MAR.  
1655: Spoke with granddaughter Gricelda Colesch.

## 2018-09-10 NOTE — PROGRESS NOTES
Problem: Falls - Risk of 
Goal: *Absence of Falls Document Tia Manus Fall Risk and appropriate interventions in the flowsheet. Outcome: Progressing Towards Goal 
Fall Risk Interventions: 
Mobility Interventions: Communicate number of staff needed for ambulation/transfer, Patient to call before getting OOB Mentation Interventions: Adequate sleep, hydration, pain control, Door open when patient unattended, Evaluate medications/consider consulting pharmacy, Increase mobility, More frequent rounding, Reorient patient, Room close to nurse's station, Toileting rounds, Update white board Medication Interventions: Evaluate medications/consider consulting pharmacy, Patient to call before getting OOB, Teach patient to arise slowly Elimination Interventions: Call light in reach, Patient to call for help with toileting needs, Toileting schedule/hourly rounds History of Falls Interventions: Evaluate medications/consider consulting pharmacy, Investigate reason for fall, Room close to nurse's station

## 2018-09-10 NOTE — PROGRESS NOTES
1000: PCT/sitter (Laurita George) attempted to put pt.'s legs back into bed because they were hanging off of the right right side of the bed, but pt refused. 1015: PCT/mariangel George) and Nurse Manager (Juan Luis Gonzalez) attempted to put pt.'s legs back into bed, pt. refused again. Nurse Manager attempted to put pillow between knee and side rail, pt. removed pillow.

## 2018-09-10 NOTE — ADVANCED PRACTICE NURSE
Sleep disorders:Patient states she slept  some up in the recliner and then in the bed last night. She states she believes she got confused about why she was at the hospital early this morning. She states she was frightened when there were a lot of people in her room and she begin to swing out to protect herself. \"My vision is poor and it scares me to not be able to see clearly. I am sorry for acting out. \"   
 
Problems with eating or feeding: Nurses report she has not eaten much today. She accepted some cranberry/orange juice and peanut butter crackers currently. Awaiting supper. Incontinence: Patient getting up to BR with 2 assistance and rollator. Hospital underwear uncomfortable so incontinent brief placed on. Confusion:  I got confused this morning and didn't know why I was at the hospital. Sitter at the bedside. Patient knows name and that she is at [facility].  
 
Evidence of Falls:  Festus score 5. Skin breakdown:  Nathanael score 17. Plan:Calm, supportive safe environment provided to build trust. Offer time and explanations for all procedures (ie medication administration, nutrition and hydration, etc.). Patient's vision is poor and hearing diminished. Patient describes being frightened and feeling loss of independence. Patient agreed to obtaining VS and getting EKG. Kendal Wray RN, MSN, CNS-BC, Gerontology [phone]

## 2018-09-10 NOTE — PROGRESS NOTES
Hospitalist Progress Note Srinivas Muller NP Answering service: [phone] OR [phone] from in house phone Date of Service:  9/10/2018 NAME:  Kaylee Cervantes :  10/17/1932 MRN:  [id number] Admission Summary: Pt presented to the ED with a swollen, red and painful foot. She had been extremely anxious, agitated, combative and uncooperative upon arrival to the ED. According to reports, she had been sent to her PCP office for combative behavior. Pmhx: dementia, Parkinson disease, type 2 diabetes mellitus, chronic pain, peripheral neuropathy Interval history / Subjective:  
Pt not cooperative, agitated, verbally aggressive, paranoid Increase seroquel to 25 mg bid, Geodon now. Continue with sitter Will try to obtain ekg once she is calmer and evaluate QTc Disposition is problematic with this behavior, discussed with CM Pt has refused VS and any care today Pt not very cooperative with physical exam, unable to exam all wounds due to aggression Assessment & Plan:  
 
Urinary tract infection:   
- suspicious for UTI on admission 
- ucx with > 100 K of mixed urogenital dante 
- yeast on UA, none on ucx 
- rcvd rocephin x 2 doses, IV access is lost, complete therapy on Keflex for this and cellulitis Cellulitis/nonhealing L foot wound: - Left foot 3-view x-ray showed nonspecific soft tissue swelling with no fx or OM noted - Podiatry consulted - he has seen (though pt not cooperative) and does not believe this to be a surgical issue, continue with wound care. - had 1 dose of IV vancomycin and now no IV 
- treat with kefllex - she is now taking po 
- pain: tylenol prn Leukocytosis: Resolved Hx Type 2 diabetes mellitus with hyperglycemia, uncontrolled:  
- HgbA1c 6.3 
- accuchecks ordered with SSI. Blood glucose 138-220 
- on carbohydrate controlled diet Rash with superimposed cellulitis:  
- buttock/sacral/thigh redness - wound care has seen, agree with their recommendations Metabolic Encephalopathy with agressive behavior and agitation and a hx of dementia:   
- could be exacerbated by UTI/cellulitis 
- was initially sent to ED for TDO eval 
- psychiatry has seen and ordered meds 
- has been prescribed seroquel (scheduled) and geodon (prn) Hx Parkinson disease:   
- nortriptyline, galantamine, propranalol at home Code status: Full DVT prophylaxis: SCDs Care Plan discussed with: nursing Disposition: to be determined - pt wants to go to a different  Assisted Living on discharge. Consult sent to CM with specific pt request.   
 
Hospital Problems  Date Reviewed: 9/6/2018 Codes Class Noted POA Leukocytosis ICD-10-CM: W26.811 ICD-9-CM: 288.60  9/6/2018 Unknown Agitation ICD-10-CM: R45.1 ICD-9-CM: 307.9  9/6/2018 Unknown * (Principal)UTI (urinary tract infection) ICD-10-CM: N39.0 ICD-9-CM: 599.0  9/6/2018 Unknown Cellulitis of left foot ICD-10-CM: L03.116 ICD-9-CM: 682.7  9/6/2018 Unknown Cellulitis of multiple sites of buttock ICD-10-CM: L03.317 ICD-9-CM: 682.5  9/6/2018 Unknown Dementia with behavioral disturbance ICD-10-CM: F03.91 
ICD-9-CM: 294.21  9/6/2018 Unknown Review of Systems:  
Unable to obtain due to pt's agitation and aggression Vital Signs:  
 Last 24hrs VS reviewed since prior progress note. Most recent are: 
Visit Vitals  BP 95/67 (BP 1 Location: Right arm, BP Patient Position: At rest;Sitting)  Pulse 63  Temp 97.9 °F (36.6 °C)  Resp 18  Ht 5' 2\" (1.575 m)  Wt 50.7 kg (111 lb 12.4 oz)  SpO2 98%  Breastfeeding No  
 BMI 20.44 kg/m2 Intake/Output Summary (Last 24 hours) at 09/10/18 1434 Last data filed at 09/09/18 8386 Gross per 24 hour Intake                0 ml Output              400 ml Net             -400 ml Physical Examination:  
     
Constitutional:  Elderly female in moderate distress ENT:  Oral mucous membranes moist   
Resp:  No accessory muscle use and on RA Neurologic:  Moves all extremities. Oriented to person, place Extremities: Scattered bruises, skin tears. Mild 1+ pitting edema in BLE (feet) Skin: See below for pictures. Has reddened sacrum, posterior thighs and buttocks - does appear rash like but could be due to moisture or yeast (seen 9/7) Scattered bruises and scabbed areas/skin tears to arms and legs. Large scabbed wound to left lateral foot. 9/9: dressing changes observed while pt up in chair but did not take updated pictures. 9/7:   
        
L Arm                                                                 L foot            Bilateral legs            Sacrum Data Review:  
Review and/or order of clinical lab test 
Review and/or order of tests in the radiology section of CPT Review and/or order of tests in the medicine section of CPT Labs:  
 
Recent Labs  
   09/08/18 
 1527 WBC  8.8 HGB  10.2* HCT  33.4*  
PLT  438* Recent Labs  
   09/08/18 
 0446 NA  140  
K  5.1 CL  106 CO2  23 BUN  12  
CREA  0.78 GLU  132* CA  9.2 No results for input(s): SGOT, GPT, ALT, AP, TBIL, TBILI, TP, ALB, GLOB, GGT, AML, LPSE in the last 72 hours. No lab exists for component: AMYP, HLPSE No results for input(s): INR, PTP, APTT in the last 72 hours. No lab exists for component: INREXT, INREXT No results for input(s): FE, TIBC, PSAT, FERR in the last 72 hours. Lab Results Component Value Date/Time Folate 14.9 03/04/2015 02:49 PM  
  
No results for input(s): PH, PCO2, PO2 in the last 72 hours. No results for input(s): CPK, CKNDX, TROIQ in the last 72 hours. No lab exists for component: CPKMB No results found for: CHOL, CHOLX, CHLST, CHOLV, HDL, LDL, LDLC, DLDLP, TGLX, TRIGL, TRIGP, CHHD, CHHDX Lab Results Component Value Date/Time  Glucose (POC) 170 (H) 09/10/2018 07:10 AM  
 Glucose (POC) 166 (H) 09/09/2018 09:30 PM  
 Glucose (POC) 178 (H) 09/09/2018 04:09 PM  
 Glucose (POC) 135 (H) 09/09/2018 11:14 AM  
 Glucose (POC) 138 (H) 09/09/2018 06:42 AM  
 
Lab Results Component Value Date/Time Color YELLOW/STRAW 09/06/2018 07:11 PM  
 Appearance CLOUDY (A) 09/06/2018 07:11 PM  
 Specific gravity 1.015 09/06/2018 07:11 PM  
 pH (UA) 5.5 09/06/2018 07:11 PM  
 Protein NEGATIVE  09/06/2018 07:11 PM  
 Glucose NEGATIVE  09/06/2018 07:11 PM  
 Ketone NEGATIVE  09/06/2018 07:11 PM  
 Bilirubin NEGATIVE  09/06/2018 07:11 PM  
 Urobilinogen 0.2 09/06/2018 07:11 PM  
 Nitrites POSITIVE (A) 09/06/2018 07:11 PM  
 Leukocyte Esterase LARGE (A) 09/06/2018 07:11 PM  
 Epithelial cells FEW 09/06/2018 07:11 PM  
 Bacteria 1+ (A) 09/06/2018 07:11 PM  
 WBC  09/06/2018 07:11 PM  
 RBC 5-10 09/06/2018 07:11 PM  
 
Medications Reviewed:  
 
Current Facility-Administered Medications Medication Dose Route Frequency  cephALEXin (KEFLEX) capsule 500 mg  500 mg Oral Q12H  
 amiodarone (CORDARONE) tablet 200 mg  200 mg Oral EVERY OTHER DAY  cholecalciferol (VITAMIN D3) tablet 2,000 Units  2,000 Units Oral DAILY  gabapentin (NEURONTIN) capsule 800 mg  800 mg Oral PCL  gabapentin (NEURONTIN) capsule 1,200 mg  1,200 mg Oral QHS  LORazepam (ATIVAN) tablet 0.5 mg  0.5 mg Oral QHS PRN  polyvinyl alcohol (LIQUIFILM TEARS) 1.4 % ophthalmic solution 1 Drop  1 Drop Both Eyes PRN  propranolol (INDERAL) tablet 20 mg  20 mg Oral QHS  sodium chloride (NS) flush 5-10 mL  5-10 mL IntraVENous Q8H  
 sodium chloride (NS) flush 5-10 mL  5-10 mL IntraVENous PRN  
 glucose chewable tablet 16 g  4 Tab Oral PRN  
 dextrose (D50W) injection syrg 12.5-25 g  25-50 mL IntraVENous PRN  
 glucagon (GLUCAGEN) injection 1 mg  1 mg IntraMUSCular PRN  
 insulin lispro (HUMALOG) injection   SubCUTAneous AC&HS  miconazole (SECURA) 2 % extra thick cream   Topical BID  QUEtiapine (SEROquel) tablet 12.5 mg  12.5 mg Oral BID  
  QUEtiapine (SEROquel) tablet 12.5 mg  12.5 mg Oral Q8H PRN  
 ziprasidone (GEODON) 5 mg in sterile water (preservative free) 0.25 mL injection  5 mg IntraMUSCular Q12H PRN  
______________________________________________________________________ EXPECTED LENGTH OF STAY: 3d 0h 
ACTUAL LENGTH OF STAY:          4 Amy Britton NP

## 2018-09-10 NOTE — PROGRESS NOTES
Patient awoke agitated, trying to leave room, swinging at this nurse and night shift charge and sitter/PCT; Geodon administered; present were 4 RN's and sitter PCT. Patient swung right fist at [facility], [place] and patient's elbow hit bed rail on right side of bed tearing her skin. Bandage applied. @5395:  Patient refused to let nurse get vitals. Will try again later. @5214:  Patient refused vitals; attempted to cover with blanket as legs are exposed; patient removed blanket; patient refusing to let nurse or tech check her brief, which is not saturated but is wet. Notified NP Elisa Carias. @26:  Leanne HULL attempted to changed patients brief with PCT/sitter Tray; patient would not allow. @1500:  Patient visiting with Gama Cruz., rafatr much calmer; patient allowed this nurse to assist with bedside commode; agreed to EKG; no Geodon needed (wasted in sharps container in med. Room). Patient was offered & accepted oj/cranberry juice (her favorite) and peanut butter crackers.

## 2018-09-10 NOTE — ADT AUTH CERT NOTES
Per message from nurse: 
 
Please let [employer] know we are not downgrading to obs. Please send 9/10 clinical thanks! 
 
 
  Patient Demographics     
  Patient Name [address] Sex  Address Phone    
  Sukhi Tamie [phone] Female 10/17/1932 [address] 
apt [phone] [address] [phone] (Mobile)    
   
  CSN:    
  [id number]    
   
  Admit Date: Admit Time Room Bed    
  Sep 6, 2018  2:09  [[id number]] 01 [[id number]]    
   
  Attending Providers     
  Provider Pager From To    
  Christiano Olivera MD  18    
  Rianna Ramos MD  18    
  Greg Staples MD  09/07/18 09/10/18    
  Edison Duncan MD  09/10/18     
   
  Emergency Contact(s)     
  Name Relation Home Work Mobile    
  No name specified        
   
Utilization Review  
  
  
  Cellulitis - Care Day 5 (9/10/2018) by Pepper Gorman RN     
  Review Entered Review Status    
  9/10/2018 Completed    
  Details    
      
  Care Day: 5 Care Date: 9/10/2018 Level of Care: Inpatient Floor    
  Guideline Day 2     
  Clinical Status    
  (X) * Dehydration absent    
  ( ) * Mental status at baseline    
  9/10/2018 3:15 PM EDT by Dustin Pulliam    
    Metabolic Encephalopathy with agressive behavior and agitation and a hx of dementia    
      
  (X) * Hemodynamic stability    
  (X) * Fever absent or improved    
      
  Routes    
  (X) * Oral hydration, medications    
      
      
      
      
  * Milestone    
      
  Additional Notes    
  Interval history / Subjective:    
  Pt not cooperative, agitated, verbally aggressive, paranoid    
  Increase seroquel to 25 mg bid, Geodon now.  Continue with sitter    
  Will try to obtain ekg once she is calmer and evaluate QTc    
  Disposition is problematic with this behavior, discussed with CM    
  Pt has refused VS and any care today    
  Pt not very cooperative with physical exam, unable to exam all wounds due to aggression    
       
   Assessment & Plan:    
       
  Urinary tract infection:      
  - suspicious for UTI on admission    
  - ucx with > 100 K of mixed urogenital dante    
  - yeast on UA, none on ucx    
  - rcvd rocephin x 2 doses, IV access is lost, complete therapy on Keflex for this and cellulitis    
       
  Cellulitis/nonhealing L foot wound:    
  - Left foot 3-view x-ray showed nonspecific soft tissue swelling with no fx or OM noted    
  - Podiatry consulted - he has seen (though pt not cooperative) and does not believe this to be a surgical issue, continue with wound care.     
  - had 1 dose of IV vancomycin and now no IV    
  - treat with kefllex - she is now taking po    
  - pain: tylenol prn    
       
  Leukocytosis: Resolved     
       
  Hx Type 2 diabetes mellitus with hyperglycemia, uncontrolled:     
  - HgbA1c 6.3    
  - accuchecks ordered with SSI. Blood glucose 138-220    
  - on carbohydrate controlled diet    
       
  Rash with superimposed cellulitis:     
  - buttock/sacral/thigh redness    
  - wound care has seen, agree with their recommendations    
       
  Metabolic Encephalopathy with agressive behavior and agitation and a hx of dementia:      
  - could be exacerbated by UTI/cellulitis    
  - was initially sent to ED for TDO eval    
  - psychiatry has seen and ordered meds    
  - has been prescribed seroquel (scheduled) and geodon (prn)    
       
  Hx Parkinson disease:      
  - nortriptyline, galantamine, propranalol at home    
       
  Code status: Full    
  DVT prophylaxis: SCDs    
  Care Plan discussed with: nursing    
  Disposition: to be determined - pt wants to go to a different  Assisted Living on discharge.  Consult sent to CM with specific pt request.     
       
      
  LABS:     
  MEDS: KEFLEX PO Q12H     
  VITALS: T97.9, P 63, RR 18, BP 95/57, 98% RA

## 2018-09-11 NOTE — PROGRESS NOTES
Representatives from [facility] [place] will visit to assess patient Wednesday 9/12/18 between 11:15a and 11:30am. CM asked that document be faxed to [address] [place] [address] again. Will continue to follow. INGRID Quinn, CRM 
 
TITI met with liaison Emmy Mccain) from [address] [place] [address] Dr. Enriqueta Acosta was introduced to the patient and they had a short visit. Patient appeared agitated during her conversation with Enriqueta Acosta. Facility states that based on chart review and visit with the patient, that a 24 hr sitter(s) would be required for patient to be admitted to the facility. 1:42p  CM had a conference call with the patient's grand daughter Ji Cruz ([facility]) and the Senior Care Specialist with [facility] ([facility] [phone] cell/ [phone] office/ [phone] fax). Ashwini had been contacted by [address] [place], [place] [address]nes [place] and was aware of concerns re the patient's behaviors and requirement for 24hr cargivers for patient to be approved for admission. Discussion took place re medication management and both were informed that patient has seroquel 25mg added as of 9/10/18. Both feel that adjustment to meds will render positive outcome and patient could be admitted to [address] [place] [address] once behaviors are under control. TITI did receive the physical assessment via fax earlierr today. Patient will need orders for a CXR as the last CXR was June 2018. [facility] also proposed the option of patient being a better fit for a pvt home with less residents and staff. Ashwini was open to all options available to provide a safe and caring environment for her grandmother. CM will continue to follow.   INGRID Quinn, CRM

## 2018-09-11 NOTE — PROGRESS NOTES
Bedside shift change report given to Juan F Dubon, [facility] (oncoming nurse) by Imer English RN (offgoing nurse). Report included the following information SBAR, Kardex, ED Summary, [facility] and Recent Results.

## 2018-09-11 NOTE — DIABETES MGMT
DTC Progress Note Recommendations/ Comments: Chart review for variable BG's ranging 134 mg/dL - 253 mg/dL the past 24 hours. Document po intake If appropriate, please consider  
-addition of Januvia 50 mg daily Current hospital DM medication: lispro correction scale, normal sensitivity Chart reviewed on Lilliam Ventura. Patient is a 80 y.o. female with known DM on no DM meds PTA. A1c:  
Lab Results Component Value Date/Time Hemoglobin A1c 6.3 09/08/2018 04:46 AM  
 Hemoglobin A1c 6.3 09/07/2018 11:30 AM  
 
 
Recent Glucose Results:  
Lab Results Component Value Date/Time GLUCPOC 200 (H) 09/11/2018 11:48 AM  
 GLUCPOC 134 (H) 09/11/2018 06:50 AM  
 GLUCPOC 211 (H) 09/10/2018 09:37 PM  
  
 
Lab Results Component Value Date/Time Creatinine 0.78 09/08/2018 04:46 AM  
 
Estimated Creatinine Clearance: 41.7 mL/min (based on Cr of 0.78). Active Orders Diet DIET DIABETIC CONSISTENT CARB Regular PO intake: No data found. Will continue to follow as needed. Thank you Brittney Hollins RN, CDE

## 2018-09-11 NOTE — PROGRESS NOTES
Hospitalist Progress Note Argenis Maya NP Answering service: [phone] OR [phone] from in house phone Date of Service:  2018 NAME:  Elizabeth Savage :  10/17/1932 MRN:  [id number] Admission Summary: Pt presented to the ED with a swollen, red and painful foot. She had been extremely anxious, agitated, combative and uncooperative upon arrival to the ED. According to reports, she had been sent to her PCP office for combative behavior. Pmhx: dementia, Parkinson disease, type 2 diabetes mellitus, chronic pain, peripheral neuropathy Interval history / Subjective:  
Pt continues with sitter, a little more cooperative today with increased seroquel. Apologetic to nursing staff for yesterday's behavior. Disposition is difficult, CM working on this. Januvia added Assessment & Plan:  
 
Urinary tract infection:   
- suspicious for UTI on admission 
- ucx with > 100 K of mixed urogenital dante 
- yeast on UA, none on ucx 
- rcvd rocephin x 2 doses, IV access is lost, complete therapy on Keflex for this and cellulitis Cellulitis/nonhealing L foot wound: - Left foot 3-view x-ray showed nonspecific soft tissue swelling with no fx or OM noted - Podiatry consulted - he has seen (though pt not cooperative) and does not believe this to be a surgical issue, continue with wound care. - had 1 dose of IV vancomycin and now no IV 
- treat with kefllex - she is now taking po 
- pain: tylenol prn Leukocytosis: Resolved Hx Type 2 diabetes mellitus with hyperglycemia, uncontrolled:  
- HgbA1c 6.3 
- accuchecks ordered with SSI. Blood glucose 138-220 
- on carbohydrate controlled diet -  Januvia added Rash with superimposed cellulitis:  
- buttock/sacral/thigh redness 
- wound care has seen, agree with their recommendations Metabolic Encephalopathy with agressive behavior and agitation and a hx of dementia:   
 - could be exacerbated by UTI/cellulitis 
- was initially sent to ED for TDO eval 
- psychiatry has seen and ordered meds 
- has been prescribed seroquel (scheduled) and geodon (prn) Hx Parkinson disease:   
- nortriptyline, galantamine, propranalol at home Code status: Full DVT prophylaxis: SCDs Care Plan discussed with: nursing Disposition: to be determined - pt wants to go to a different  Assisted Living on discharge. Consult sent to CM with specific pt request.   
 
Hospital Problems  Date Reviewed: 9/6/2018 Codes Class Noted POA Leukocytosis ICD-10-CM: X60.565 ICD-9-CM: 288.60  9/6/2018 Unknown Agitation ICD-10-CM: R45.1 ICD-9-CM: 307.9  9/6/2018 Unknown * (Principal)UTI (urinary tract infection) ICD-10-CM: N39.0 ICD-9-CM: 599.0  9/6/2018 Unknown Cellulitis of left foot ICD-10-CM: L03.116 ICD-9-CM: 682.7  9/6/2018 Unknown Cellulitis of multiple sites of buttock ICD-10-CM: L03.317 ICD-9-CM: 682.5  9/6/2018 Unknown Dementia with behavioral disturbance ICD-10-CM: F03.91 
ICD-9-CM: 294.21  9/6/2018 Unknown Review of Systems:  
Unable to obtain due to pt's agitation and aggression Vital Signs:  
 Last 24hrs VS reviewed since prior progress note. Most recent are: 
Visit Vitals  /57 (BP 1 Location: Left arm, BP Patient Position: At rest)  Pulse 77  Temp 98.3 °F (36.8 °C)  Resp 14  
 Ht 5' 2\" (1.575 m)  Wt 50.7 kg (111 lb 12.4 oz)  SpO2 100%  Breastfeeding No  
 BMI 20.44 kg/m2 No intake or output data in the 24 hours ending 09/11/18 1633 Physical Examination:  
     
Constitutional:  Elderly female in moderate distress ENT:  Oral mucous membranes moist   
Resp:  No accessory muscle use and on RA Neurologic:  Moves all extremities. Oriented to person, place Extremities: Scattered bruises, skin tears. Mild 1+ pitting edema in BLE (feet) Skin: See below for pictures. Has reddened sacrum, posterior thighs and buttocks - does appear rash like but could be due to moisture or yeast (seen 9/7) Scattered bruises and scabbed areas/skin tears to arms and legs. Large scabbed wound to left lateral foot. 9/9: dressing changes observed while pt up in chair but did not take updated pictures. 9/7:   
        
L Arm                                                                 L foot            Bilateral legs            Sacrum Data Review:  
Review and/or order of clinical lab test 
Review and/or order of tests in the radiology section of CPT Review and/or order of tests in the medicine section of CPT Labs:  
 
No results for input(s): WBC, HGB, HCT, PLT, HGBEXT, HCTEXT, PLTEXT, HGBEXT, HCTEXT, PLTEXT in the last 72 hours. No results for input(s): NA, K, CL, CO2, BUN, CREA, GLU, CA, MG, PHOS, URICA in the last 72 hours. No results for input(s): SGOT, GPT, ALT, AP, TBIL, TBILI, TP, ALB, GLOB, GGT, AML, LPSE in the last 72 hours. No lab exists for component: AMYP, HLPSE No results for input(s): INR, PTP, APTT in the last 72 hours. No lab exists for component: INREXT, INREXT No results for input(s): FE, TIBC, PSAT, FERR in the last 72 hours. Lab Results Component Value Date/Time Folate 14.9 03/04/2015 02:49 PM  
  
No results for input(s): PH, PCO2, PO2 in the last 72 hours. No results for input(s): CPK, CKNDX, TROIQ in the last 72 hours. No lab exists for component: CPKMB No results found for: CHOL, CHOLX, CHLST, CHOLV, HDL, LDL, LDLC, DLDLP, TGLX, TRIGL, TRIGP, CHHD, CHHDX Lab Results Component Value Date/Time Glucose (POC) 150 (H) 09/11/2018 04:14 PM  
 Glucose (POC) 200 (H) 09/11/2018 11:48 AM  
 Glucose (POC) 134 (H) 09/11/2018 06:50 AM  
 Glucose (POC) 211 (H) 09/10/2018 09:37 PM  
 Glucose (POC) 253 (H) 09/10/2018 04:43 PM  
 
Lab Results Component Value Date/Time  Color YELLOW/STRAW 09/06/2018 07:11 PM  
 Appearance CLOUDY (A) 09/06/2018 07:11 PM  
 Specific gravity 1.015 09/06/2018 07:11 PM  
 pH (UA) 5.5 09/06/2018 07:11 PM  
 Protein NEGATIVE  09/06/2018 07:11 PM  
 Glucose NEGATIVE  09/06/2018 07:11 PM  
 Ketone NEGATIVE  09/06/2018 07:11 PM  
 Bilirubin NEGATIVE  09/06/2018 07:11 PM  
 Urobilinogen 0.2 09/06/2018 07:11 PM  
 Nitrites POSITIVE (A) 09/06/2018 07:11 PM  
 Leukocyte Esterase LARGE (A) 09/06/2018 07:11 PM  
 Epithelial cells FEW 09/06/2018 07:11 PM  
 Bacteria 1+ (A) 09/06/2018 07:11 PM  
 WBC  09/06/2018 07:11 PM  
 RBC 5-10 09/06/2018 07:11 PM  
 
Medications Reviewed:  
 
Current Facility-Administered Medications Medication Dose Route Frequency  [START ON 9/12/2018] SITagliptin (JANUVIA) tablet tab 25 mg  25 mg Oral DAILY  QUEtiapine (SEROquel) tablet 25 mg  25 mg Oral BID  cephALEXin (KEFLEX) capsule 500 mg  500 mg Oral Q12H  
 amiodarone (CORDARONE) tablet 200 mg  200 mg Oral EVERY OTHER DAY  cholecalciferol (VITAMIN D3) tablet 2,000 Units  2,000 Units Oral DAILY  gabapentin (NEURONTIN) capsule 800 mg  800 mg Oral PCL  gabapentin (NEURONTIN) capsule 1,200 mg  1,200 mg Oral QHS  LORazepam (ATIVAN) tablet 0.5 mg  0.5 mg Oral QHS PRN  polyvinyl alcohol (LIQUIFILM TEARS) 1.4 % ophthalmic solution 1 Drop  1 Drop Both Eyes PRN  propranolol (INDERAL) tablet 20 mg  20 mg Oral QHS  sodium chloride (NS) flush 5-10 mL  5-10 mL IntraVENous Q8H  
 sodium chloride (NS) flush 5-10 mL  5-10 mL IntraVENous PRN  
 glucose chewable tablet 16 g  4 Tab Oral PRN  
 dextrose (D50W) injection syrg 12.5-25 g  25-50 mL IntraVENous PRN  
 glucagon (GLUCAGEN) injection 1 mg  1 mg IntraMUSCular PRN  
 insulin lispro (HUMALOG) injection   SubCUTAneous AC&HS  miconazole (SECURA) 2 % extra thick cream   Topical BID  QUEtiapine (SEROquel) tablet 12.5 mg  12.5 mg Oral Q8H PRN  
 ziprasidone (GEODON) 5 mg in sterile water (preservative free) 0.25 mL injection  5 mg IntraMUSCular Q12H PRN  
______________________________________________________________________ EXPECTED LENGTH OF STAY: 3d 0h 
ACTUAL LENGTH OF STAY:          5 Pedro Reyes NP

## 2018-09-11 NOTE — PROGRESS NOTES
Problem: Falls - Risk of 
Goal: *Absence of Falls Document Samy Smith Fall Risk and appropriate interventions in the flowsheet. Outcome: Progressing Towards Goal 
Fall Risk Interventions: 
Mobility Interventions: Bed/chair exit alarm, Communicate number of staff needed for ambulation/transfer, Patient to call before getting OOB Mentation Interventions: Bed/chair exit alarm, Adequate sleep, hydration, pain control, Door open when patient unattended, Family/sitter at bedside, More frequent rounding, Reorient patient, Room close to nurse's station, Toileting rounds Medication Interventions: Bed/chair exit alarm, Evaluate medications/consider consulting pharmacy, Patient to call before getting OOB, Teach patient to arise slowly Elimination Interventions: Bed/chair exit alarm, Call light in reach, Patient to call for help with toileting needs, Toileting schedule/hourly rounds History of Falls Interventions: Bed/chair exit alarm, Door open when patient unattended, Room close to nurse's station

## 2018-09-11 NOTE — PROGRESS NOTES
Problem: Pressure Injury - Risk of 
Goal: *Prevention of pressure injury Document Nathanael Scale and appropriate interventions in the flowsheet. Outcome: Progressing Towards Goal 
Pressure Injury Interventions: 
Sensory Interventions: Avoid rigorous massage over bony prominences, Assess changes in LOC, Check visual cues for pain, Float heels, Keep linens dry and wrinkle-free, Minimize linen layers Moisture Interventions: Apply protective barrier, creams and emollients, Check for incontinence Q2 hours and as needed, Maintain skin hydration (lotion/cream), Minimize layers Activity Interventions: Pressure redistribution bed/mattress(bed type) Mobility Interventions: HOB 30 degrees or less, Pressure redistribution bed/mattress (bed type) Nutrition Interventions: Document food/fluid/supplement intake, Offer support with meals,snacks and hydration Friction and Shear Interventions: HOB 30 degrees or less, Minimize layers

## 2018-09-11 NOTE — PROGRESS NOTES
CM continuing to follow. CM spoke with patient's grand-daughter  ([employer] [phone]) yesterday. Discussion took place regarding previous living arrangements at [facility] ([facility]). Ms. Vanda Rios states the patient has gone through 4 sets of caregivers. Per the grand daughter, the patient does not [place] in [place]. The granddaughter is the MPOA. CM asked that she bring in documentation to be added to the patient's record which reflects that she is MPOA. She states the patient has an  who is POA. The patient has had a previous stay at [facility]. This did not last long as the patient wanted to have control over her OTC medications. She was at [facility] approx 1-2 months. The grand daughter feels that if patient can return to this facility that it would be a good fit as it is a small community. The granddaughter has also been in communication with Lance Mcclendon ([phone]). CM met with patient to discuss plans. She desires to return to [facility]. CM called [facility] ([phone]). Message left to have  Manager Pradip Gonzales) return this CM's call. CM called [facility] and spoke with Elma Cai ([phone]). Facility had given the granddaughter a tour. CM awaiting assessment document (to be faxed to  [facility]) for physician completion. [facility] has a Memory Care Unit ($3,600.00/month with care cap of $2,000). Upon completion of form, facility will schedule a visit (here) by the Health and  to determine if patient is appropriate. Plan: 1. Follow-up with [facility]. Patient would require more caregivers to maintain independence and safety. 2. Follow-up with Lance Mcclend[facility]). Assessment document has not been received. Will request that form be faxed to [address] [place] [address]. 
3.  Call [facility]. Patient continues to require a sitter. She has been verbally abusive to staff at the previous facility and confrontational with staff here.  
INGRID Weller, CRM

## 2018-09-12 NOTE — PROGRESS NOTES
Hospitalist Progress Note Jovanna Looney NP Answering service: [phone] OR [phone] from in house phone Date of Service:  2018 NAME:  Jenny Nesbitt :  10/17/1932 MRN:  [id number] Admission Summary: Pt presented to the ED with a swollen, red and painful foot. She had been extremely anxious, agitated, combative and uncooperative upon arrival to the ED. According to reports, she had been sent to her PCP office for combative behavior. Pmhx: dementia, Parkinson disease, type 2 diabetes mellitus, chronic pain, peripheral neuropathy Interval history / Subjective:  
Pt up in chair, behavior has been better over last 48 hours. Januvia started this morning. Naseem to evaluate pt today. Pt will finish abx this evening Assessment & Plan:  
 
Urinary tract infection:  Completes abx on  
- suspicious for UTI on admission 
- ucx with > 100 K of mixed urogenital dante 
- yeast on UA, none on ucx 
- rcvd rocephin x 2 doses, IV access is lost, complete therapy on Keflex for this and cellulitis Cellulitis/nonhealing L foot wound: Completes on  
- Left foot 3-view x-ray showed nonspecific soft tissue swelling with no fx or OM noted - Podiatry consulted - he has seen (though pt not cooperative) and does not believe this to be a surgical issue, continue with wound care. - had 1 dose of IV vancomycin and now no IV 
- treat with kefllex - she is now taking po 
- pain: tylenol prn Leukocytosis: Resolved Hx Type 2 diabetes mellitus with hyperglycemia, uncontrolled:  
- HgbA1c 6.3 
- accuchecks ordered with SSI. Blood glucose 138-220 
- on carbohydrate controlled diet -  Januvia added for am 
 
Rash with superimposed cellulitis:  
- buttock/sacral/thigh redness 
- wound care has seen, agree with their recommendations Metabolic Encephalopathy with agressive behavior and agitation and a hx of dementia:  Improved on 9/12 
- could be exacerbated by UTI/cellulitis 
- was initially sent to ED for TDO eval 
- psychiatry has seen and ordered meds 
- has been prescribed seroquel (scheduled) and geodon (prn) Hx Parkinson disease:   
- nortriptyline, galantamine, propranalol at home Code status: Full DVT prophylaxis: SCDs Care Plan discussed with: nursing Disposition: to be determined - pt wants to go to a different  Assisted Living on discharge. Consult sent to CM with specific pt request.   
 
Hospital Problems  Date Reviewed: 9/6/2018 Codes Class Noted POA Leukocytosis ICD-10-CM: N75.659 ICD-9-CM: 288.60  9/6/2018 Unknown Agitation ICD-10-CM: R45.1 ICD-9-CM: 307.9  9/6/2018 Unknown * (Principal)UTI (urinary tract infection) ICD-10-CM: N39.0 ICD-9-CM: 599.0  9/6/2018 Unknown Cellulitis of left foot ICD-10-CM: L03.116 ICD-9-CM: 682.7  9/6/2018 Unknown Cellulitis of multiple sites of buttock ICD-10-CM: L03.317 ICD-9-CM: 682.5  9/6/2018 Unknown Dementia with behavioral disturbance ICD-10-CM: F03.91 
ICD-9-CM: 294.21  9/6/2018 Unknown Review of Systems:  
Denies sob/cp Denies abd pain n/v/d Vital Signs:  
 Last 24hrs VS reviewed since prior progress note. Most recent are: 
Visit Vitals  /47 (BP 1 Location: Left arm, BP Patient Position: Sitting)  Pulse 66  Temp 97.5 °F (36.4 °C)  Resp 16  
 Ht 5' 2\" (1.575 m)  Wt 50.7 kg (111 lb 12.4 oz)  SpO2 96%  Breastfeeding No  
 BMI 20.44 kg/m2 No intake or output data in the 24 hours ending 09/12/18 0913 Physical Examination:  
     
Constitutional:  Elderly female in no distress, very [place] ENT:  Oral mucous membranes moist   
Resp:  No accessory muscle use and on RA 
ABD: soft, nt, + bs Neurologic:  Moves all extremities. Oriented to person, place Extremities: Scattered bruises, skin tears. 1+ pitting edema in BLE (feet) Skin: See below for pictures. Has reddened sacrum, posterior thighs and buttocks - does appear rash like but could be due to moisture or yeast  
Scattered bruises and scabbed areas/skin tears to arms and legs. Large scabbed wound to left lateral foot. Left leg 9/12 Left leg 9/12 Left leg 9/12 Right leg 9/12 9/7:   
        
L Arm                                                                 L foot            Bilateral legs            Sacrum Data Review:  
Review and/or order of clinical lab test 
Review and/or order of tests in the radiology section of CPT Review and/or order of tests in the medicine section of CPT Labs:  
 
No results for input(s): WBC, HGB, HCT, PLT, HGBEXT, HCTEXT, PLTEXT, HGBEXT, HCTEXT, PLTEXT in the last 72 hours. No results for input(s): NA, K, CL, CO2, BUN, CREA, GLU, CA, MG, PHOS, URICA in the last 72 hours. No results for input(s): SGOT, GPT, ALT, AP, TBIL, TBILI, TP, ALB, GLOB, GGT, AML, LPSE in the last 72 hours. No lab exists for component: AMYP, HLPSE No results for input(s): INR, PTP, APTT in the last 72 hours. No lab exists for component: INREXT, INREXT No results for input(s): FE, TIBC, PSAT, FERR in the last 72 hours. Lab Results Component Value Date/Time Folate 14.9 03/04/2015 02:49 PM  
  
No results for input(s): PH, PCO2, PO2 in the last 72 hours. No results for input(s): CPK, CKNDX, TROIQ in the last 72 hours. No lab exists for component: CPKMB No results found for: CHOL, CHOLX, CHLST, CHOLV, HDL, LDL, LDLC, DLDLP, TGLX, TRIGL, TRIGP, CHHD, CHHDX Lab Results Component Value Date/Time Glucose (POC) 166 (H) 09/12/2018 05:55 AM  
 Glucose (POC) 231 (H) 09/11/2018 09:38 PM  
 Glucose (POC) 150 (H) 09/11/2018 04:14 PM  
 Glucose (POC) 200 (H) 09/11/2018 11:48 AM  
 Glucose (POC) 134 (H) 09/11/2018 06:50 AM  
 
Lab Results Component Value Date/Time  Color YELLOW/STRAW 09/06/2018 07:11 PM  
 Appearance CLOUDY (A) 09/06/2018 07:11 PM  
 Specific gravity 1.015 09/06/2018 07:11 PM  
 pH (UA) 5.5 09/06/2018 07:11 PM  
 Protein NEGATIVE  09/06/2018 07:11 PM  
 Glucose NEGATIVE  09/06/2018 07:11 PM  
 Ketone NEGATIVE  09/06/2018 07:11 PM  
 Bilirubin NEGATIVE  09/06/2018 07:11 PM  
 Urobilinogen 0.2 09/06/2018 07:11 PM  
 Nitrites POSITIVE (A) 09/06/2018 07:11 PM  
 Leukocyte Esterase LARGE (A) 09/06/2018 07:11 PM  
 Epithelial cells FEW 09/06/2018 07:11 PM  
 Bacteria 1+ (A) 09/06/2018 07:11 PM  
 WBC  09/06/2018 07:11 PM  
 RBC 5-10 09/06/2018 07:11 PM  
 
Medications Reviewed:  
 
Current Facility-Administered Medications Medication Dose Route Frequency  SITagliptin (JANUVIA) tablet tab 25 mg  25 mg Oral DAILY  QUEtiapine (SEROquel) tablet 25 mg  25 mg Oral BID  cephALEXin (KEFLEX) capsule 500 mg  500 mg Oral Q12H  
 amiodarone (CORDARONE) tablet 200 mg  200 mg Oral EVERY OTHER DAY  cholecalciferol (VITAMIN D3) tablet 2,000 Units  2,000 Units Oral DAILY  gabapentin (NEURONTIN) capsule 800 mg  800 mg Oral PCL  gabapentin (NEURONTIN) capsule 1,200 mg  1,200 mg Oral QHS  LORazepam (ATIVAN) tablet 0.5 mg  0.5 mg Oral QHS PRN  polyvinyl alcohol (LIQUIFILM TEARS) 1.4 % ophthalmic solution 1 Drop  1 Drop Both Eyes PRN  propranolol (INDERAL) tablet 20 mg  20 mg Oral QHS  sodium chloride (NS) flush 5-10 mL  5-10 mL IntraVENous Q8H  
 sodium chloride (NS) flush 5-10 mL  5-10 mL IntraVENous PRN  
 glucose chewable tablet 16 g  4 Tab Oral PRN  
 dextrose (D50W) injection syrg 12.5-25 g  25-50 mL IntraVENous PRN  
 glucagon (GLUCAGEN) injection 1 mg  1 mg IntraMUSCular PRN  
 insulin lispro (HUMALOG) injection   SubCUTAneous AC&HS  miconazole (SECURA) 2 % extra thick cream   Topical BID  QUEtiapine (SEROquel) tablet 12.5 mg  12.5 mg Oral Q8H PRN  
 ziprasidone (GEODON) 5 mg in sterile water (preservative free) 0.25 mL injection  5 mg IntraMUSCular Q12H PRN  
 ______________________________________________________________________ EXPECTED LENGTH OF STAY: 3d 0h 
ACTUAL LENGTH OF STAY:          6 Jovanna Six, NP

## 2018-09-12 NOTE — PHYSICIAN ADVISORY
Approved INPT Dr. Michelle Gonzalez 09/12/18 12:46 PM  
 
 
Bala Borja MD MPH FACP Physician [address] [place] [address]  [phone]

## 2018-09-12 NOTE — PROGRESS NOTES
PSYCHIATRIC PROGRESS NOTE Patient Name  Carol Brown Date of Birth 10/17/1932 [facility] [id number] Medical Record Number  [id number] Age  80 y.o. PCP Earlene Shen DO Admit date:  9/6/2018 Room Number  615/01  @ [facility]  
Date of Service  9/12/2018 PSYCHOTHERAPY SESSION NOTE: 
Length of psychotherapy session: 30 minutes Main condition/diagnosis/issues treated during session today: dementia and agitation Interpersonal relationship issues and psychodynamic conflicts explored. Supportive psychotherapy provided in regards to various ongoing psychosocial stressors (including some of the following): 
 pre-admission and current problems Housing issues Occupational issues Academic issues Legal issues Medical issues Interpersonal conflicts Stress of hospitalization Worked on issues of denial & effects of substance dependency/use Cognitive/Behavioral therapy provided Reality-Oriented psychotherapy provided Overall, patient is minimally not progressing. An extended energy and skill set was needed to engage pt in psychotherapy due to some of the following: resistiveness, complexity, negativity, confrontational nature, hostile behaviors, and/or severe abnormalities in thought processes/psychosis resulting in the loss of expressive/receptive language communication skills. E & M PROGRESS NOTE: 
  
 
 
HISTORY  
   
CC/HISTORY OF PRESENT ILLNESS/INTERVAL HISTORY:  (reviewed/updated 9/12/2018). per initial evaluation:  
 
 
Carol Brown presents/reports/evidences the following emotional symptoms today, 9/12/2018:  agitation and emotional lability, deficits in memory, delusional thinking. The above symptoms have been present for a few months. These symptoms are of significant severity. The symptoms are almost constant in nature. Additional symptomatology include increased irritability, poor concentration and tearfulness.  Pt's macular degeneration and poor hearing contribute to anxiety and confusion. Granddaughter states pt precipitantly worse following stroke June 2018. SIDE EFFECTS: (reviewed/updated 9/12/2018) None reported or admitted to. No noted toxicity with use of Depakote/Tegretol/lithium/Clozaril/TCAs ALLERGIES:(reviewed/updated 9/12/2018) Allergies Allergen Reactions  Prednisone Other (comments) \"I have the flu; I'm not going to take it\" MEDICATIONS PRIOR TO ADMISSION:(reviewed/updated 9/12/2018) Prescriptions Prior to Admission Medication Sig  
 gabapentin (NEURONTIN) 400 mg capsule Take 1,200 mg by mouth nightly.  LORazepam (ATIVAN) 0.5 mg tablet Take 0.5 mg by mouth nightly as needed.  gabapentin (NEURONTIN) 400 mg capsule Take 800 mg by mouth daily (after lunch).  propranolol (INDERAL) 20 mg tablet Take 20 mg by mouth nightly.  cholecalciferol, vitamin D3, (VITAMIN D3) 2,000 unit tab Take 2,000 Units by mouth daily.  Calcium-Cholecalciferol, D3, 600 mg(1,500mg) -400 unit cap Take 1 Cap by mouth daily.  peg 400-propylene glycol (SYSTANE, PROPYLENE GLYCOL,) 0.4-0.3 % drop Apply 1 Drop to eye as needed.  bisacodyl (DULCOLAX, BISACODYL,) 5 mg EC tablet Take 5 mg by mouth daily as needed for Constipation.  albuterol (ACCUNEB) 1.25 mg/3 mL nebu 3 mL by Nebulization route every six (6) hours as needed. Wheezing or shortness of breath  linaclotide (LINZESS) 290 mcg cap capsule Take 290 mcg by mouth daily as needed.  galantamine (RAZADYNE) 8 mg tablet Take 8 mg by mouth daily.  donepezil (ARICEPT) 5 mg tablet Take 5 mg by mouth nightly.  nortriptyline (PAMELOR) 10 mg capsule Take 20 mg by mouth nightly.  amiodarone (CORDARONE) 200 mg tablet Take 200 mg by mouth every other day.  VIT A/VIT C/VIT E/ZINC/COPPER (OCUVITE PRESERVISION PO) Take 1 Cap by mouth two (2) times a day.  omeprazole (PRILOSEC) 20 mg capsule Take 20 mg by mouth daily as needed. PAST MEDICAL HISTORY: Past medical history from the initial psychiatric evaluation has been reviewed (reviewed/updated 9/12/2018) with no additional updates (I asked patient and no additional past medical history provided). Past Medical History:  
Diagnosis Date  Chronic pain Peripheral neuropathy  Dementia  Diabetes ([facility]  Parkinson's disease ([facility] [address] Past Surgical History:  
Procedure Laterality Date [address] UNLISTED  2011 Umbilical Hernia repair  CARDIAC SURG PROCEDURE UNLIST  4/2014  
 valve replacement SOCIAL HISTORY: Social history from the initial psychiatric evaluation has been reviewed (reviewed/updated 9/12/2018) with no additional updates (I asked patient and no additional social history provided). Social History Social History  Marital status:  Spouse name: N/A  
 Number of children: N/A  
 Years of education: N/A Occupational History  Not on file. Social History Main Topics  Smoking status: Former Smoker Packs/day: 1.00 Years: 60.00 Quit date: 3/30/2013  Smokeless tobacco: Never Used  Alcohol use No  
 Drug use: No  
 Sexual activity: Not on file Other Topics Concern  Not on file Social History Narrative FAMILY HISTORY: Family history from the initial psychiatric evaluation has been reviewed (reviewed/updated 9/12/2018) with no additional updates (I asked patient and no additional family history provided). Family History Problem Relation Age of Onset  Diabetes Mother  Parkinsonism Father  Diabetes Brother REVIEW OF SYSTEMS: (reviewed/updated 9/12/2018) Appetite:fair Sleep fair, needs medication All other Review of Systems:  
   Neurological ROS: tremors [place] MENTAL STATUS EXAM (MSE): MSE FINDINGS ARE WITHIN NORMAL LIMITS (WNL) UNLESS OTHERWISE STATED BELOW.    
Orientation Alert and Oriented x 2  
 Vital Signs (BP,Pulse, Temp) See below (reviewed 9/12/2018); vital signs are WNL if not listed below. Gait and Station Stable/steady, no ataxia Abnormal Muscular Movements, Tone, and Behavior No EPS, no Tardive Dyskinesia, no abnormal muscular movements; wnl tone Relations unreliable General Appearance:  age appropriate, disheveled and older than stated age Language No aphasia or dysarthria Speech:  hyperverbal, non-pressured and mild increase in loudness likely related to her difficulty hearing Thought Processes illogical, wnl rate of thoughts, impaired abstract reasoning and computation Thought Associations circumstantial and tangential  
Thought Content delusions and paranoid thoughts Suicidal Ideations none Homicidal Ideations none Mood:  labile Affect:  anxious, increased in intensity, labile and sad Memory recent  impaired Memory remote:  impaired Concentration/Attention:  impaired Fund of Knowledge Not assessedaverage Insight:  limited Reliability poor Judgment:  limited VITALS:    
Patient Vitals for the past 24 hrs: 
 Temp Pulse Resp BP SpO2  
09/12/18 1454 98 °F (36.7 °C) 67 17 122/54 98 % 09/12/18 0836 97.5 °F (36.4 °C) 66 16 137/47 96 % 09/12/18 0405 98 °F (36.7 °C) 61 16 102/67 97 % 09/11/18 2058 97.6 °F (36.4 °C) 82 16 121/73 96 % DATA LABORATORY DATA:(reviewed/updated 9/12/2018) Recent Results (from the past 24 hour(s)) GLUCOSE, POC Collection Time: 09/11/18  9:38 PM  
Result Value Ref Range Glucose (POC) 231 (H) 65 - 100 mg/dL Performed by [employer], POC Collection Time: 09/12/18  5:55 AM  
Result Value Ref Range Glucose (POC) 166 (H) 65 - 100 mg/dL Performed by [employer], POC Collection Time: 09/12/18 11:14 AM  
Result Value Ref Range Glucose (POC) 181 (H) 65 - 100 mg/dL Performed by Gem Chaparro GLUCOSE, POC  Collection Time: 09/12/18  4:42 PM  
 Result Value Ref Range Glucose (POC) 165 (H) 65 - 100 mg/dL Performed by Eugena Lennox No results found for: VALF2, VALAC, VALP, VALPR, DS6, CRBAM, CRBAMP, CARB2, XCRBAM 
No results found for: LITHM  
RADIOLOGY REPORTS:(reviewed/updated 9/12/2018) Xr Foot Lt Min 3 V Result Date: 9/6/2018 EXAM:  XR FOOT LT MIN 3 V INDICATION:   Left foot soft tissue wounds. COMPARISON:  None. FINDINGS:  Three views of the left foot demonstrate no fracture or other acute osseous or articular abnormality. No cortical erosion. Soft tissue swelling is nonfocal. No soft tissue gas or radiodense foreign body. No evidence of septic arthritis. 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 IMPRESSION:  Nonspecific soft tissue swelling. No fracture or osteomyelitis. Xr Chest [facility] Result Date: 6/22/2018 Clinical indication: Shortness of breath. Portable AP upright view of the chest is obtained, comparison December 17. A mild interstitial pattern and blunting of the right costophrenic angle are stable findings. The heart size is normal. Prior sternotomy. IMPRESSION: No acute changes. MEDICATIONS ALL MEDICATIONS:  
Current Facility-Administered Medications Medication Dose Route Frequency  SITagliptin (JANUVIA) tablet tab 25 mg  25 mg Oral DAILY  QUEtiapine (SEROquel) tablet 25 mg  25 mg Oral BID  cephALEXin (KEFLEX) capsule 500 mg  500 mg Oral Q12H  
 amiodarone (CORDARONE) tablet 200 mg  200 mg Oral EVERY OTHER DAY  cholecalciferol (VITAMIN D3) tablet 2,000 Units  2,000 Units Oral DAILY  gabapentin (NEURONTIN) capsule 800 mg  800 mg Oral PCL  gabapentin (NEURONTIN) capsule 1,200 mg  1,200 mg Oral QHS  LORazepam (ATIVAN) tablet 0.5 mg  0.5 mg Oral QHS PRN  polyvinyl alcohol (LIQUIFILM TEARS) 1.4 % ophthalmic solution 1 Drop  1 Drop Both Eyes PRN  propranolol (INDERAL) tablet 20 mg  20 mg Oral QHS  sodium chloride (NS) flush 5-10 mL  5-10 mL IntraVENous Q8H  
 sodium chloride (NS) flush 5-10 mL  5-10 mL IntraVENous PRN  
 glucose chewable tablet 16 g  4 Tab Oral PRN  
 dextrose (D50W) injection syrg 12.5-25 g  25-50 mL IntraVENous PRN  
 glucagon (GLUCAGEN) injection 1 mg  1 mg IntraMUSCular PRN  
 insulin lispro (HUMALOG) injection   SubCUTAneous AC&HS  miconazole (SECURA) 2 % extra thick cream   Topical BID  QUEtiapine (SEROquel) tablet 12.5 mg  12.5 mg Oral Q8H PRN  
 ziprasidone (GEODON) 5 mg in sterile water (preservative free) 0.25 mL injection  5 mg IntraMUSCular Q12H PRN  
  
SCHEDULED MEDICATIONS:  
Current Facility-Administered Medications Medication Dose Route Frequency  SITagliptin (JANUVIA) tablet tab 25 mg  25 mg Oral DAILY  QUEtiapine (SEROquel) tablet 25 mg  25 mg Oral BID  cephALEXin (KEFLEX) capsule 500 mg  500 mg Oral Q12H  
 amiodarone (CORDARONE) tablet 200 mg  200 mg Oral EVERY OTHER DAY  cholecalciferol (VITAMIN D3) tablet 2,000 Units  2,000 Units Oral DAILY  gabapentin (NEURONTIN) capsule 800 mg  800 mg Oral PCL  gabapentin (NEURONTIN) capsule 1,200 mg  1,200 mg Oral QHS  propranolol (INDERAL) tablet 20 mg  20 mg Oral QHS  sodium chloride (NS) flush 5-10 mL  5-10 mL IntraVENous Q8H  
 insulin lispro (HUMALOG) injection   SubCUTAneous AC&HS  miconazole (SECURA) 2 % extra thick cream   Topical BID ASSESSMENT & PLAN The patient, Chino Stevens, is a 80 y.o.  female who presents at this time for treatment of the following diagnoses: (reviewed/updated 9/12/2018) Patient Active Hospital Problem List: 
 UTI (urinary tract infection) (9/6/2018) Assessment: as per imedical team 
  Plan: as above Leukocytosis (9/6/2018) Assessment: as above Plan: as above Agitation (9/6/2018) Assessment: Pt required increase in quetiapine since last seen and prn ziprasidone. Today still upset but not volatile or severely agitated Plan: continue current medication Cellulitis of left foot (9/6/2018) Assessment: as per medical team 
  Plan: as above Cellulitis of multiple sites of buttock (9/6/2018) Assessment: as above Plan: as above Dementia with behavioral disturbance (9/6/2018) Assessment: Pt still can be easily agitated, has desperate quality about lack of security,  She is not capable of making reasoned judgment for herself at this time Plan: Continue support, consistency. Will likely not be able to return to lesser level of care than memory care unit. Hopefully with medical problems resolved she will become less confused. She does state she feels safe at [facility] though wants to go home. Long term prognosis for recovery to being able to manage in assisted living facility is not good but may be strived for so long term in memory care unit may be resolved. Will check with PCP, Leonardo Unger MD for further hx. I will continue to follow up with patient as deemed appropriate. I will continue to monitor blood levels (Depakote, Tegretol, lithium, clozapine---a drug with a narrow therapeutic index= NTI) and associated labs for drug therapy implemented that require intense monitoring for toxicity as deemed appropriate base on current medication side effects and pharmacodynamically determined drug 1/2 lives. A coordinated, multidisplinary treatment team round was conducted. The following regarding medications was addressed during rounds with patient:  
the risks and benefits of the proposed medication. The patient was given the opportunity to ask questions. Informed consent given to the use of the above medications. Will continue to adjust psychiatric and non-psychiatric medications (see above \"medication\" section and orders section for details) as deemed appropriate & based upon diagnoses and response to treatment.   
 
I will continue to order blood tests/labs and diagnostic tests as deemed appropriate and review results as they become available (see orders for details and above listed lab/test results). I will order psychiatric records from previous [facility] hospitals to further elucidate the nature of patient's psychopathology and review once available. I will gather additional collateral information from friends, family and o/p treatment team to further elucidate the nature of patient's psychopathology and baselline level of psychiatric functioning. Complete current electronic health record for patient has been reviewed today including consultant notes, ancillary staff notes, nurses and psychiatric tech notes Signed By:  
Nilesh Lopez MD 
9/12/2018

## 2018-09-12 NOTE — PROGRESS NOTES
Problem: Pressure Injury - Risk of 
Goal: *Prevention of pressure injury Document Nathanael Scale and appropriate interventions in the flowsheet. Outcome: Not Progressing Towards Goal 
Pressure Injury Interventions: 
Sensory Interventions: Assess changes in LOC, Avoid rigorous massage over bony prominences, Check visual cues for pain, Float heels, Keep linens dry and wrinkle-free, Minimize linen layers Moisture Interventions: Apply protective barrier, creams and emollients, Minimize layers, Maintain skin hydration (lotion/cream) Activity Interventions: Pressure redistribution bed/mattress(bed type) Mobility Interventions: HOB 30 degrees or less, Pressure redistribution bed/mattress (bed type) Nutrition Interventions: Document food/fluid/supplement intake, Offer support with meals,snacks and hydration Friction and Shear Interventions: HOB 30 degrees or less, Minimize layers

## 2018-09-12 NOTE — PROGRESS NOTES
Problem: Falls - Risk of 
Goal: *Absence of Falls Document Russ Sanford Fall Risk and appropriate interventions in the flowsheet. Outcome: Progressing Towards Goal 
Fall Risk Interventions: 
Mobility Interventions: Bed/chair exit alarm, Communicate number of staff needed for ambulation/transfer, Utilize walker, cane, or other assistive device Mentation Interventions: Bed/chair exit alarm, Adequate sleep, hydration, pain control, Door open when patient unattended, Room close to nurse's station, More frequent rounding Medication Interventions: Bed/chair exit alarm, Teach patient to arise slowly, Evaluate medications/consider consulting pharmacy Elimination Interventions: Bed/chair exit alarm, Call light in reach, Patient to call for help with toileting needs, Toileting schedule/hourly rounds History of Falls Interventions: Bed/chair exit alarm, Door open when patient unattended, Room close to nurse's station

## 2018-09-12 NOTE — PROGRESS NOTES
Bedside shift change report given to [facility], [facility] (oncoming nurse) by Francesca Tran, RN (offgoing nurse). Report included the following information SBAR, Kardex, ED Summary, [facility] and Recent Results. 8:25 AM: Received call from CT stating that the order for patient's head CT has now  and needs to be discontinued. They stated if MD wants to attempt to do another and if the patient will tolerate it, a new order will need to be put in. I will communicate this to Poornima Oliveira, the day shift nurse, for her to bring up in morning rounds.

## 2018-09-13 NOTE — PROGRESS NOTES
Bedside shift change report given to [facility] (oncoming nurse) by Patricia Covington RN (offgoing nurse). Report included the following information SBAR, Kardex, Intake/Output, MAR and Recent Results.

## 2018-09-13 NOTE — PROGRESS NOTES
Bedside shift change report given to TEETEE MILLER RN (oncoming nurse) by Israel RN (offgoing nurse). Report included the following information SBAR, Kardex and Recent Results. [id number]  Patient refused vital signs,  shift assessments and morning medication. . Patient stated she \"don't like the care here\", will continue to monitor.

## 2018-09-13 NOTE — PROGRESS NOTES
Problem: Pressure Injury - Risk of 
Goal: *Prevention of pressure injury Document Nathanael Scale and appropriate interventions in the flowsheet. Outcome: Progressing Towards Goal 
Pressure Injury Interventions: 
Sensory Interventions: Avoid rigorous massage over bony prominences, Check visual cues for pain, Float heels, Keep linens dry and wrinkle-free, Minimize linen layers Moisture Interventions: Minimize layers, Maintain skin hydration (lotion/cream), Apply protective barrier, creams and emollients Activity Interventions: Pressure redistribution bed/mattress(bed type) Mobility Interventions: HOB 30 degrees or less, Pressure redistribution bed/mattress (bed type), Float heels Nutrition Interventions: Document food/fluid/supplement intake, Offer support with meals,snacks and hydration Friction and Shear Interventions: HOB 30 degrees or less, Minimize layers

## 2018-09-13 NOTE — PROGRESS NOTES
Problem: Falls - Risk of 
Goal: *Absence of Falls Document Sabine Hensley Fall Risk and appropriate interventions in the flowsheet. Outcome: Progressing Towards Goal 
Fall Risk Interventions: 
Mobility Interventions: Bed/chair exit alarm, Patient to call before getting OOB, Communicate number of staff needed for ambulation/transfer Mentation Interventions: Bed/chair exit alarm, Adequate sleep, hydration, pain control, Door open when patient unattended, More frequent rounding, Room close to nurse's station, Toileting rounds Medication Interventions: Bed/chair exit alarm, Evaluate medications/consider consulting pharmacy, Patient to call before getting OOB, Teach patient to arise slowly Elimination Interventions: Bed/chair exit alarm, Call light in reach, Patient to call for help with toileting needs, Toileting schedule/hourly rounds History of Falls Interventions: Bed/chair exit alarm, Door open when patient unattended, Room close to nurse's station

## 2018-09-13 NOTE — PROGRESS NOTES
CM contacted patient's grand daughter Radha Mello [phone] [facility]) to discuss patient's behavior last evening and today. Ashwini is aware that until patient's behaviors are managed ,she will be unable to go to assisted living ([facility] [place]). CM spoke with patient about acceptable behaviors and once she displays appropriate behaviors she will have mor options for SERINA's. Patient denies any of the events last evening and states she is being abuses. CM gave patient opportunity to patient to speak with Ashwini by phone. She asked patient several questions regarding her finances. CM will continue to follow. TITI observed Sitter at bedside during the day.   Erich Rivera, INGRID, CRM

## 2018-09-13 NOTE — PROGRESS NOTES
Hospitalist Progress Note Mayela Do NP Answering service: [phone] OR [phone] from in house phone Date of Service:  2018 NAME:  Sandrita Goel :  10/17/1932 MRN:  [id number] Admission Summary: Pt presented to the ED with a swollen, red and painful foot. She had been extremely anxious, agitated, combative and uncooperative upon arrival to the ED. According to reports, she had been sent to her PCP office for combative behavior. Pmhx: dementia, Parkinson disease, type 2 diabetes mellitus, chronic pain, peripheral neuropathy Interval history / Subjective:  
Pt up in chair this am, easily agitated, becomes verbally aggressive. Have discussed with Dr. Juan Diego Hudson this am to consider taking this pt to psych unit. She is medically cleared and has completed abx. Mamie Krabbe has declined pt. Called back by nursing around noon for hypotension. Re evaluated pt at the bedside, she is not symptomatic. She is yelling and extremely agitated, sat's 98%, lungs cta bilaterally, very difficult to reassess her bp as she is not amenable. Geodon prn. Dr. Juan Diego Hudson to come see and adjust medications. Assessment & Plan:  
 
Urinary tract infection:  Completed abx on  
- suspicious for UTI on admission 
- ucx with > 100 K of mixed urogenital dante 
- yeast on UA, none on ucx 
- rcvd rocephin x 2 doses, IV access is lost, complete therapy on Keflex for this and cellulitis Cellulitis/nonhealing L foot wound: Completedon  
- Left foot 3-view x-ray showed nonspecific soft tissue swelling with no fx or OM noted - Podiatry consulted - he has seen (though pt not cooperative) and does not believe this to be a surgical issue, continue with wound care. - had 1 dose of IV vancomycin and now no IV 
- treat with kefllex - she is now taking po 
- pain: tylenol prn Leukocytosis: Resolved Hx Type 2 diabetes mellitus with hyperglycemia, uncontrolled:  
- HgbA1c 6.3 
- accuchecks ordered with SSI. Blood glucose 138-220 
- on carbohydrate controlled diet - 9/11 Januvia added for am 
- 9/13 increase januvia to 50 mg Rash with superimposed cellulitis: Treated - buttock/sacral/thigh redness 
- wound care has seen, agree with their recommendations Metabolic Encephalopathy with agressive behavior and agitation and a hx of dementia:   
- could be exacerbated by UTI/cellulitis 
- was initially sent to ED for TDO eval 
- psychiatry has seen and ordered meds 
- has been prescribed seroquel (scheduled) and geodon (prn) - 9/13 discussed with Dr. Willie Crisostomo, will come to re evaluate, might consider taking her to psych unit Hx Parkinson disease:   
- nortriptyline, galantamine, propranalol at home Code status: Full DVT prophylaxis: SCDs Care Plan discussed with: nursing Disposition: to be determined - pt wants to go to a different  Assisted Living on discharge. Consult sent to CM with specific pt request.   
 
Hospital Problems  Date Reviewed: 9/6/2018 Codes Class Noted POA Leukocytosis ICD-10-CM: H32.291 ICD-9-CM: 288.60  9/6/2018 Unknown Agitation ICD-10-CM: R45.1 ICD-9-CM: 307.9  9/6/2018 Unknown * (Principal)UTI (urinary tract infection) ICD-10-CM: N39.0 ICD-9-CM: 599.0  9/6/2018 Unknown Cellulitis of left foot ICD-10-CM: L03.116 ICD-9-CM: 682.7  9/6/2018 Unknown Cellulitis of multiple sites of buttock ICD-10-CM: L03.317 ICD-9-CM: 682.5  9/6/2018 Unknown Dementia with behavioral disturbance ICD-10-CM: F03.91 
ICD-9-CM: 294.21  9/6/2018 Unknown Review of Systems:  
Denies sob/cp Denies abd pain n/v/d Vital Signs:  
 Last 24hrs VS reviewed since prior progress note. Most recent are: 
Visit Vitals  BP (!) 76/52 (BP 1 Location: Right arm, BP Patient Position: At rest)  Pulse 87  Temp 98.1 °F (36.7 °C)  Resp 20  Ht 5' 2\" (1.575 m)  Wt 50.7 kg (111 lb 12.4 oz)  SpO2 95%  Breastfeeding No  
 BMI 20.44 kg/m2 No intake or output data in the 24 hours ending 09/13/18 1231 Physical Examination:  
     
Constitutional:  Elderly female in no distress, very [place] ENT:  Oral mucous membranes moist   
Resp:  No accessory muscle use and on RA 
ABD: soft, nt, + bs Neurologic:  Moves all extremities. Oriented to person, place Extremities: Scattered bruises, skin tears. 1+ pitting edema in BLE (feet) Skin: See below for pictures. Has reddened sacrum, posterior thighs and buttocks - does appear rash like but could be due to moisture or yeast  
Scattered bruises and scabbed areas/skin tears to arms and legs. Large scabbed wound to left lateral foot. Left leg 9/12 Left leg 9/12 Left leg 9/12 Right leg 9/12 9/7:   
        
L Arm                                                                 L foot            Bilateral legs            Sacrum Data Review:  
Review and/or order of clinical lab test 
Review and/or order of tests in the radiology section of CPT Review and/or order of tests in the medicine section of CPT Labs:  
 
No results for input(s): WBC, HGB, HCT, PLT, HGBEXT, HCTEXT, PLTEXT, HGBEXT, HCTEXT, PLTEXT in the last 72 hours. No results for input(s): NA, K, CL, CO2, BUN, CREA, GLU, CA, MG, PHOS, URICA in the last 72 hours. No results for input(s): SGOT, GPT, ALT, AP, TBIL, TBILI, TP, ALB, GLOB, GGT, AML, LPSE in the last 72 hours. No lab exists for component: AMYP, HLPSE No results for input(s): INR, PTP, APTT in the last 72 hours. No lab exists for component: INREXT, INREXT No results for input(s): FE, TIBC, PSAT, FERR in the last 72 hours. Lab Results Component Value Date/Time Folate 14.9 03/04/2015 02:49 PM  
  
No results for input(s): PH, PCO2, PO2 in the last 72 hours. No results for input(s): CPK, CKNDX, TROIQ in the last 72 hours. No lab exists for component: CPKMB No results found for: CHOL, CHOLX, CHLST, CHOLV, HDL, LDL, LDLC, DLDLP, TGLX, TRIGL, TRIGP, CHHD, CHHDX Lab Results Component Value Date/Time Glucose (POC) 249 (H) 09/13/2018 11:10 AM  
 Glucose (POC) 234 (H) 09/13/2018 11:08 AM  
 Glucose (POC) 190 (H) 09/12/2018 09:37 PM  
 Glucose (POC) 165 (H) 09/12/2018 04:42 PM  
 Glucose (POC) 181 (H) 09/12/2018 11:14 AM  
 
Lab Results Component Value Date/Time Color YELLOW/STRAW 09/06/2018 07:11 PM  
 Appearance CLOUDY (A) 09/06/2018 07:11 PM  
 Specific gravity 1.015 09/06/2018 07:11 PM  
 pH (UA) 5.5 09/06/2018 07:11 PM  
 Protein NEGATIVE  09/06/2018 07:11 PM  
 Glucose NEGATIVE  09/06/2018 07:11 PM  
 Ketone NEGATIVE  09/06/2018 07:11 PM  
 Bilirubin NEGATIVE  09/06/2018 07:11 PM  
 Urobilinogen 0.2 09/06/2018 07:11 PM  
 Nitrites POSITIVE (A) 09/06/2018 07:11 PM  
 Leukocyte Esterase LARGE (A) 09/06/2018 07:11 PM  
 Epithelial cells FEW 09/06/2018 07:11 PM  
 Bacteria 1+ (A) 09/06/2018 07:11 PM  
 WBC  09/06/2018 07:11 PM  
 RBC 5-10 09/06/2018 07:11 PM  
 
Medications Reviewed:  
 
Current Facility-Administered Medications Medication Dose Route Frequency  SITagliptin (JANUVIA) tablet tab 25 mg  25 mg Oral DAILY  QUEtiapine (SEROquel) tablet 25 mg  25 mg Oral BID  
 amiodarone (CORDARONE) tablet 200 mg  200 mg Oral EVERY OTHER DAY  cholecalciferol (VITAMIN D3) tablet 2,000 Units  2,000 Units Oral DAILY  gabapentin (NEURONTIN) capsule 800 mg  800 mg Oral PCL  gabapentin (NEURONTIN) capsule 1,200 mg  1,200 mg Oral QHS  LORazepam (ATIVAN) tablet 0.5 mg  0.5 mg Oral QHS PRN  polyvinyl alcohol (LIQUIFILM TEARS) 1.4 % ophthalmic solution 1 Drop  1 Drop Both Eyes PRN  propranolol (INDERAL) tablet 20 mg  20 mg Oral QHS  sodium chloride (NS) flush 5-10 mL  5-10 mL IntraVENous Q8H  
 sodium chloride (NS) flush 5-10 mL  5-10 mL IntraVENous PRN  
 glucose chewable tablet 16 g  4 Tab Oral PRN  
  dextrose (D50W) injection syrg 12.5-25 g  25-50 mL IntraVENous PRN  
 glucagon (GLUCAGEN) injection 1 mg  1 mg IntraMUSCular PRN  
 insulin lispro (HUMALOG) injection   SubCUTAneous AC&HS  miconazole (SECURA) 2 % extra thick cream   Topical BID  QUEtiapine (SEROquel) tablet 12.5 mg  12.5 mg Oral Q8H PRN  
 ziprasidone (GEODON) 5 mg in sterile water (preservative free) 0.25 mL injection  5 mg IntraMUSCular Q12H PRN  
______________________________________________________________________ EXPECTED LENGTH OF STAY: 3d 0h 
ACTUAL LENGTH OF STAY:          7 Nancy Bethea NP
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
## 2018-09-13 NOTE — DIABETES MGMT
DTC Progress Note Recommendations/ Comments: Chart review for hyperglycemia, but noted patient refused most recent dose of Januvia 25 mg and all of correction insulin so far today. If appropriate, please consider - Increasing Januvia to 50 mg daily and encourage to take due to low risk for hypoglycemia Current hospital DM medication: lispro correction scale, high sensitivity, Januvia 25 mg Chart reviewed on Stephanie Parsons. Patient is a 80 y.o. female with known DM on no DM meds PTA. A1c:  
Lab Results Component Value Date/Time Hemoglobin A1c 6.3 09/08/2018 04:46 AM  
 Hemoglobin A1c 6.3 09/07/2018 11:30 AM  
 
 
Recent Glucose Results:  
Lab Results Component Value Date/Time GLUCPOC 249 (H) 09/13/2018 11:10 AM  
 GLUCPOC 234 (H) 09/13/2018 11:08 AM  
 GLUCPOC 190 (H) 09/12/2018 09:37 PM  
  
 
Lab Results Component Value Date/Time Creatinine 0.78 09/08/2018 04:46 AM  
 
Estimated Creatinine Clearance: 41.7 mL/min (based on Cr of 0.78). Active Orders Diet DIET DIABETIC CONSISTENT CARB Regular PO intake: No data found. Will continue to follow as needed. Thank you Glen Felipe, MS, RN, CDE

## 2018-09-14 NOTE — WOUND CARE
Wound Consult: Follow Up Visit. Chart reviewed. Consulted for lower legs and buttocks areas; seen last week and treatment initiated. Spoke with patients nurse to hand off on visit; patient up In chair; she was cooperative with all care. Assessment: 
Left dorsal foot - 4.4 x 3.2 x 0.1 cm, moist wound with yellow film that is removed with cleansing - wound was GRETA; patient tells me she removed dressing; yellow film easily removed with cleansing so that wound is 90% red, 10% yellow along edges; no odor or purulence, erythema that was around wound has resolved. Lower legs have improved bilaterally - most areas resurfaced, healed or lightly scabbed; the lateral left lower leg above ankle remains with a 1.5 x 1.5 x 0.1 cm area of dry yellow;red that we covered with a mepitel one however, all other areas left GRETA after moisturizing. Her sacral / buttocks area is resolving - pale pink intact skin with resolved rash, remains with linear tiny denuded areas centrally. No discoloration from pressure noted. Treatment: 
Aquacel AG and Mepilex Border to left dorsal foot ulcer. Moisturized lower legs - mepitel one to left lateral lower leg. Secura antifungal ointment to buttocks. Wound Recommendations: 
Begin Aquacel AG and Mepilex Border to left dorsal foot ulcer; cleanse with saline, change every three days. Moisturize lower legs daily, Mepitel one as needed. Skin Care Recommendations: 1. Minimize friction/shear: minimize layers of linen/pads under patient. 2. Off load pressure/reposition: continue to turn and reposition approximately every 2 hours; float heels; waffle cushion for sitting. 3. Manage Moisture - keep skin folds dry; patient using pad in underwear. 4. Continue to monitor nutrition, pain, and skin risk scale, and skin assessment. Plan: 
Hand off to Claudette Carias NP on assessment and treatment; will update orders. We will continue to reassess routinely and as needed.  
Agnelica Flores RN,CWCN 
 Wound Healing Office [phone] Pager [phone]

## 2018-09-14 NOTE — PROGRESS NOTES
Problem: Falls - Risk of 
Goal: *Absence of Falls Document Jeremy Conrad Fall Risk and appropriate interventions in the flowsheet. Outcome: Progressing Towards Goal 
Fall Risk Interventions: 
Mobility Interventions: Bed/chair exit alarm Mentation Interventions: Bed/chair exit alarm, Door open when patient unattended, Room close to nurse's station, Reorient patient, More frequent rounding Medication Interventions: Bed/chair exit alarm Elimination Interventions: Bed/chair exit alarm, Toileting schedule/hourly rounds History of Falls Interventions: Bed/chair exit alarm, Door open when patient unattended, Room close to nurse's station

## 2018-09-14 NOTE — PROGRESS NOTES
Hospitalist Progress Note Ashley Shoemaker NP Answering service: [phone] OR [phone] from in house phone Date of Service:  2018 NAME:  Mikayla Gonzalez :  10/17/1932 MRN:  [id number] Admission Summary: Pt presented to the ED with a swollen, red and painful foot. She had been extremely anxious, agitated, combative and uncooperative upon arrival to the ED. According to reports, she had been sent to her PCP office for combative behavior. Pmhx: dementia, Parkinson disease, type 2 diabetes mellitus, chronic pain, peripheral neuropathy Interval history / Subjective:  
Pt in chair, anxious this am, worried about leaving hospital without clothes Medically clear, however, behavior is an issue. Psychiatry is working on medication adjustment. Perhaps pt can go to psych unit, probably more appropriate at this time being that her medical issues have been addressed. Hypotension from yesterday has resolved. Will add back norvasc if needed. Monitor blood sugars carefully, Januvia increased Assessment & Plan: Hx Type 2 diabetes mellitus with hyperglycemia, uncontrolled:  
- HgbA1c 6.3 
- accuchecks ordered with SSI. Blood glucose 138-220 
- on carbohydrate controlled diet -  Januvia added for am 
-  increase januvia to 50 mg 
 
Metabolic Encephalopathy with agressive behavior and agitation and a hx of dementia:  0 Metabolic issues have been addressed, now primary issues are dementia with behavioral disturbance 
- could be exacerbated by UTI/cellulitis 
- was initially sent to ED for TDO eval 
- psychiatry has seen and ordered meds 
- has been prescribed seroquel (scheduled) and geodon (prn) -  discussed with Dr. Ann Lopez, will come to re evaluate, might consider taking her to psych unit Urinary tract infection:  Completed abx on  
- suspicious for UTI on admission 
- ucx with > 100 K of mixed urogenital dante - yeast on UA, none on ucx 
- rcvd rocephin x 2 doses, IV access is lost, complete therapy on Keflex for this and cellulitis Cellulitis/nonhealing L foot wound: Completedon 9/12 
- Left foot 3-view x-ray showed nonspecific soft tissue swelling with no fx or OM noted - Podiatry consulted - he has seen (though pt not cooperative) and does not believe this to be a surgical issue, continue with wound care. - had 1 dose of IV vancomycin and now no IV 
- treat with kefllex - she is now taking po 
- pain: tylenol prn Leukocytosis: Resolved Rash with superimposed cellulitis: Treated - buttock/sacral/thigh redness 
- wound care has seen, agree with their recommendations Hx Parkinson disease:   
- nortriptyline, galantamine, propranalol at home Code status: Full DVT prophylaxis: SCDs Care Plan discussed with: nursing Disposition: to be determined - pt wants to go to a different  Assisted Living on discharge. Consult sent to CM with specific pt request.   
 
Hospital Problems  Date Reviewed: 9/6/2018 Codes Class Noted POA Leukocytosis ICD-10-CM: G42.057 ICD-9-CM: 288.60  9/6/2018 Unknown Agitation ICD-10-CM: R45.1 ICD-9-CM: 307.9  9/6/2018 Unknown * (Principal)UTI (urinary tract infection) ICD-10-CM: N39.0 ICD-9-CM: 599.0  9/6/2018 Unknown Cellulitis of left foot ICD-10-CM: L03.116 ICD-9-CM: 682.7  9/6/2018 Unknown Cellulitis of multiple sites of buttock ICD-10-CM: L03.317 ICD-9-CM: 682.5  9/6/2018 Unknown Dementia with behavioral disturbance ICD-10-CM: F03.91 
ICD-9-CM: 294.21  9/6/2018 Unknown Review of Systems:  
Denies sob/cp Denies abd pain n/v/d Vital Signs:  
 Last 24hrs VS reviewed since prior progress note. Most recent are: 
Visit Vitals  /69 (BP 1 Location: Right arm)  Pulse 69  Temp 98.4 °F (36.9 °C)  Resp 17  Ht 5' 2\" (1.575 m)  Wt 50.7 kg (111 lb 12.4 oz)  SpO2 95%  Breastfeeding No  
  BMI 20.44 kg/m2 No intake or output data in the 24 hours ending 09/14/18 0906 Physical Examination:  
     
Constitutional:  Elderly female in no distress, very [place] ENT:  Oral mucous membranes moist   
Resp:  No accessory muscle use and on RA 
ABD: soft, nt, + bs Neurologic:  Moves all extremities. Oriented to person, place Extremities: Scattered bruises, skin tears. 1+ pitting edema in BLE (feet) Skin: See below for pictures. Has reddened sacrum, posterior thighs and buttocks - does appear rash like but could be due to moisture or yeast  
Scattered bruises and scabbed areas/skin tears to arms and legs. Large scabbed wound to left lateral foot. Left leg 9/12 Left leg 9/12 Left leg 9/12 Right leg 9/12 9/7:   
        
L Arm                                                                 L foot            Bilateral legs            Sacrum Data Review:  
Review and/or order of clinical lab test 
Review and/or order of tests in the radiology section of CPT Review and/or order of tests in the medicine section of CPT Labs:  
 
No results for input(s): WBC, HGB, HCT, PLT, HGBEXT, HCTEXT, PLTEXT, HGBEXT, HCTEXT, PLTEXT in the last 72 hours. No results for input(s): NA, K, CL, CO2, BUN, CREA, GLU, CA, MG, PHOS, URICA in the last 72 hours. No results for input(s): SGOT, GPT, ALT, AP, TBIL, TBILI, TP, ALB, GLOB, GGT, AML, LPSE in the last 72 hours. No lab exists for component: AMYP, HLPSE No results for input(s): INR, PTP, APTT in the last 72 hours. No lab exists for component: INREXT, INREXT No results for input(s): FE, TIBC, PSAT, FERR in the last 72 hours. Lab Results Component Value Date/Time Folate 14.9 03/04/2015 02:49 PM  
  
No results for input(s): PH, PCO2, PO2 in the last 72 hours. No results for input(s): CPK, CKNDX, TROIQ in the last 72 hours. No lab exists for component: CPKMB No results found for: CHOL, CHOLX, CHLST, CHOLV, HDL, LDL, LDLC, DLDLP, TGLX, TRIGL, TRIGP, CHHD, CHHDX Lab Results Component Value Date/Time Glucose (POC) 169 (H) 09/14/2018 06:49 AM  
 Glucose (POC) 173 (H) 09/13/2018 09:21 PM  
 Glucose (POC) 172 (H) 09/13/2018 05:05 PM  
 Glucose (POC) 249 (H) 09/13/2018 11:10 AM  
 Glucose (POC) 234 (H) 09/13/2018 11:08 AM  
 
Lab Results Component Value Date/Time Color YELLOW/STRAW 09/06/2018 07:11 PM  
 Appearance CLOUDY (A) 09/06/2018 07:11 PM  
 Specific gravity 1.015 09/06/2018 07:11 PM  
 pH (UA) 5.5 09/06/2018 07:11 PM  
 Protein NEGATIVE  09/06/2018 07:11 PM  
 Glucose NEGATIVE  09/06/2018 07:11 PM  
 Ketone NEGATIVE  09/06/2018 07:11 PM  
 Bilirubin NEGATIVE  09/06/2018 07:11 PM  
 Urobilinogen 0.2 09/06/2018 07:11 PM  
 Nitrites POSITIVE (A) 09/06/2018 07:11 PM  
 Leukocyte Esterase LARGE (A) 09/06/2018 07:11 PM  
 Epithelial cells FEW 09/06/2018 07:11 PM  
 Bacteria 1+ (A) 09/06/2018 07:11 PM  
 WBC  09/06/2018 07:11 PM  
 RBC 5-10 09/06/2018 07:11 PM  
 
Medications Reviewed:  
 
Current Facility-Administered Medications Medication Dose Route Frequency  SITagliptin (JANUVIA) tablet tab 50 mg  50 mg Oral DAILY  haloperidol (HALDOL) tablet 1 mg  1 mg Oral TID  haloperidol lactate (HALDOL) injection 1 mg  1 mg IntraMUSCular Q8H PRN  
 haloperidol lactate (HALDOL) injection 2 mg  2 mg IntraMUSCular Q8H PRN  
 amiodarone (CORDARONE) tablet 200 mg  200 mg Oral EVERY OTHER DAY  cholecalciferol (VITAMIN D3) tablet 2,000 Units  2,000 Units Oral DAILY  gabapentin (NEURONTIN) capsule 800 mg  800 mg Oral PCL  gabapentin (NEURONTIN) capsule 1,200 mg  1,200 mg Oral QHS  LORazepam (ATIVAN) tablet 0.5 mg  0.5 mg Oral QHS PRN  polyvinyl alcohol (LIQUIFILM TEARS) 1.4 % ophthalmic solution 1 Drop  1 Drop Both Eyes PRN  propranolol (INDERAL) tablet 20 mg  20 mg Oral QHS  sodium chloride (NS) flush 5-10 mL  5-10 mL IntraVENous Q8H  
  sodium chloride (NS) flush 5-10 mL  5-10 mL IntraVENous PRN  
 glucose chewable tablet 16 g  4 Tab Oral PRN  
 dextrose (D50W) injection syrg 12.5-25 g  25-50 mL IntraVENous PRN  
 glucagon (GLUCAGEN) injection 1 mg  1 mg IntraMUSCular PRN  
 insulin lispro (HUMALOG) injection   SubCUTAneous AC&HS  miconazole (SECURA) 2 % extra thick cream   Topical BID  ziprasidone (GEODON) 5 mg in sterile water (preservative free) 0.25 mL injection  5 mg IntraMUSCular Q12H PRN  
______________________________________________________________________ EXPECTED LENGTH OF STAY: 3d 0h 
ACTUAL LENGTH OF STAY:          8 Marylene Farrier, NP

## 2018-09-14 NOTE — PROGRESS NOTES
NUTRITION COMPLETE ASSESSMENT 
 
RECOMMENDATIONS:  
1. Continue diet as ordered with ONS 2. Weekly weight Interventions/Plan:  
Food/Nutrient Delivery: Ensure Enlive BID, no other questions, requests or concerns Assessment:  
Reason for Assessment:  
[x]Reassessment Diet: Consistent carb 1800 kcal; Ensure Enlive BID Nutritionally Significant Medications: [x] Reviewed & Includes: Vitamin D3 2000IU; humalog correction scale (high sensitivity);  [place] daily Meal Intake: No data found. Pre-Hospitalization: 
Usual Appetite: Poor Diet at Home: Regular Vitamins/Supplements: No 
 
Current Hospitalization:  
Fluid Restriction:  N/A Appetite: Poor PO Ability: Independent Subjective: 
Pt sitting up in the chair, pleasant, but [place]. Admits she still enjoys Ensure shakes, so will continue. Objective: 
Chart reviewed, discussed with RN. Pt's intake unknown (no po documentation and pt is not a good historian). She is able to feed herself and consumes Ensure Enlive supplements. These are providing 700 kcal, 40 g protein per day-- meeting 59% and 82% of estimated kcal and protein needs, respectively. No new weight to assess. Estimated Nutrition Needs:  
Kcals/day: 1177 Kcals/day (1733-5255 kcal/day (MSJ x 1.3-1.4)) Protein: 49 g (1.3 g/kg) Fluid: 1200 ml (1 mL/kcal) Based On: [place] 1898 Weight Used: Actual wt (50.7 kg) Pt expected to meet estimated nutrient needs:  []   Yes     [x]  No [] Unable to predict at this time Nutrition Diagnosis:  
1. Inadequate protein-energy intake related to mental status, decreased appetite as evidenced by 18# weight loss x 2 months and pt admits minimal intake PTA Goals:   
 Pt to consume at least 50% of all meals and 1 supplement per day over the next 5-7 days Monitoring & Evaluation: - Total energy intake, Liquid meal replacement - Weight/weight change Previous Nutrition Goals Met:   Progressing Previous Recommendations:    No 
 
Education & Discharge Needs: 
 [x] None Identified 
 [] Identified and addressed [x] Participated in care plan, discharge planning, and/or interdisciplinary rounds Cultural, Spiritism and ethnic food preferences identified: None Skin Integrity: []Intact  [x]Other: pressure injury Edema: [x]None []Other Last BM:9/14/18 Food Allergies: [x]None []Other Diet Restrictions: Cultural/Christianity Preference(s): None Anthropometrics:   
Weight Loss Metrics 9/7/2018 6/22/2018 12/17/2017 4/14/2017 4/8/2017 3/28/2017 3/2/2017 Today's Wt 111 lb 12.4 oz 130 lb 130 lb 126 lb 130 lb 130 lb 140 lb BMI 20.44 kg/m2 21.63 kg/m2 21.63 kg/m2 23.05 kg/m2 23.78 kg/m2 23.78 kg/m2 25.61 kg/m2 Weight Source: Bed Height: 5' 2\" (157.5 cm), Body mass index is 20.44 kg/(m^2). IBW : 49.9 kg (110 lb), Usual Body Weight: 58.1 kg (128 lb),   
 
Labs:   
Lab Results Component Value Date/Time Sodium 140 09/08/2018 04:46 AM  
 Potassium 5.1 09/08/2018 04:46 AM  
 Chloride 106 09/08/2018 04:46 AM  
 CO2 23 09/08/2018 04:46 AM  
 Glucose 132 (H) 09/08/2018 04:46 AM  
 BUN 12 09/08/2018 04:46 AM  
 Creatinine 0.78 09/08/2018 04:46 AM  
 Calcium 9.2 09/08/2018 04:46 AM  
 Magnesium 1.9 12/17/2017 07:55 PM  
 Albumin 3.6 06/22/2018 09:32 PM  
 
Lab Results Component Value Date/Time Hemoglobin A1c 6.3 09/08/2018 04:46 AM  
 
Lei Query, [address]

## 2018-09-14 NOTE — PROGRESS NOTES
Pt continues to have volatile and violent aggressive behavior. [facility] was still irritable and easily annoyed but was able to be redirected and not be explosive. Pt with multifactor delirium including dementia, s/p stroke, infectious process, ever-changing environments as her behavior has led to a series of placements. On mental status she continues to be confused, paranoid, and angry. Not as interactive and cooperative as yesterday but just as confused and cognitively impaired. Blood pressure on low side with diastolics in the upper 70'G to low 73'X and systolic low as well. Will make choice of medication difficult. Haldol least problem with blood pressure besides benzodiazepines. Latter not usually helpful unless strong sedation neessary as increased confusion and agitation can be seen in the elderly confused folks. Will use haldol as the medication, hopefully an IV access can be done as giving her po medication may be a challenge.

## 2018-09-14 NOTE — PROGRESS NOTES
2340:  Bedside received from [address]. Patient sleeping in chair. 8418:  Patient removed dressings from legs. Will redress with assistance.

## 2018-09-14 NOTE — PROGRESS NOTES
Bedside and Verbal shift change report given to Osito Jaeger (oncoming nurse) by Florence Real (offgoing nurse). Report included the following information SBAR, MAR and Recent Results.

## 2018-09-14 NOTE — PROGRESS NOTES
Bedside shift change report given to Benjamin Waldrop (oncoming nurse) by Marian Barrientos (offgoing nurse).  Report included the following information SBAR

## 2018-09-14 NOTE — ADVANCED PRACTICE NURSE
Sleep disorders: Patient called out on call bell. Nurse entered room. Not certain how patient is sleeping at night. Problems with eating or feeding: Upon visit patient had just finished lunch. 80% of meal eaten. Patient awaiting for more chicken to arrive. Extra food arrived. Patient complains, \"It takes you forever to get extra food. I don't even want it now. \" Incontinence: Incontinent brief on. Patient up to [facility] with assistance. Confusion:  Patient knows her name and knows she is at [facility].  Speaking loudly and appears agitated saying, \"No one wants me. I am going to be put out on the street. \"  Patient rocking from side to side in her chair. Crying and tearful. I speak loud because I can't hear. Luispapamayra Sage just think I am an nobody because I can't hear. Hearing aides in ears, but she states they are not working. Nurse went to pharmacy and obtained new batteries for hearing aides. Patient states they are working. Difficult to assess her hearing with her agitated, frustrated state. Listened to patient for an hour. Anxious, frightened, lonely and angry are the emotions noted. Reassured patient. Patient has glasses but states she cannot see well. Evidence of Falls: Festus score 5. Up in chair currently. Skin breakdown: Nathanael score 17. Wound care nurse following. Plan: Reassure. Maintain calm safe environment. Encourage her to be calm. Functioning hear aides may assist with her ability to hear and communicate. When speaking with her, get at eye level and take time to listen. When communicating with her one to one may be best.  When several people are talking to her or the TV is on while communicating, it can cause overstimulation and agitation. Annika Lobo RN, MSN, CNS-BC, Gerontology [phone]

## 2018-09-15 NOTE — PROGRESS NOTES
Patient has become violent towards staff. Patient is hitting and attempting to dig her nails into the nurses. Patient has been incontinent of urine and refusing to have staff clean her up. Attempted to have patient stand and remove soiled clothes and shoes. Security called for assistance. Patient did finally agree to walk to bed but refused to get in to be cleaned. Security picked her up to put into the bed. Patient sustained a small skin tear to her left wrist. Area cleaned with water and mepilex applied for protection. Patient is cleaned, and diapered. Soiled clothes and shoes washed and hung to dry. :Patient is in bed with bed alarm on, side rails up x 3. Will continue to monitor this patient through end of shift.

## 2018-09-15 NOTE — PROGRESS NOTES
Bedside shift change report given to Maria Guadalupe Voss, RN (oncoming nurse) by Fariba Foley RN (offgoing nurse). Report included the following information SBAR, Kardex, Intake/Output, MAR, Recent Results and Med Rec Status. 2123: Results for Og Killian (MRN [id number]) as of 9/14/2018 23:02 Ref. Range 9/14/2018 21:23 GLUCOSE,FAST - POC Latest Ref Range: 65 - 100 mg/dL 400 (H) Blood glucose reading verified , rechecked again 395. Dr Kesha Joyce called for orders. 2325: Order received for 4 units of humalog x 1. Repeat glucose in one hour. 0023: Repeat glucose 279. Dr Kesha Joyce paged. 0100: Repeat glucose in one hour per Dr. Bill Rios: Repeat glucose 132.

## 2018-09-15 NOTE — PROGRESS NOTES
Problem: Falls - Risk of 
Goal: *Absence of Falls Document Kavitha Jeffersones Fall Risk and appropriate interventions in the flowsheet. Outcome: Progressing Towards Goal 
Fall Risk Interventions: 
Mobility Interventions: Bed/chair exit alarm Mentation Interventions: Bed/chair exit alarm Medication Interventions: Bed/chair exit alarm Elimination Interventions: Bed/chair exit alarm History of Falls Interventions: Bed/chair exit alarm

## 2018-09-15 NOTE — PROGRESS NOTES
Hospitalist Progress Note Renetta Mckeon NP Answering service: [phone] OR [phone] from in house phone Date of Service:  9/15/2018 NAME:  Olu Olivarez :  10/17/1932 MRN:  [id number] Admission Summary: Pt presented to the ED with a swollen, red and painful foot. She had been extremely anxious, agitated, combative and uncooperative upon arrival to the ED. According to reports, she had been sent to her PCP office for combative behavior. Pmhx: dementia, Parkinson disease, type 2 diabetes mellitus, chronic pain, peripheral neuropathy Interval history / Subjective:  
Noted pt was agitated and combative overnight again. Behavior is the biggest issue now. Psych is managing. Pt has completed abx. BS elevated, Januvia increased again. Pt sleeping in chair Assessment & Plan: Hx Type 2 diabetes mellitus with hyperglycemia, uncontrolled:  
- HgbA1c 6.3 
- accuchecks ordered with SSI. Blood glucose 138-220 
- on carbohydrate controlled diet -  Januvia added for am 
-  increase januvia to 50 mg 
- 9/15 Januvia increased to 423 mg 
 
Metabolic Encephalopathy with agressive behavior and agitation and a hx of dementia:  3/80 Metabolic issues have been addressed, now primary issues are dementia with behavioral disturbance 
- could be exacerbated by UTI/cellulitis 
- was initially sent to ED for TDO eval 
- psychiatry has seen and ordered meds 
- has been prescribed seroquel (scheduled) and geodon (prn) -  discussed with Dr. Yeimi Cervantes, will come to re evaluate, might consider taking her to psych unit Urinary tract infection:  Completed abx on  
- suspicious for UTI on admission 
- ucx with > 100 K of mixed urogenital dante 
- yeast on UA, none on ucx 
- rcvd rocephin x 2 doses, IV access is lost, complete therapy on Keflex for this and cellulitis Cellulitis/nonhealing L foot wound: Completedon  - Left foot 3-view x-ray showed nonspecific soft tissue swelling with no fx or OM noted - Podiatry consulted - he has seen (though pt not cooperative) and does not believe this to be a surgical issue, continue with wound care. - had 1 dose of IV vancomycin and now no IV 
- treat with kefllex - she is now taking po 
- pain: tylenol prn Leukocytosis: Resolved Rash with superimposed cellulitis: Treated - buttock/sacral/thigh redness 
- wound care has seen, agree with their recommendations Hx Parkinson disease:   
- nortriptyline, galantamine, propranalol at home Code status: Full DVT prophylaxis: SCDs Care Plan discussed with: nursing Disposition: to be determined - pt wants to go to a different  Assisted Living on discharge. Consult sent to CM with specific pt request.  
[facility] has declined Hospital Problems  Date Reviewed: 9/6/2018 Codes Class Noted POA Leukocytosis ICD-10-CM: K42.269 ICD-9-CM: 288.60  9/6/2018 Unknown Agitation ICD-10-CM: R45.1 ICD-9-CM: 307.9  9/6/2018 Unknown * (Principal)UTI (urinary tract infection) ICD-10-CM: N39.0 ICD-9-CM: 599.0  9/6/2018 Unknown Cellulitis of left foot ICD-10-CM: L03.116 ICD-9-CM: 682.7  9/6/2018 Unknown Cellulitis of multiple sites of buttock ICD-10-CM: L03.317 ICD-9-CM: 682.5  9/6/2018 Unknown Dementia with behavioral disturbance ICD-10-CM: F03.91 
ICD-9-CM: 294.21  9/6/2018 Unknown Review of Systems:  
Denies sob/cp Denies abd pain n/v/d Vital Signs:  
 Last 24hrs VS reviewed since prior progress note. Most recent are: 
Visit Vitals  /75 (BP 1 Location: Right arm, BP Patient Position: At rest)  Pulse 64  Temp 98.4 °F (36.9 °C)  Resp 19  
 Ht 5' 2\" (1.575 m)  Wt 50.7 kg (111 lb 12.4 oz)  SpO2 98%  Breastfeeding No  
 BMI 20.44 kg/m2 Intake/Output Summary (Last 24 hours) at 09/15/18 1111 Last data filed at 09/14/18 1330 Gross per 24 hour Intake              240 ml Output                0 ml Net              240 ml Physical Examination:  
     
Constitutional:  Elderly female in no distress, very [place], s;eeping ENT:  Oral mucous membranes moist   
Resp:  No accessory muscle use and on RA 
ABD: soft, nt, + bs Neurologic:  Sleeping Extremities: Scattered bruises, skin tears. 1+ pitting edema in BLE (feet) Skin: See below for pictures. Has reddened sacrum, posterior thighs and buttocks - does appear rash like but could be due to moisture or yeast  
Scattered bruises and scabbed areas/skin tears to arms and legs. Large scabbed wound to left lateral foot. Left leg 9/12 Left leg 9/12 Left leg 9/12 Right leg 9/12 9/7:   
        
L Arm                                                                 L foot            Bilateral legs            Sacrum Data Review:  
Review and/or order of clinical lab test 
Review and/or order of tests in the radiology section of CPT Review and/or order of tests in the medicine section of CPT Labs:  
 
No results for input(s): WBC, HGB, HCT, PLT, HGBEXT, HCTEXT, PLTEXT, HGBEXT, HCTEXT, PLTEXT in the last 72 hours. No results for input(s): NA, K, CL, CO2, BUN, CREA, GLU, CA, MG, PHOS, URICA in the last 72 hours. No results for input(s): SGOT, GPT, ALT, AP, TBIL, TBILI, TP, ALB, GLOB, GGT, AML, LPSE in the last 72 hours. No lab exists for component: AMYP, HLPSE No results for input(s): INR, PTP, APTT in the last 72 hours. No lab exists for component: INREXT, INREXT No results for input(s): FE, TIBC, PSAT, FERR in the last 72 hours. Lab Results Component Value Date/Time Folate 14.9 03/04/2015 02:49 PM  
  
No results for input(s): PH, PCO2, PO2 in the last 72 hours. No results for input(s): CPK, CKNDX, TROIQ in the last 72 hours. No lab exists for component: CPKMB No results found for: CHOL, CHOLX, CHLST, CHOLV, HDL, LDL, LDLC, DLDLP, Walter Ramirez, [address], [address] [facility] Lab Results Component Value Date/Time Glucose (POC) 199 (H) 09/15/2018 06:14 AM  
 Glucose (POC) 132 (H) 09/15/2018 02:34 AM  
 Glucose (POC) 279 (H) 09/15/2018 12:28 AM  
 Glucose (POC) 395 (H) 09/14/2018 11:07 PM  
 Glucose (POC) 360 (H) 09/14/2018 11:05 PM  
 
Lab Results Component Value Date/Time Color YELLOW/STRAW 09/06/2018 07:11 PM  
 Appearance CLOUDY (A) 09/06/2018 07:11 PM  
 Specific gravity 1.015 09/06/2018 07:11 PM  
 pH (UA) 5.5 09/06/2018 07:11 PM  
 Protein NEGATIVE  09/06/2018 07:11 PM  
 Glucose NEGATIVE  09/06/2018 07:11 PM  
 Ketone NEGATIVE  09/06/2018 07:11 PM  
 Bilirubin NEGATIVE  09/06/2018 07:11 PM  
 Urobilinogen 0.2 09/06/2018 07:11 PM  
 Nitrites POSITIVE (A) 09/06/2018 07:11 PM  
 Leukocyte Esterase LARGE (A) 09/06/2018 07:11 PM  
 Epithelial cells FEW 09/06/2018 07:11 PM  
 Bacteria 1+ (A) 09/06/2018 07:11 PM  
 WBC  09/06/2018 07:11 PM  
 RBC 5-10 09/06/2018 07:11 PM  
 
Medications Reviewed:  
 
Current Facility-Administered Medications Medication Dose Route Frequency  SITagliptin (JANUVIA) tablet 100 mg  100 mg Oral DAILY  haloperidol (HALDOL) tablet 1 mg  1 mg Oral TID  haloperidol lactate (HALDOL) injection 1 mg  1 mg IntraMUSCular Q8H PRN  
 haloperidol lactate (HALDOL) injection 2 mg  2 mg IntraMUSCular Q8H PRN  
 amiodarone (CORDARONE) tablet 200 mg  200 mg Oral EVERY OTHER DAY  cholecalciferol (VITAMIN D3) tablet 2,000 Units  2,000 Units Oral DAILY  gabapentin (NEURONTIN) capsule 800 mg  800 mg Oral PCL  gabapentin (NEURONTIN) capsule 1,200 mg  1,200 mg Oral QHS  LORazepam (ATIVAN) tablet 0.5 mg  0.5 mg Oral QHS PRN  polyvinyl alcohol (LIQUIFILM TEARS) 1.4 % ophthalmic solution 1 Drop  1 Drop Both Eyes PRN  propranolol (INDERAL) tablet 20 mg  20 mg Oral QHS  sodium chloride (NS) flush 5-10 mL  5-10 mL IntraVENous PRN  
 glucose chewable tablet 16 g  4 Tab Oral PRN  
  dextrose (D50W) injection syrg 12.5-25 g  25-50 mL IntraVENous PRN  
 glucagon (GLUCAGEN) injection 1 mg  1 mg IntraMUSCular PRN  
 insulin lispro (HUMALOG) injection   SubCUTAneous AC&HS  miconazole (SECURA) 2 % extra thick cream   Topical BID  ziprasidone (GEODON) 5 mg in sterile water (preservative free) 0.25 mL injection  5 mg IntraMUSCular Q12H PRN  
______________________________________________________________________ EXPECTED LENGTH OF STAY: 3d 0h 
ACTUAL LENGTH OF STAY:          9 Srinivas Muller NP

## 2018-09-15 NOTE — PROGRESS NOTES
Problem: Falls - Risk of 
Goal: *Absence of Falls Document Chun Reas Fall Risk and appropriate interventions in the flowsheet. Outcome: Progressing Towards Goal 
Fall Risk Interventions: 
Mobility Interventions: Bed/chair exit alarm Mentation Interventions: Bed/chair exit alarm Medication Interventions: Bed/chair exit alarm Elimination Interventions: Bed/chair exit alarm History of Falls Interventions: Bed/chair exit alarm Problem: Pressure Injury - Risk of 
Goal: *Prevention of pressure injury Document Nathanael Scale and appropriate interventions in the flowsheet. Outcome: Progressing Towards Goal 
Pressure Injury Interventions: 
Sensory Interventions: Assess changes in LOC, Pressure redistribution bed/mattress (bed type) Moisture Interventions: Absorbent underpads Activity Interventions: Increase time out of bed Mobility Interventions: Pressure redistribution bed/mattress (bed type) Nutrition Interventions: Document food/fluid/supplement intake Friction and Shear Interventions: Minimize layers, Lift sheet

## 2018-09-15 NOTE — PROGRESS NOTES
Problem: Pressure Injury - Risk of 
Goal: *Prevention of pressure injury Document Nathanael Scale and appropriate interventions in the flowsheet. Outcome: Progressing Towards Goal 
Pressure Injury Interventions: 
Sensory Interventions: Assess changes in LOC Moisture Interventions: Absorbent underpads Activity Interventions: Pressure redistribution bed/mattress(bed type) Mobility Interventions: Pressure redistribution bed/mattress (bed type) Nutrition Interventions: Document food/fluid/supplement intake Friction and Shear Interventions: HOB 30 degrees or less

## 2018-09-15 NOTE — PROGRESS NOTES
Bedside and Verbal shift change report given to Rayna Jha (oncoming nurse) by Madya (offgoing nurse). Report included the following information SBAR and Kardex.

## 2018-09-16 NOTE — PROGRESS NOTES
Not showing any diverse sx with medication, no prn medication over past day or so. Pleasant on approach, able to talk about vision and hearing problems, show some humor, relaxed. Dx: Dementia with vascular etiology and behavioral disturbance now pretty much resolved.

## 2018-09-16 NOTE — PROGRESS NOTES
Hospitalist Progress Note Kizzy Jordan NP Answering service: [phone] OR [phone] from in house phone Date of Service:  2018 NAME:  Jil Arriaga :  10/17/1932 MRN:  [id number] Admission Summary: Pt presented to the ED with a swollen, red and painful foot. She had been extremely anxious, agitated, combative and uncooperative upon arrival to the ED. According to reports, she had been sent to her PCP office for combative behavior. Pmhx: dementia, Parkinson disease, type 2 diabetes mellitus, chronic pain, peripheral neuropathy Interval history / Subjective:  
Pt sitting at edge of bed eating breakfast, w/o complaints. NAD, pt is calm and cooperative this morning. Second day of increased Januvia, changed supplements to Glucerna from Ensure. Would monitor another 24 hours before adding another agent Dispo: CM working on this. Behavior has been biggest obstacle in placement Assessment & Plan: Hx Type 2 diabetes mellitus with hyperglycemia, uncontrolled:  
- HgbA1c 6.3 
- accuchecks ordered with SSI. Blood glucose 138-220 
- on carbohydrate controlled diet -  Januvia added for am 
-  increase januvia to 50 mg 
- 9/15 Januvia increased to 100 mg 
-  continue to monitor, glucerna shakes instead of ensure Metabolic Encephalopathy with agressive behavior and agitation and a hx of dementia:   Metabolic issues have been addressed, now primary issues are dementia with behavioral disturbance 
- could be exacerbated by UTI/cellulitis 
- was initially sent to ED for TDO eval 
- psychiatry has seen and ordered meds 
- has been prescribed seroquel (scheduled) and geodon (prn) -  discussed with Dr. Claudell Dupre, will come to re evaluate, might consider taking her to psych unit Urinary tract infection:  Completed abx on  
- suspicious for UTI on admission 
- ucx with > 100 K of mixed urogenital dante - yeast on UA, none on ucx 
- rcvd rocephin x 2 doses, IV access is lost, complete therapy on Keflex for this and cellulitis Cellulitis/nonhealing L foot wound: Completedon 9/12 
- Left foot 3-view x-ray showed nonspecific soft tissue swelling with no fx or OM noted - Podiatry consulted - he has seen (though pt not cooperative) and does not believe this to be a surgical issue, continue with wound care. - had 1 dose of IV vancomycin and now no IV 
- treat with kefllex - she is now taking po 
- pain: tylenol prn Leukocytosis: Resolved Rash with superimposed cellulitis: Treated - buttock/sacral/thigh redness 
- wound care has seen, agree with their recommendations Hx Parkinson disease:   
- nortriptyline, galantamine, propranalol at home Code status: Full DVT prophylaxis: SCDs Care Plan discussed with: nursing Disposition: to be determined - pt wants to go to a different  Assisted Living on discharge. Consult sent to CM with specific pt request.  
Abi Bethea has declined Hospital Problems  Date Reviewed: 9/6/2018 Codes Class Noted POA Leukocytosis ICD-10-CM: A36.531 ICD-9-CM: 288.60  9/6/2018 Unknown Agitation ICD-10-CM: R45.1 ICD-9-CM: 307.9  9/6/2018 Unknown * (Principal)UTI (urinary tract infection) ICD-10-CM: N39.0 ICD-9-CM: 599.0  9/6/2018 Unknown Cellulitis of left foot ICD-10-CM: L03.116 ICD-9-CM: 682.7  9/6/2018 Unknown Cellulitis of multiple sites of buttock ICD-10-CM: L03.317 ICD-9-CM: 682.5  9/6/2018 Unknown Dementia with behavioral disturbance ICD-10-CM: F03.91 
ICD-9-CM: 294.21  9/6/2018 Unknown Review of Systems:  
Denies sob/cp Denies abd pain n/v/d Vital Signs:  
 Last 24hrs VS reviewed since prior progress note. Most recent are: 
Visit Vitals  BP (!) 86/60 (BP 1 Location: Right arm, BP Patient Position: At rest)  Pulse (!) 58  Temp 98.2 °F (36.8 °C)  Resp 18  Ht 5' 2\" (1.575 m)  Wt 50.7 kg (111 lb 12.4 oz)  SpO2 97%  Breastfeeding No  
 BMI 20.44 kg/m2 Intake/Output Summary (Last 24 hours) at 09/16/18 6096 Last data filed at 09/16/18 2416 Gross per 24 hour Intake              480 ml Output             1000 ml Net             -520 ml Physical Examination:  
     
Constitutional:  Elderly female in no distress, very [place], s;eeping ENT:  Oral mucous membranes moist   
Resp:  No accessory muscle use and on RA 
ABD: soft, nt, + bs Neurologic:  AA&Ox2, moves all extremities Extremities: Scattered bruises, skin tears. 1+ pitting edema in BLE (feet) Skin: See below for pictures. Has reddened sacrum, posterior thighs and buttocks - does appear rash like but could be due to moisture or yeast  
Scattered bruises and scabbed areas/skin tears to arms and legs. Large scabbed wound to left lateral foot. Left leg 9/12 Left leg 9/12 Left leg 9/12 Right leg 9/12 9/7:   
        
L Arm                                                                 L foot            Bilateral legs            Sacrum Data Review:  
Review and/or order of clinical lab test 
Review and/or order of tests in the radiology section of CPT Review and/or order of tests in the medicine section of CPT Labs:  
 
No results for input(s): WBC, HGB, HCT, PLT, HGBEXT, HCTEXT, PLTEXT, HGBEXT, HCTEXT, PLTEXT in the last 72 hours. No results for input(s): NA, K, CL, CO2, BUN, CREA, GLU, CA, MG, PHOS, URICA in the last 72 hours. No results for input(s): SGOT, GPT, ALT, AP, TBIL, TBILI, TP, ALB, GLOB, GGT, AML, LPSE in the last 72 hours. No lab exists for component: AMYP, HLPSE No results for input(s): INR, PTP, APTT in the last 72 hours. No lab exists for component: INREXT, INREXT No results for input(s): FE, TIBC, PSAT, FERR in the last 72 hours. Lab Results Component Value Date/Time  Folate 14.9 03/04/2015 02:49 PM  
  
 No results for input(s): PH, PCO2, PO2 in the last 72 hours. No results for input(s): CPK, CKNDX, TROIQ in the last 72 hours. No lab exists for component: CPKMB No results found for: CHOL, CHOLX, CHLST, CHOLV, HDL, LDL, LDLC, DLDLP, TGLX, TRIGL, TRIGP, CHHD, CHHDX Lab Results Component Value Date/Time Glucose (POC) 240 (H) 09/16/2018 06:30 AM  
 Glucose (POC) 267 (H) 09/15/2018 09:34 PM  
 Glucose (POC) 181 (H) 09/15/2018 04:11 PM  
 Glucose (POC) 190 (H) 09/15/2018 11:35 AM  
 Glucose (POC) 199 (H) 09/15/2018 06:14 AM  
 
Lab Results Component Value Date/Time Color YELLOW/STRAW 09/06/2018 07:11 PM  
 Appearance CLOUDY (A) 09/06/2018 07:11 PM  
 Specific gravity 1.015 09/06/2018 07:11 PM  
 pH (UA) 5.5 09/06/2018 07:11 PM  
 Protein NEGATIVE  09/06/2018 07:11 PM  
 Glucose NEGATIVE  09/06/2018 07:11 PM  
 Ketone NEGATIVE  09/06/2018 07:11 PM  
 Bilirubin NEGATIVE  09/06/2018 07:11 PM  
 Urobilinogen 0.2 09/06/2018 07:11 PM  
 Nitrites POSITIVE (A) 09/06/2018 07:11 PM  
 Leukocyte Esterase LARGE (A) 09/06/2018 07:11 PM  
 Epithelial cells FEW 09/06/2018 07:11 PM  
 Bacteria 1+ (A) 09/06/2018 07:11 PM  
 WBC  09/06/2018 07:11 PM  
 RBC 5-10 09/06/2018 07:11 PM  
 
Medications Reviewed:  
 
Current Facility-Administered Medications Medication Dose Route Frequency  SITagliptin (JANUVIA) tablet 100 mg  100 mg Oral DAILY  haloperidol (HALDOL) tablet 1 mg  1 mg Oral TID  haloperidol lactate (HALDOL) injection 1 mg  1 mg IntraMUSCular Q8H PRN  
 haloperidol lactate (HALDOL) injection 2 mg  2 mg IntraMUSCular Q8H PRN  
 amiodarone (CORDARONE) tablet 200 mg  200 mg Oral EVERY OTHER DAY  cholecalciferol (VITAMIN D3) tablet 2,000 Units  2,000 Units Oral DAILY  gabapentin (NEURONTIN) capsule 800 mg  800 mg Oral PCL  gabapentin (NEURONTIN) capsule 1,200 mg  1,200 mg Oral QHS  LORazepam (ATIVAN) tablet 0.5 mg  0.5 mg Oral QHS PRN  
  polyvinyl alcohol (LIQUIFILM TEARS) 1.4 % ophthalmic solution 1 Drop  1 Drop Both Eyes PRN  propranolol (INDERAL) tablet 20 mg  20 mg Oral QHS  sodium chloride (NS) flush 5-10 mL  5-10 mL IntraVENous PRN  
 glucose chewable tablet 16 g  4 Tab Oral PRN  
 dextrose (D50W) injection syrg 12.5-25 g  25-50 mL IntraVENous PRN  
 glucagon (GLUCAGEN) injection 1 mg  1 mg IntraMUSCular PRN  
 insulin lispro (HUMALOG) injection   SubCUTAneous AC&HS  miconazole (SECURA) 2 % extra thick cream   Topical BID  ziprasidone (GEODON) 5 mg in sterile water (preservative free) 0.25 mL injection  5 mg IntraMUSCular Q12H PRN  
______________________________________________________________________ EXPECTED LENGTH OF STAY: 3d 0h 
ACTUAL LENGTH OF STAY:          10 
           
Josefina Carias V, NP

## 2018-09-17 NOTE — PROGRESS NOTES
Hospitalist Progress Note Skyla Doshi NP Answering service: [phone] OR [phone] from in house phone [phone] Date of Service:  2018 NAME:  Cristina Gaona :  10/17/1932 MRN:  [id number] Admission Summary: Pt presented to the ED with a swollen, red and painful foot. She had been extremely anxious, agitated, combative and uncooperative upon arrival to the ED. According to reports, she had been sent to her PCP office for combative behavior. Pmhx: dementia, Parkinson disease, type 2 diabetes mellitus, chronic pain, peripheral neuropathy Interval history / Subjective:  
Pt lying in bed, no new complaints - she is really wanting to be discharged and has questions about where she will go. Assessment & Plan: Hx Type 2 diabetes mellitus with hyperglycemia, uncontrolled:  
- HgbA1c 6.3 
- accuchecks ordered with SSI. Blood glucose 106-232 
- on carbohydrate controlled diet - 9/15 Januvia increased to 100 mg 
-  continue to monitor, glucerna shakes instead of ensure Metabolic Encephalopathy with agressive behavior and agitation and a hx of dementia:   
-  Metabolic issues have been addressed, now primary issues are dementia with behavioral disturbance 
- could have been exacerbated by UTI/cellulitis 
- was initially sent to ED for TDO eval 
- psychiatry has seen and ordered meds - no seen plans to transfer to inpatient psych 
- has been prescribed seroquel (scheduled) and geodon (prn) 
- dispo problem due to behavioral issues. Psychiatry saw  and indicates improvement. Pt is calm and cooperative this am. On scheduled Haldol. Urinary tract infection:  Completed abx on  
- suspicious for UTI on admission 
- ucx with > 100 K of mixed urogenital dante 
- yeast on UA, none on ucx 
- rcvd rocephin x 2 doses, completed therapy on Keflex for this and cellulitis Cellulitis/nonhealing L foot wound: Completed on 9/12 
- Left foot 3-view x-ray showed nonspecific soft tissue swelling with no fx or OM noted - Podiatry consulted - he has seen (though pt not cooperative) and does not believe this to be a surgical issue, continue with wound care. - had 1 dose of IV vancomycin and lost IV 
- treated with kefllex - pain: tylenol prn Leukocytosis: Resolved Rash with superimposed cellulitis: Treated - buttock/sacral/thigh redness 
- wound care has seen, agree with their recommendations Hx Parkinson disease:   
- nortriptyline, galantamine, propranalol at home Code status: Full DVT prophylaxis: SCDs Care Plan discussed with: nursing Disposition: to be determined -  following Dort[facility] Butter has declined Hospital Problems  Date Reviewed: 9/6/2018 Codes Class Noted POA Leukocytosis ICD-10-CM: O71.620 ICD-9-CM: 288.60  9/6/2018 Unknown Agitation ICD-10-CM: R45.1 ICD-9-CM: 307.9  9/6/2018 Unknown * (Principal)UTI (urinary tract infection) ICD-10-CM: N39.0 ICD-9-CM: 599.0  9/6/2018 Unknown Cellulitis of left foot ICD-10-CM: L03.116 ICD-9-CM: 682.7  9/6/2018 Unknown Cellulitis of multiple sites of buttock ICD-10-CM: L03.317 ICD-9-CM: 682.5  9/6/2018 Unknown Dementia with behavioral disturbance ICD-10-CM: F03.91 
ICD-9-CM: 294.21  9/6/2018 Unknown Review of Systems:  
Denies HA. No chest pain or pressure. No SOB or GI complaints. Vital Signs:  
 Last 24hrs VS reviewed since prior progress note. Most recent are: 
Visit Vitals  /59 (BP 1 Location: Right arm, BP Patient Position: Sitting)  Pulse 72  Temp 98.7 °F (37.1 °C)  Resp 18  Ht 5' 2\" (1.575 m)  Wt 50.7 kg (111 lb 12.4 oz)  SpO2 95%  Breastfeeding No  
 BMI 20.44 kg/m2 No intake or output data in the 24 hours ending 09/17/18 1018 Physical Examination: Constitutional:  Elderly female in no distress, very [place] and has vision deficit. ENT:  Oral mucous membranes moist   
Resp:  No accessory muscle use and on RA  
GI: Active bowel sounds, non-tender. BM 9/15 Neurologic:  AA&Ox2, moves all extremities Extremities: Scattered bruises, skin tears. 1-2+ pitting edema in BLE (feet) Skin: Scattered bruises and scabbed areas/skin tears to arms and legs. Large scabbed wound to left lateral foot which looks improved since admit. Data Review:  
Review and/or order of clinical lab test 
Review and/or order of tests in the radiology section of CPT Review and/or order of tests in the medicine section of CPT Labs:  
 
No results for input(s): WBC, HGB, HCT, PLT, HGBEXT, HCTEXT, PLTEXT, HGBEXT, HCTEXT, PLTEXT in the last 72 hours. No results for input(s): NA, K, CL, CO2, BUN, CREA, GLU, CA, MG, PHOS, URICA in the last 72 hours. No results for input(s): SGOT, GPT, ALT, AP, TBIL, TBILI, TP, ALB, GLOB, GGT, AML, LPSE in the last 72 hours. No lab exists for component: AMYP, HLPSE No results for input(s): INR, PTP, APTT in the last 72 hours. No lab exists for component: INREXT, INREXT No results for input(s): FE, TIBC, PSAT, FERR in the last 72 hours. Lab Results Component Value Date/Time Folate 14.9 03/04/2015 02:49 PM  
  
No results for input(s): PH, PCO2, PO2 in the last 72 hours. No results for input(s): CPK, CKNDX, TROIQ in the last 72 hours. No lab exists for component: CPKMB No results found for: CHOL, CHOLX, CHLST, CHOLV, HDL, LDL, LDLC, DLDLP, TGLX, TRIGL, TRIGP, CHHD, CHHDX Lab Results Component Value Date/Time Glucose (POC) 147 (H) 09/17/2018 06:34 AM  
 Glucose (POC) 200 (H) 09/16/2018 09:25 PM  
 Glucose (POC) 106 (H) 09/16/2018 04:11 PM  
 Glucose (POC) 232 (H) 09/16/2018 11:36 AM  
 Glucose (POC) 240 (H) 09/16/2018 06:30 AM  
 
Lab Results Component Value Date/Time  Color YELLOW/STRAW 09/06/2018 07:11 PM  
 Appearance CLOUDY (A) 09/06/2018 07:11 PM  
 Specific gravity 1.015 09/06/2018 07:11 PM  
 pH (UA) 5.5 09/06/2018 07:11 PM  
 Protein NEGATIVE  09/06/2018 07:11 PM  
 Glucose NEGATIVE  09/06/2018 07:11 PM  
 Ketone NEGATIVE  09/06/2018 07:11 PM  
 Bilirubin NEGATIVE  09/06/2018 07:11 PM  
 Urobilinogen 0.2 09/06/2018 07:11 PM  
 Nitrites POSITIVE (A) 09/06/2018 07:11 PM  
 Leukocyte Esterase LARGE (A) 09/06/2018 07:11 PM  
 Epithelial cells FEW 09/06/2018 07:11 PM  
 Bacteria 1+ (A) 09/06/2018 07:11 PM  
 WBC  09/06/2018 07:11 PM  
 RBC 5-10 09/06/2018 07:11 PM  
 
Medications Reviewed:  
 
Current Facility-Administered Medications Medication Dose Route Frequency  SITagliptin (JANUVIA) tablet 100 mg  100 mg Oral DAILY  haloperidol (HALDOL) tablet 1 mg  1 mg Oral TID  haloperidol lactate (HALDOL) injection 1 mg  1 mg IntraMUSCular Q8H PRN  
 haloperidol lactate (HALDOL) injection 2 mg  2 mg IntraMUSCular Q8H PRN  
 amiodarone (CORDARONE) tablet 200 mg  200 mg Oral EVERY OTHER DAY  cholecalciferol (VITAMIN D3) tablet 2,000 Units  2,000 Units Oral DAILY  gabapentin (NEURONTIN) capsule 800 mg  800 mg Oral PCL  gabapentin (NEURONTIN) capsule 1,200 mg  1,200 mg Oral QHS  LORazepam (ATIVAN) tablet 0.5 mg  0.5 mg Oral QHS PRN  polyvinyl alcohol (LIQUIFILM TEARS) 1.4 % ophthalmic solution 1 Drop  1 Drop Both Eyes PRN  propranolol (INDERAL) tablet 20 mg  20 mg Oral QHS  sodium chloride (NS) flush 5-10 mL  5-10 mL IntraVENous PRN  
 glucose chewable tablet 16 g  4 Tab Oral PRN  
 dextrose (D50W) injection syrg 12.5-25 g  25-50 mL IntraVENous PRN  
 glucagon (GLUCAGEN) injection 1 mg  1 mg IntraMUSCular PRN  
 insulin lispro (HUMALOG) injection   SubCUTAneous AC&HS  miconazole (SECURA) 2 % extra thick cream   Topical BID  ziprasidone (GEODON) 5 mg in sterile water (preservative free) 0.25 mL injection  5 mg IntraMUSCular Q12H PRN  
 ______________________________________________________________________ EXPECTED LENGTH OF STAY: 3d 0h 
ACTUAL LENGTH OF STAY:          [address], [place]

## 2018-09-17 NOTE — PROGRESS NOTES
Bedside shift change report given to [address] (oncoming nurse) by Bella Fuentes (offgoing nurse). Report included the following information SBAR and Recent Results.

## 2018-09-18 NOTE — PROGRESS NOTES
Problem: Falls - Risk of 
Goal: *Absence of Falls Document Gigitita Paredes Fall Risk and appropriate interventions in the flowsheet. Outcome: Progressing Towards Goal 
Fall Risk Interventions: 
Mobility Interventions: Bed/chair exit alarm, Communicate number of staff needed for ambulation/transfer, Patient to call before getting OOB Mentation Interventions: Adequate sleep, hydration, pain control, Bed/chair exit alarm, More frequent rounding, Reorient patient, Room close to nurse's station, Toileting rounds Medication Interventions: Bed/chair exit alarm, Evaluate medications/consider consulting pharmacy, Patient to call before getting OOB, Teach patient to arise slowly Elimination Interventions: Bed/chair exit alarm, Call light in reach, Elevated toilet seat, Patient to call for help with toileting needs, Toileting schedule/hourly rounds History of Falls Interventions: Bed/chair exit alarm, Consult care management for discharge planning, Door open when patient unattended, Evaluate medications/consider consulting pharmacy, Room close to nurse's station Problem: Pressure Injury - Risk of 
Goal: *Prevention of pressure injury Document Nathanael Scale and appropriate interventions in the flowsheet. Outcome: Progressing Towards Goal 
Pressure Injury Interventions: 
Sensory Interventions: Assess need for specialty bed, Float heels, Keep linens dry and wrinkle-free Moisture Interventions: Absorbent underpads, Check for incontinence Q2 hours and as needed, Limit adult briefs, Offer toileting Q_hr Activity Interventions: Increase time out of bed, Pressure redistribution bed/mattress(bed type) Mobility Interventions: Float heels, HOB 30 degrees or less, Pressure redistribution bed/mattress (bed type) Nutrition Interventions: Document food/fluid/supplement intake, Discuss nutritional consult with provider, Offer support with meals,snacks and hydration Friction and Shear Interventions: Feet elevated on foot rest, HOB 30 degrees or less, Lift sheet, Transferring/repositioning devices

## 2018-09-18 NOTE — PROGRESS NOTES
Bedside and Verbal shift change report given to Bebeto Andino (oncoming nurse) by Jessica Russo (offgoing nurse). Report included the following information SBAR, Kardex, MAR and Recent Results.

## 2018-09-18 NOTE — PROGRESS NOTES
Hospitalist Progress Note Enma Vásquez NP Answering service: [phone] OR [phone] from in house phone [phone] Date of Service:  2018 NAME:  Stephanie Soto :  10/17/1932 MRN:  [id number] Admission Summary: Pt presented to the ED with a swollen, red and painful foot. She had been extremely anxious, agitated, combative and uncooperative upon arrival to the ED. According to reports, she had been sent to her PCP office for combative behavior. Pmhx: dementia, Parkinson disease, type 2 diabetes mellitus, chronic pain, peripheral neuropathy Interval history / Subjective:  
Pt up in chair, stomach bothering her as she has been unable to have a BM. Assessment & Plan: Hx Type 2 diabetes mellitus with hyperglycemia, uncontrolled:  
- HgbA1c 6.3 
- accuchecks ordered with SSI. Blood glucose 143-181 
- on carbohydrate controlled diet - 9/15 Januvia increased to 100 mg 
- eating fairly well Metabolic Encephalopathy with agressive behavior and agitation and a hx of dementia:   
-  Metabolic issues have been addressed, now primary issues are dementia with behavioral disturbance 
- could have been exacerbated by UTI/cellulitis 
- was initially sent to ED for TDO eval. Psychiatry has seen and ordered meds - no seen plans to transfer to inpatient psych - Last doses of geodon on . 
- dispo problem due to behavioral issues. Psychiatry saw  and indicates improvement. Pt remains calm and cooperative. On scheduled Haldol. Urinary tract infection:  Completed abx on  
- suspicious for UTI on admission 
- ucx with > 100 K of mixed urogenital dante 
- yeast on UA, none on ucx 
- rcvd rocephin x 2 doses, completed therapy on Keflex for this and cellulitis Cellulitis/nonhealing L foot wound: Completed on  
- Left foot 3-view x-ray showed nonspecific soft tissue swelling with no fx or OM noted - Podiatry consulted - he has seen (though pt not cooperative) and does not believe this to be a surgical issue, continue with wound care. - had 1 dose of IV vancomycin and lost IV 
- treated with kefllex - pain: tylenol prn Leukocytosis: Resolved Rash with superimposed cellulitis: Treated - buttock/sacral/thigh redness 
- wound care has seen, agree with their recommendations Hx Parkinson disease:   
- nortriptyline, galantamine, propranalol at home Constipation:  
- on colace BID 
- added dulcolax po and/or prn Code status: Full DVT prophylaxis: SCDs Care Plan discussed with: nursing Disposition: to be determined -  following and has contacted [place] to come back for a visit. Hospital Problems  Date Reviewed: 9/6/2018 Codes Class Noted POA Leukocytosis ICD-10-CM: A27.961 ICD-9-CM: 288.60  9/6/2018 Unknown Agitation ICD-10-CM: R45.1 ICD-9-CM: 307.9  9/6/2018 Unknown * (Principal)UTI (urinary tract infection) ICD-10-CM: N39.0 ICD-9-CM: 599.0  9/6/2018 Unknown Cellulitis of left foot ICD-10-CM: L03.116 ICD-9-CM: 682.7  9/6/2018 Unknown Cellulitis of multiple sites of buttock ICD-10-CM: L03.317 ICD-9-CM: 682.5  9/6/2018 Unknown Dementia with behavioral disturbance ICD-10-CM: F03.91 
ICD-9-CM: 294.21  9/6/2018 Unknown Review of Systems:  
Denies HA. No chest pain or pressure. No SOB. + constipation. Vital Signs:  
 Last 24hrs VS reviewed since prior progress note. Most recent are: 
Visit Vitals  /54 (BP 1 Location: Left arm, BP Patient Position: At rest)  Pulse 66  Temp 97.9 °F (36.6 °C)  Resp 18  Ht 5' 2\" (1.575 m)  Wt 50.7 kg (111 lb 12.4 oz)  SpO2 99%  Breastfeeding No  
 BMI 20.44 kg/m2 Intake/Output Summary (Last 24 hours) at 09/18/18 1536 Last data filed at 09/18/18 1333 Gross per 24 hour Intake              480 ml Output              350 ml  
 Net              130 ml Physical Examination:  
     
Constitutional:  Elderly female in no distress, very [place] and has vision deficit. ENT:  Oral mucous membranes moist   
Resp:  No accessory muscle use and on RA  
GI: Active bowel sounds, non-tender. BM 9/15 Neurologic:  AA&Ox2, moves all extremities Extremities: Scattered bruises, skin tears. 1-2+ pitting edema in BLE (feet) Skin: Scattered bruises and scabbed areas/skin tears to arms and legs. Data Review:  
Review and/or order of clinical lab test 
Review and/or order of tests in the radiology section of CPT Review and/or order of tests in the medicine section of CPT Labs:  
 
No results for input(s): WBC, HGB, HCT, PLT, HGBEXT, HCTEXT, PLTEXT, HGBEXT, HCTEXT, PLTEXT in the last 72 hours. No results for input(s): NA, K, CL, CO2, BUN, CREA, GLU, CA, MG, PHOS, URICA in the last 72 hours. No results for input(s): SGOT, GPT, ALT, AP, TBIL, TBILI, TP, ALB, GLOB, GGT, AML, LPSE in the last 72 hours. No lab exists for component: AMYP, HLPSE No results for input(s): INR, PTP, APTT in the last 72 hours. No lab exists for component: INREXT, INREXT No results for input(s): FE, TIBC, PSAT, FERR in the last 72 hours. Lab Results Component Value Date/Time Folate 14.9 03/04/2015 02:49 PM  
  
No results for input(s): PH, PCO2, PO2 in the last 72 hours. No results for input(s): CPK, CKNDX, TROIQ in the last 72 hours. No lab exists for component: CPKMB No results found for: CHOL, CHOLX, CHLST, CHOLV, HDL, LDL, LDLC, DLDLP, TGLX, TRIGL, TRIGP, CHHD, CHHDX Lab Results Component Value Date/Time Glucose (POC) 146 (H) 09/18/2018 12:07 PM  
 Glucose (POC) 163 (H) 09/18/2018 08:07 AM  
 Glucose (POC) 181 (H) 09/18/2018 06:33 AM  
 Glucose (POC) 151 (H) 09/17/2018 09:37 PM  
 Glucose (POC) 209 (H) 09/17/2018 04:09 PM  
 
Lab Results Component Value Date/Time  Color YELLOW/STRAW 09/06/2018 07:11 PM  
 Appearance CLOUDY (A) 09/06/2018 07:11 PM  
 Specific gravity 1.015 09/06/2018 07:11 PM  
 pH (UA) 5.5 09/06/2018 07:11 PM  
 Protein NEGATIVE  09/06/2018 07:11 PM  
 Glucose NEGATIVE  09/06/2018 07:11 PM  
 Ketone NEGATIVE  09/06/2018 07:11 PM  
 Bilirubin NEGATIVE  09/06/2018 07:11 PM  
 Urobilinogen 0.2 09/06/2018 07:11 PM  
 Nitrites POSITIVE (A) 09/06/2018 07:11 PM  
 Leukocyte Esterase LARGE (A) 09/06/2018 07:11 PM  
 Epithelial cells FEW 09/06/2018 07:11 PM  
 Bacteria 1+ (A) 09/06/2018 07:11 PM  
 WBC  09/06/2018 07:11 PM  
 RBC 5-10 09/06/2018 07:11 PM  
 
Medications Reviewed:  
 
Current Facility-Administered Medications Medication Dose Route Frequency  bisacodyl (DULCOLAX) tablet 5 mg  5 mg Oral DAILY PRN  
 bisacodyl (DULCOLAX) suppository 10 mg  10 mg Rectal DAILY PRN  
 docusate sodium (COLACE) capsule 100 mg  100 mg Oral BID  SITagliptin (JANUVIA) tablet 100 mg  100 mg Oral DAILY  haloperidol (HALDOL) tablet 1 mg  1 mg Oral TID  haloperidol lactate (HALDOL) injection 1 mg  1 mg IntraMUSCular Q8H PRN  
 haloperidol lactate (HALDOL) injection 2 mg  2 mg IntraMUSCular Q8H PRN  
 amiodarone (CORDARONE) tablet 200 mg  200 mg Oral EVERY OTHER DAY  cholecalciferol (VITAMIN D3) tablet 2,000 Units  2,000 Units Oral DAILY  gabapentin (NEURONTIN) capsule 800 mg  800 mg Oral PCL  gabapentin (NEURONTIN) capsule 1,200 mg  1,200 mg Oral QHS  LORazepam (ATIVAN) tablet 0.5 mg  0.5 mg Oral QHS PRN  polyvinyl alcohol (LIQUIFILM TEARS) 1.4 % ophthalmic solution 1 Drop  1 Drop Both Eyes PRN  propranolol (INDERAL) tablet 20 mg  20 mg Oral QHS  sodium chloride (NS) flush 5-10 mL  5-10 mL IntraVENous PRN  
 glucose chewable tablet 16 g  4 Tab Oral PRN  
 dextrose (D50W) injection syrg 12.5-25 g  25-50 mL IntraVENous PRN  
 glucagon (GLUCAGEN) injection 1 mg  1 mg IntraMUSCular PRN  
 insulin lispro (HUMALOG) injection   SubCUTAneous AC&HS  
  miconazole (SECURA) 2 % extra thick cream   Topical BID  ziprasidone (GEODON) 5 mg in sterile water (preservative free) 0.25 mL injection  5 mg IntraMUSCular Q12H PRN  
______________________________________________________________________ EXPECTED LENGTH OF STAY: 3d 22h ACTUAL LENGTH OF STAY:          [facility],

## 2018-09-18 NOTE — PROGRESS NOTES
Bedside shift change report given to Freddy Shah (oncoming nurse) by Ronald Castellanos RN (offgoing nurse). Report included the following information SBAR. Patient cooperative and pleasant; asking for additional medication to help with moving bowels; agreed to dulcolax suppository; feeding self and allows staff to check vitals; blood sugars, assessments & wound care bandage changes. Night shift nurse states patient slept well and cooperative & pleasant as well.

## 2018-09-18 NOTE — PROGRESS NOTES
Physical Therapy Screening: 
Services are not indicated at this time. An InBasket screening referral was triggered for physical therapy based on results obtained during the nursing admission assessment. The patients chart was reviewed and the patient is not appropriate for a skilled therapy evaluation at this time. Please consult physical therapy if any therapy needs arise. Thank you.  
 
Nolan Osler, PT

## 2018-09-18 NOTE — ADVANCED PRACTICE NURSE
Sleep disorders: Patient in chair. Calm, eyes closing. When questioned if she is sleepy, she says she is closing eyes cause she cannot see well and there is glare from the light. Lights were adjusted. Not much improvement noted. Problems with eating or feeding: Note that patient ate 75 % of lunch and is hydrating. Offered water and ensure but patient refused. Incontinence:Pateint has been using BSC and voiding. Patient complains of constipation. Receiving dulcolax. Confusion: Appears to hear better today. No sitter at the bedside. Patient knows name currently. Not aware of where she is, year, month or day. Calmer today. Still verbalizes feelings of fear especially at night. \"I worry that no one will help me eat. \"  Reassured patient that there will be help with her meals. We discussed having her assist with finger foods and to do as much as she can for herself. Evidence of Falls: Festus score 5 Skin breakdown:  Nathanael score 17 Plan:  HELP visit. Sat at eye level to talk with patient, spoke in calm voice, explained all actions. ie \"Elvira, I need to look in your chart to answer your question about next steps on your transition, is that okay with you that I look in the computer at the recent progress notes? \"  Reassured. Offered large jigsaw puzzle. Patient refused, saying \" I can't see\". Mark Blackburn RN, MSN, [facility], Gerontology [phone]

## 2018-09-19 NOTE — PROGRESS NOTES
Bedside shift change report given to Maryana Barakat RN (oncoming nurse) by Juan Real (offgoing nurse). Report included the following information SBAR and MAR. Night shift reports patient pleasant & cooperative; up to [facility] 3 times w/one assist; patient slept well overnight. Patient agreeable to suppository to help w/ BM.

## 2018-09-19 NOTE — PROGRESS NOTES
Representatives from [facility] will be here to assess patient at [facility] today (per this CM's call from grand daughter Ashwini. CM met with patient this morning to discuss visit. She smiled and appeared happy to have visit this afternoon.   Gilda Rodríguez, STEFANIEW, CRM

## 2018-09-19 NOTE — PROGRESS NOTES
Problem: Falls - Risk of 
Goal: *Absence of Falls Document Criss Christopher Fall Risk and appropriate interventions in the flowsheet. Outcome: Progressing Towards Goal 
Fall Risk Interventions: 
Mobility Interventions: Bed/chair exit alarm, Communicate number of staff needed for ambulation/transfer, Patient to call before getting OOB Mentation Interventions: Adequate sleep, hydration, pain control, Room close to nurse's station, More frequent rounding, Door open when patient unattended Medication Interventions: Bed/chair exit alarm, Patient to call before getting OOB Elimination Interventions: Bed/chair exit alarm, Call light in reach, Patient to call for help with toileting needs History of Falls Interventions: Bed/chair exit alarm, Door open when patient unattended, Room close to nurse's station Problem: Pressure Injury - Risk of 
Goal: *Prevention of pressure injury Document Nathanael Scale and appropriate interventions in the flowsheet. Outcome: Progressing Towards Goal 
Pressure Injury Interventions: 
Sensory Interventions: Keep linens dry and wrinkle-free, Minimize linen layers, Turn and reposition approx. every two hours (pillows and wedges if needed) Moisture Interventions: Absorbent underpads Activity Interventions: Increase time out of bed, Chair cushion Mobility Interventions: Float heels, HOB 30 degrees or less Nutrition Interventions: Document food/fluid/supplement intake Friction and Shear Interventions: Lift sheet, Feet elevated on foot rest

## 2018-09-19 NOTE — PROGRESS NOTES
Hospitalist Progress Note Inga Masterson NP Answering service: [phone] OR [phone] from in house phone [phone] Date of Service:  2018 NAME:  Dawna Díaz :  10/17/1932 MRN:  [id number] Admission Summary: Pt presented to the ED with a swollen, red and painful foot. She had been extremely anxious, agitated, combative and uncooperative upon arrival to the ED. According to reports, she had been sent to her PCP office for combative behavior. Pmhx: dementia, Parkinson disease, type 2 diabetes mellitus, chronic pain, peripheral neuropathy Interval history / Subjective:  
Pt in bed, pleasant and cooperative. Still concerned that she did not have a BM. Nursing indicated a suppository was offered yesterday but pt declined as she wanted to see ho this pill worked first. Pt willing to have suppository now. Assessment & Plan: Hx Type 2 diabetes mellitus with hyperglycemia, uncontrolled:  
- HgbA1c 6.3 
- accuchecks ordered with SSI. Blood glucose 160-267 
- on carbohydrate controlled diet - 9/15 Januvia increased to 100 mg 
- eating fairly well Metabolic Encephalopathy with agressive behavior and agitation and a hx of dementia:   
-  Metabolic issues have been addressed, now primary issues are dementia with behavioral disturbance 
- could have been exacerbated by UTI/cellulitis 
- was initially sent to ED for TDO eval. Psychiatry has seen and ordered meds - no plans to transfer to inpatient psych - Last doses of geodon on . 
- dispo problem due to behavioral issues. Psychiatry saw  and indicates improvement. Pt remains calm and cooperative. On scheduled Haldol and has been cooperative and pleasant Urinary tract infection:  Completed abx on  
- suspicious for UTI on admission 
- ucx with > 100 K of mixed urogenital dante 
- yeast on UA, none on ucx - rcvd rocephin x 2 doses, completed therapy on Keflex for this and cellulitis Cellulitis/nonhealing L foot wound: Completed on 9/12 
- Left foot 3-view x-ray showed nonspecific soft tissue swelling with no fx or OM noted - Podiatry consulted - not a surgical issue, continue with wound care. - had 1 dose of IV vancomycin and lost IV 
- treated with kefllex - pain: tylenol prn Leukocytosis: Resolved Rash with superimposed cellulitis: Treated Hx Parkinson disease:   
- nortriptyline, galantamine, propranalol at home Constipation:  
- on colace BID 
- added dulcolax po and/or prn 
- give suppository today Code status: Full DVT prophylaxis: SCDs Care Plan discussed with: nursing Disposition: to be determined - [place] to come back for a visit this afternoon Hospital Problems  Date Reviewed: 9/6/2018 Codes Class Noted POA Leukocytosis ICD-10-CM: X09.663 ICD-9-CM: 288.60  9/6/2018 Unknown Agitation ICD-10-CM: R45.1 ICD-9-CM: 307.9  9/6/2018 Unknown * (Principal)UTI (urinary tract infection) ICD-10-CM: N39.0 ICD-9-CM: 599.0  9/6/2018 Unknown Cellulitis of left foot ICD-10-CM: L03.116 ICD-9-CM: 682.7  9/6/2018 Unknown Cellulitis of multiple sites of buttock ICD-10-CM: L03.317 ICD-9-CM: 682.5  9/6/2018 Unknown Dementia with behavioral disturbance ICD-10-CM: F03.91 
ICD-9-CM: 294.21  9/6/2018 Unknown Review of Systems:  
Denies HA. No chest pain or pressure. No SOB. + constipation. Vital Signs:  
 Last 24hrs VS reviewed since prior progress note. Most recent are: 
Visit Vitals  /73 (BP 1 Location: Right arm, BP Patient Position: At rest)  Pulse 60  Temp 97.6 °F (36.4 °C)  Resp 18  Ht 5' 2\" (1.575 m)  Wt 50.7 kg (111 lb 12.4 oz)  SpO2 92%  Breastfeeding No  
 BMI 20.44 kg/m2 No intake or output data in the 24 hours ending 09/19/18 1410 Physical Examination: Constitutional:  Elderly female in no distress, very [place] and has vision deficit. ENT:  Oral mucous membranes moist   
CV: Heart rate regular Resp:  No accessory muscle use and on RA  
GI: Active bowel sounds, non-tender. BM 9/15 Neurologic:  AA&Ox2, moves all extremities Extremities: Scattered bruises, skin tears. 1-2+ pitting edema in BLE (feet) Skin: Scattered bruises and scabbed areas/skin tears to arms and legs. Data Review:  
Review and/or order of clinical lab test 
Review and/or order of tests in the radiology section of CPT Review and/or order of tests in the medicine section of CPT Labs:  
 
No results for input(s): WBC, HGB, HCT, PLT, HGBEXT, HCTEXT, PLTEXT, HGBEXT, HCTEXT, PLTEXT in the last 72 hours. No results for input(s): NA, K, CL, CO2, BUN, CREA, GLU, CA, MG, PHOS, URICA in the last 72 hours. No results for input(s): SGOT, GPT, ALT, AP, TBIL, TBILI, TP, ALB, GLOB, GGT, AML, LPSE in the last 72 hours. No lab exists for component: AMYP, HLPSE No results for input(s): INR, PTP, APTT in the last 72 hours. No lab exists for component: INREXT, INREXT No results for input(s): FE, TIBC, PSAT, FERR in the last 72 hours. Lab Results Component Value Date/Time Folate 14.9 03/04/2015 02:49 PM  
  
No results for input(s): PH, PCO2, PO2 in the last 72 hours. No results for input(s): CPK, CKNDX, TROIQ in the last 72 hours. No lab exists for component: CPKMB No results found for: CHOL, CHOLX, CHLST, CHOLV, HDL, LDL, LDLC, DLDLP, TGLX, TRIGL, TRIGP, CHHD, CHHDX Lab Results Component Value Date/Time Glucose (POC) 267 (H) 09/19/2018 11:06 AM  
 Glucose (POC) 160 (H) 09/19/2018 07:04 AM  
 Glucose (POC) 238 (H) 09/18/2018 09:28 PM  
 Glucose (POC) 179 (H) 09/18/2018 04:01 PM  
 Glucose (POC) 146 (H) 09/18/2018 12:07 PM  
 
Lab Results Component Value Date/Time  Color YELLOW/STRAW 09/06/2018 07:11 PM  
 Appearance CLOUDY (A) 09/06/2018 07:11 PM  
 Specific gravity 1.015 09/06/2018 07:11 PM  
 pH (UA) 5.5 09/06/2018 07:11 PM  
 Protein NEGATIVE  09/06/2018 07:11 PM  
 Glucose NEGATIVE  09/06/2018 07:11 PM  
 Ketone NEGATIVE  09/06/2018 07:11 PM  
 Bilirubin NEGATIVE  09/06/2018 07:11 PM  
 Urobilinogen 0.2 09/06/2018 07:11 PM  
 Nitrites POSITIVE (A) 09/06/2018 07:11 PM  
 Leukocyte Esterase LARGE (A) 09/06/2018 07:11 PM  
 Epithelial cells FEW 09/06/2018 07:11 PM  
 Bacteria 1+ (A) 09/06/2018 07:11 PM  
 WBC  09/06/2018 07:11 PM  
 RBC 5-10 09/06/2018 07:11 PM  
 
Medications Reviewed:  
 
Current Facility-Administered Medications Medication Dose Route Frequency  magnesium hydroxide (MILK OF MAGNESIA) 400 mg/5 mL oral suspension 30 mL  30 mL Oral DAILY PRN  
 bisacodyl (DULCOLAX) suppository 10 mg  10 mg Rectal NOW  
 bisacodyl (DULCOLAX) tablet 5 mg  5 mg Oral DAILY PRN  
 bisacodyl (DULCOLAX) suppository 10 mg  10 mg Rectal DAILY PRN  
 docusate sodium (COLACE) capsule 100 mg  100 mg Oral BID  SITagliptin (JANUVIA) tablet 100 mg  100 mg Oral DAILY  haloperidol (HALDOL) tablet 1 mg  1 mg Oral TID  haloperidol lactate (HALDOL) injection 1 mg  1 mg IntraMUSCular Q8H PRN  
 haloperidol lactate (HALDOL) injection 2 mg  2 mg IntraMUSCular Q8H PRN  
 amiodarone (CORDARONE) tablet 200 mg  200 mg Oral EVERY OTHER DAY  cholecalciferol (VITAMIN D3) tablet 2,000 Units  2,000 Units Oral DAILY  gabapentin (NEURONTIN) capsule 800 mg  800 mg Oral PCL  gabapentin (NEURONTIN) capsule 1,200 mg  1,200 mg Oral QHS  LORazepam (ATIVAN) tablet 0.5 mg  0.5 mg Oral QHS PRN  polyvinyl alcohol (LIQUIFILM TEARS) 1.4 % ophthalmic solution 1 Drop  1 Drop Both Eyes PRN  propranolol (INDERAL) tablet 20 mg  20 mg Oral QHS  sodium chloride (NS) flush 5-10 mL  5-10 mL IntraVENous PRN  
 glucose chewable tablet 16 g  4 Tab Oral PRN  
 dextrose (D50W) injection syrg 12.5-25 g  25-50 mL IntraVENous PRN  
  glucagon (GLUCAGEN) injection 1 mg  1 mg IntraMUSCular PRN  
 insulin lispro (HUMALOG) injection   SubCUTAneous AC&HS  miconazole (SECURA) 2 % extra thick cream   Topical BID  ziprasidone (GEODON) 5 mg in sterile water (preservative free) 0.25 mL injection  5 mg IntraMUSCular Q12H PRN  
______________________________________________________________________ EXPECTED LENGTH OF STAY: 3d 22h ACTUAL LENGTH OF STAY:          Tae Valadez 32, NP

## 2018-09-19 NOTE — PROGRESS NOTES
Pt appropriate this afternoon - keeps asking when [facility] is coming to talk w her.   Pt up in chair watching tv and eating her meal 
 
Pt ringing call bell asking for help to get back in bed - pt does not want suppository now bc its too late and getting close to bedtime - she asked if she can get in am

## 2018-09-20 NOTE — PROGRESS NOTES
Problem: Falls - Risk of 
Goal: *Absence of Falls Document Rubén Qiu Fall Risk and appropriate interventions in the flowsheet. Outcome: Progressing Towards Goal 
Fall Risk Interventions: 
Mobility Interventions: Bed/chair exit alarm Mentation Interventions: Bed/chair exit alarm Medication Interventions: Bed/chair exit alarm Elimination Interventions: Bed/chair exit alarm History of Falls Interventions: Door open when patient unattended

## 2018-09-20 NOTE — PROGRESS NOTES
Problem: Falls - Risk of 
Goal: *Absence of Falls Document Marisol Cohn Fall Risk and appropriate interventions in the flowsheet. Outcome: Progressing Towards Goal 
Fall Risk Interventions: 
Mobility Interventions: Bed/chair exit alarm, Communicate number of staff needed for ambulation/transfer, Patient to call before getting OOB Mentation Interventions: Bed/chair exit alarm, Door open when patient unattended, Evaluate medications/consider consulting pharmacy, Eyeglasses and hearing aids, More frequent rounding, Room close to nurse's station Medication Interventions: Bed/chair exit alarm Elimination Interventions: Bed/chair exit alarm, Call light in reach, Patient to call for help with toileting needs, Toilet paper/wipes in reach, Toileting schedule/hourly rounds History of Falls Interventions: Bed/chair exit alarm, Consult care management for discharge planning, Door open when patient unattended, Evaluate medications/consider consulting pharmacy, Room close to nurse's station Problem: Pressure Injury - Risk of 
Goal: *Prevention of pressure injury Document Nathanael Scale and appropriate interventions in the flowsheet. Pressure Injury Interventions: 
Sensory Interventions: Assess changes in LOC, Avoid rigorous massage over bony prominences, Chair cushion, Check visual cues for pain, Float heels Moisture Interventions: Absorbent underpads, Apply protective barrier, creams and emollients, Limit adult briefs Activity Interventions: Chair cushion, Increase time out of bed, Pressure redistribution bed/mattress(bed type), PT/OT evaluation Mobility Interventions: Chair cushion, Float heels, HOB 30 degrees or less, Pressure redistribution bed/mattress (bed type), PT/OT evaluation, Turn and reposition approx. every two hours(pillow and wedges) Nutrition Interventions: Document food/fluid/supplement intake Friction and Shear Interventions: Apply protective barrier, creams and emollients, HOB 30 degrees or less, Lift sheet, Minimize layers

## 2018-09-20 NOTE — ACP (ADVANCE CARE PLANNING)
[facility] scheduled earlier today (4pm) to re-assess patient. She stated she had a delay earlier at the facility and called Marilee De Leon [facility]) to give update re cancellation. However,  She will be here today at 11:00am 
 
CM informed  patient and her grand daughter (Ashwini) of updates. Patient will need a CXR or PPD per admission criteria for admission to care home. Will continue to follow.  Ana Price, INGRID, CRM

## 2018-09-20 NOTE — PROGRESS NOTES
Bedside shift change report given to Keely (oncoming nurse) by Candido Danielle (offgoing nurse). Report included the following information SBAR, Kardex and MAR.

## 2018-09-20 NOTE — PROGRESS NOTES
Bedside and Verbal shift change report given to Riccardo Thurston (oncoming nurse) by Laury Blake (offgoing nurse). Report included the following information SBAR, MAR and Recent Results.

## 2018-09-20 NOTE — PROGRESS NOTES
[facility] representative Jessiehollie Carvajal) met with this CM and patient earlier today. Interview and assessment went well. CM awaiting CRX results for today. Patient is favorable for admission pending cxr results. Family to be contacted by this CM to discuss updates for possible d/c to facility Friday or over the weekend. INGRID Beltrán, CRM 
 
CM spoke with patient's granddaughter to advise of updates. Facility will be contacted her to discus financial pieces of the admission. 4:36p  CM received call from Ashwini. She states she has been in communication with Nicole and they are looking a 2 housing options (memory care suite vs an apartment). Ashwini states she has to get a moving company to move patient's belongings from [facility] to the [place] location. She also states the patient's  is out of town and he handles writing checks for housing payments. He will be back in town Monday and can get to the facility Tuesday morning to make the payment transaction. CM asked Ashwini of the contract(s) can be emailed or faxed to the  (as the patient has been medically ready for d/c? She states she will ask they  if he can speed up the process by email or fax. Ashwini (granddaughter) states she is working full time and is trying to transact business for her grandmother while at work. CM responded that it was understood that she is trying to work and take care of her grandmother's affairs and that her efforts are appreciated. It is also understood that the patient's son had dropped everything regarding the patient's housing on Ashwini as he had determined that coordinating care and housing had for the patient had become burdensome. TITI asked that Ashwini return call regarding updates for the followin. Definitive housing location in the [facility] [employer], 2. 's ability to sign any contracts via email or fax, 3.  Date apt furniture will be transported from [facility] to "[employer]. CM received orders for home health PT/OT and skilled nursing visits for wound care. Referral sent to [facility].   INGRID Levin, CRM

## 2018-09-20 NOTE — PROGRESS NOTES
Bedside shift change report given to Keely (oncoming nurse) by Camelia Melara (offgoing nurse). Report included the following information SBAR, Kardex and MAR.

## 2018-09-20 NOTE — PROGRESS NOTES
Problem: Mobility Impaired (Adult and Pediatric) Goal: *Acute Goals and Plan of Care (Insert Text) Physical Therapy Goals Initiated 9/20/2018 1. Patient will move from supine to sit and sit to supine  and roll side to side in bed with supervision/set-up within 7 day(s). 2.  Patient will transfer from bed to chair and chair to bed with supervision/set-up using the least restrictive device within 7 day(s). 3.  Patient will perform sit to stand with supervision/set-up within 7 day(s). 4.  Patient will ambulate with supervision/set-up for 100 feet with the least restrictive device within 7 day(s). physical Therapy EVALUATION Patient: Tri Pacheco (89 y.o. female) Date: 9/20/2018 Primary Diagnosis: UTI (urinary tract infection) Agitation Dementia with behavioral disturbance Cellulitis of left foot Cellulitis of multiple sites of buttock Leukocytosis UTI (urinary tract infection) Agitation Dementia with behavioral disturbance Cellulitis of left foot Cellulitis of multiple sites of buttock Leukocytosis Precautions:     
 
ASSESSMENT : 
Based on the objective data described below, the patient presents with overall decreased mobility as noted in bed mobility, transfers and gait. She is requiring assist to transition from supine to sitting EOB and for sit to stand. Ambulated short distance in room with rollator (broken brake) and returned to bed as she had been up all morning. Patient was pleasant and cooperative throughout session. She will benefit from SNF rehab to maximize her mobility and independence. Neha Riser Patient will benefit from skilled intervention to address the above impairments. Patients rehabilitation potential is considered to be Good Factors which may influence rehabilitation potential include:  
[x]         None noted 
[]         Mental ability/status []         Medical condition 
[]         Home/family situation and support systems 
[]         Safety awareness []         Pain tolerance/management 
[]         Other: PLAN : 
Recommendations and Planned Interventions: 
[x]           Bed Mobility Training             []    Neuromuscular Re-Education 
[x]           Transfer Training                   []    Orthotic/Prosthetic Training 
[x]           Gait Training                         []    Modalities [x]           Therapeutic Exercises           []    Edema Management/Control 
[x]           Therapeutic Activities            [x]    Patient and Family Training/Education 
[]           Other (comment): Frequency/Duration: Patient will be followed by physical therapy  5 times a week to address goals. Discharge Recommendations: Macho Torres Further Equipment Recommendations for Discharge: none (will need re placement or fixing of her rollator at some time) SUBJECTIVE:  
Patient stated I think my eyesight has a lot to do with how I'm doing.  OBJECTIVE DATA SUMMARY:  
HISTORY:   
Past Medical History:  
Diagnosis Date  Chronic pain Peripheral neuropathy  Dementia  Diabetes ([facility] [address]  Parkinson's disease ([facility] [address] Past Surgical History:  
Procedure Laterality Date [address] UNLISTED  2011 Umbilical Hernia repair  CARDIAC SURG PROCEDURE UNLIST  4/2014  
 valve replacement Prior Level of Function/Home Situation: ambulatory with rollator Personal factors and/or comorbidities impacting plan of care: psych history Home Situation Home Environment: Independent living # Steps to Enter: 0 One/Two Story Residence: Other (Comment) (multi-level facilty; lives on second floor) # of Interior Steps: 0 Interior Rails: None Lift Chair Available: No 
Living Alone: Yes Support Systems: Friends \ neighbors Patient Expects to be Discharged to[de-identified] Unknown Current DME Used/Available at Home: Farida Loveless, rollator, Wheelchair, [address] chair EXAMINATION/PRESENTATION/DECISION MAKING:  
Critical Behavior: Neurologic State: Alert, Appropriate for age Orientation Level: Oriented to person, Oriented to situation Cognition: Appropriate decision making, Appropriate for age attention/concentration, Appropriate safety awareness, Follows commands Hearing: Auditory Auditory Impairment: Hard of hearing, bilateral 
Skin:  See nursing notes Edema: left foot edema Range Of Motion: 
AROM: Generally decreased, functional 
  
  
  
PROM: Generally decreased, functional 
  
  
  
Strength:   
Strength: Generally decreased, functional 
  
  
  
  
  
  
Tone & Sensation:  
Tone: Normal 
  
  
  
  
Sensation: Intact Coordination: 
Coordination: Generally decreased, functional 
Vision:  
  
Functional Mobility: 
Bed Mobility: 
  
Supine to Sit: Moderate assistance Sit to Supine: Minimum assistance Scooting: Moderate assistance Transfers: 
Sit to Stand: Moderate assistance Stand to Sit: Minimum assistance Balance:  
Sitting: Intact Standing: Impaired; With support Standing - Static: Fair Standing - Dynamic : Fair Ambulation/Gait Training: 
Distance (ft): 20 Feet (ft) Assistive Device: Gait belt;Walker, rollator Ambulation - Level of Assistance: Minimal assistance Gait Description (WDL): Exceptions to [facility] Gait Abnormalities: Decreased step clearance Base of Support: Narrowed Speed/Noemy: Shuffled;Pace decreased (<100 feet/min) Step Length: Right shortened;Left shortened Functional Measure: 
Tinetti test: 
 
Sitting Balance: 1 Arises: 0 Attempts to Rise: 0 Immediate Standing Balance: 1 Standing Balance: 1 Nudged: 0 Eyes Closed: 0 Turn 360 Degrees - Continuous/Discontinuous: 0 Turn 360 Degrees - Steady/Unsteady: 0 Sitting Down: 1 Balance Score: 4 Indication of Gait: 0 
R Step Length/Height: 0 
L Step Length/Height: 0 
R Foot Clearance: 0 
L Foot Clearance: 0 Step Symmetry: 1 Step Continuity: 1 Path: 1 Trunk: 0 Walking Time: 1 Gait Score: 4 Total Score: 8 Tinetti Test and G-code impairment scale: 
Percentage of Impairment CH 
 
0% 
 CI 
 
1-19% CJ 
 
20-39% CK 
 
40-59% CL 
 
60-79% CM 
 
80-99% CN  
 
100% Tinetti Score 0-28 28 23-27 17-22 12-16 6-11 1-5 0 Tinetti Tool Score Risk of Falls 
<19 = High Fall Risk 19-24 = Moderate Fall Risk 25-28 = Low Fall Risk Tinetti ME. Performance-Oriented Assessment of Mobility Problems in Elderly Patients. [address]; [id number]. (Scoring Description: PT Bulletin Feb. 10, 1993) Older adults: Baron Maya et al, 2009; n = [address] elderly evaluated with ABC, GÉNESIS, ADL, and IADL) · Mean GÉNESIS score for males aged 69-68 years = 26.21(3.40) · Mean GÉNESIS score for females age 69-68 years = 25.16(4.30) · Mean GÉNESIS score for males over 80 years = 23.29(6.02) · Mean GÉNESIS score for females over 80 years = 17.20(8.32) G codes: In compliance with CMSs Claims Based Outcome Reporting, the following G-code set was chosen for this patient based on their primary functional limitation being treated: The outcome measure chosen to determine the severity of the functional limitation was the Tinetti with a score of 8/28 which was correlated with the impairment scale. ? Mobility - Walking and Moving Around:  
  - CURRENT STATUS: CM - 80%-99% impaired, limited or restricted  - GOAL STATUS: CL - 60%-79% impaired, limited or restricted  - D/C STATUS:  ---------------To be determined--------------- Physical Therapy Evaluation Charge Determination History Examination Presentation Decision-Making MEDIUM  Complexity : 1-2 comorbidities / personal factors will impact the outcome/ POC  LOW Complexity : 1-2 Standardized tests and measures addressing body structure, function, activity limitation and / or participation in recreation  LOW Complexity : Stable, uncomplicated  Other outcome measures Tinetti  HIGH   
  
 Based on the above components, the patient evaluation is determined to be of the following complexity level: LOW After treatment:  
[]         Patient left in no apparent distress sitting up in chair 
[x]         Patient left in no apparent distress in bed 
[x]         Call bell left within reach [x]         Nursing notified 
[]         Caregiver present [x]         Bed alarm activated COMMUNICATION/EDUCATION:  
The patients plan of care was discussed with: Registered Nurse and . [x]         Fall prevention education was provided and the patient/caregiver indicated understanding. [x]         Patient/family have participated as able in goal setting and plan of care. [x]         Patient/family agree to work toward stated goals and plan of care. []         Patient understands intent and goals of therapy, but is neutral about his/her participation. []         Patient is unable to participate in goal setting and plan of care. Thank you for this referral. 
Fabrice Quach, PT Time Calculation: 20 mins

## 2018-09-20 NOTE — PROGRESS NOTES
PT Note: This Anton Daughters spoke with therapist who evaluated this patient today, Senia Rankin. Therapist unable to addend note at this time but is recommending HHPT at an [facility]. Will follow up tomorrow as needed. Thank you.

## 2018-09-20 NOTE — PROGRESS NOTES
Hospitalist Progress Note Heber Awad NP Answering service: [phone] OR [phone] from in house phone [phone] Date of Service:  2018 NAME:  Lee Ann Spicer :  10/17/1932 MRN:  [id number] Admission Summary: Pt presented to the ED with a swollen, red and painful foot. She had been extremely anxious, agitated, combative and uncooperative upon arrival to the ED. According to reports, she had been sent to her PCP office for combative behavior. Pmhx: dementia, Parkinson disease, type 2 diabetes mellitus, chronic pain, peripheral neuropathy Interval history / Subjective:  
Pt in bed, pleasant and cooperative. Had a BM today with digital stimulation - will continue dulcolax Assessment & Plan: Hx Type 2 diabetes mellitus with hyperglycemia, uncontrolled:  
- HgbA1c 6.3 
- accuchecks ordered with SSI. Blood glucose 129-267 
- on carbohydrate controlled diet - 9/15 Januvia increased to 100 mg 
- eating fairly well Metabolic Encephalopathy with agressive behavior and agitation and a hx of dementia:   
-  Metabolic issues have been addressed, now primary issues are dementia with behavioral disturbance 
- could have been exacerbated by UTI/cellulitis 
- was initially sent to ED for TDO eval. Psychiatry has seen and ordered meds - no plans to transfer to inpatient psych - Last doses of geodon on . 
- dispo problem due to behavioral issues. Psychiatry saw  and indicates improvement. Pt remains calm and cooperative. On scheduled Haldol and has been cooperative and pleasant Urinary tract infection:  Completed abx on  
- suspicious for UTI on admission 
- ucx with > 100 K of mixed urogenital dante 
- yeast on UA, none on ucx 
- rcvd rocephin x 2 doses, completed therapy on Keflex for this and cellulitis Cellulitis/nonhealing L foot wound: Completed on  - Left foot 3-view x-ray showed nonspecific soft tissue swelling with no fx or OM noted - Podiatry consulted - not a surgical issue, continue with wound care. - had 1 dose of IV vancomycin and lost IV 
- treated with kefllex - pain: tylenol prn Leukocytosis: Resolved Rash with superimposed cellulitis: Treated Hx Parkinson disease:   
- nortriptyline, galantamine, propranalol at home Constipation:  
- on colace BID 
- added dulcolax po and/or suppository 
- added daily scheduled senna Code status: Full DVT prophylaxis: SCDs Care Plan discussed with: nursing, patient and  Disposition: [facility] when able Hospital Problems  Date Reviewed: 9/6/2018 Codes Class Noted POA Leukocytosis ICD-10-CM: S09.466 ICD-9-CM: 288.60  9/6/2018 Unknown Agitation ICD-10-CM: R45.1 ICD-9-CM: 307.9  9/6/2018 Unknown * (Principal)UTI (urinary tract infection) ICD-10-CM: N39.0 ICD-9-CM: 599.0  9/6/2018 Unknown Cellulitis of left foot ICD-10-CM: L03.116 ICD-9-CM: 682.7  9/6/2018 Unknown Cellulitis of multiple sites of buttock ICD-10-CM: L03.317 ICD-9-CM: 682.5  9/6/2018 Unknown Dementia with behavioral disturbance ICD-10-CM: F03.91 
ICD-9-CM: 294.21  9/6/2018 Unknown Review of Systems:  
Denies HA. No chest pain or pressure. No SOB. + constipation (with some relief) Vital Signs:  
 Last 24hrs VS reviewed since prior progress note. Most recent are: 
Visit Vitals  BP 99/54 (BP 1 Location: Left arm, BP Patient Position: At rest;Sitting)  Pulse 72  Temp 98.5 °F (36.9 °C)  Resp 18  Ht 5' 2\" (1.575 m)  Wt 50.7 kg (111 lb 12.4 oz)  SpO2 95%  Breastfeeding No  
 BMI 20.44 kg/m2 Intake/Output Summary (Last 24 hours) at 09/20/18 1450 Last data filed at 09/20/18 1253 Gross per 24 hour Intake              240 ml Output              500 ml Net             -260 ml Physical Examination: Constitutional:  Elderly female in no distress, very [place] and has vision deficit. ENT:  Oral mucous membranes moist   
CV: Heart rate regular Resp:  No accessory muscle use and on RA  
GI: Active bowel sounds, non-tender. BM 9/20 Neurologic:  AA&Ox2, moves all extremities Extremities: Scattered bruises, skin tears. 1-2+ pitting edema in BLE (feet) Skin: Scattered bruises and scabbed areas/skin tears to arms and legs. Data Review:  
Review and/or order of clinical lab test 
Review and/or order of tests in the radiology section of CPT Review and/or order of tests in the medicine section of CPT Labs:  
 
No results for input(s): WBC, HGB, HCT, PLT, HGBEXT, HCTEXT, PLTEXT, HGBEXT, HCTEXT, PLTEXT in the last 72 hours. No results for input(s): NA, K, CL, CO2, BUN, CREA, GLU, CA, MG, PHOS, URICA in the last 72 hours. No results for input(s): SGOT, GPT, ALT, AP, TBIL, TBILI, TP, ALB, GLOB, GGT, AML, LPSE in the last 72 hours. No lab exists for component: AMYP, HLPSE No results for input(s): INR, PTP, APTT in the last 72 hours. No lab exists for component: INREXT, INREXT No results for input(s): FE, TIBC, PSAT, FERR in the last 72 hours. Lab Results Component Value Date/Time Folate 14.9 03/04/2015 02:49 PM  
  
No results for input(s): PH, PCO2, PO2 in the last 72 hours. No results for input(s): CPK, CKNDX, TROIQ in the last 72 hours. No lab exists for component: CPKMB No results found for: CHOL, CHOLX, CHLST, CHOLV, HDL, LDL, LDLC, DLDLP, TGLX, TRIGL, TRIGP, CHHD, CHHDX Lab Results Component Value Date/Time Glucose (POC) 186 (H) 09/20/2018 06:30 AM  
 Glucose (POC) 172 (H) 09/19/2018 09:59 PM  
 Glucose (POC) 129 (H) 09/19/2018 04:46 PM  
 Glucose (POC) 267 (H) 09/19/2018 11:06 AM  
 Glucose (POC) 160 (H) 09/19/2018 07:04 AM  
 
Lab Results Component Value Date/Time  Color YELLOW/STRAW 09/06/2018 07:11 PM  
 Appearance CLOUDY (A) 09/06/2018 07:11 PM  
 Specific gravity 1.015 09/06/2018 07:11 PM  
 pH (UA) 5.5 09/06/2018 07:11 PM  
 Protein NEGATIVE  09/06/2018 07:11 PM  
 Glucose NEGATIVE  09/06/2018 07:11 PM  
 Ketone NEGATIVE  09/06/2018 07:11 PM  
 Bilirubin NEGATIVE  09/06/2018 07:11 PM  
 Urobilinogen 0.2 09/06/2018 07:11 PM  
 Nitrites POSITIVE (A) 09/06/2018 07:11 PM  
 Leukocyte Esterase LARGE (A) 09/06/2018 07:11 PM  
 Epithelial cells FEW 09/06/2018 07:11 PM  
 Bacteria 1+ (A) 09/06/2018 07:11 PM  
 WBC  09/06/2018 07:11 PM  
 RBC 5-10 09/06/2018 07:11 PM  
 
Medications Reviewed:  
 
Current Facility-Administered Medications Medication Dose Route Frequency  magnesium hydroxide (MILK OF MAGNESIA) 400 mg/5 mL oral suspension 30 mL  30 mL Oral DAILY PRN  
 bisacodyl (DULCOLAX) tablet 5 mg  5 mg Oral DAILY PRN  
 bisacodyl (DULCOLAX) suppository 10 mg  10 mg Rectal DAILY PRN  
 docusate sodium (COLACE) capsule 100 mg  100 mg Oral BID  SITagliptin (JANUVIA) tablet 100 mg  100 mg Oral DAILY  haloperidol (HALDOL) tablet 1 mg  1 mg Oral TID  haloperidol lactate (HALDOL) injection 1 mg  1 mg IntraMUSCular Q8H PRN  
 haloperidol lactate (HALDOL) injection 2 mg  2 mg IntraMUSCular Q8H PRN  
 amiodarone (CORDARONE) tablet 200 mg  200 mg Oral EVERY OTHER DAY  cholecalciferol (VITAMIN D3) tablet 2,000 Units  2,000 Units Oral DAILY  gabapentin (NEURONTIN) capsule 800 mg  800 mg Oral PCL  gabapentin (NEURONTIN) capsule 1,200 mg  1,200 mg Oral QHS  LORazepam (ATIVAN) tablet 0.5 mg  0.5 mg Oral QHS PRN  polyvinyl alcohol (LIQUIFILM TEARS) 1.4 % ophthalmic solution 1 Drop  1 Drop Both Eyes PRN  propranolol (INDERAL) tablet 20 mg  20 mg Oral QHS  sodium chloride (NS) flush 5-10 mL  5-10 mL IntraVENous PRN  
 glucose chewable tablet 16 g  4 Tab Oral PRN  
 dextrose (D50W) injection syrg 12.5-25 g  25-50 mL IntraVENous PRN  
 glucagon (GLUCAGEN) injection 1 mg  1 mg IntraMUSCular PRN  
  insulin lispro (HUMALOG) injection   SubCUTAneous AC&HS  miconazole (SECURA) 2 % extra thick cream   Topical BID  ziprasidone (GEODON) 5 mg in sterile water (preservative free) 0.25 mL injection  5 mg IntraMUSCular Q12H PRN  
______________________________________________________________________ EXPECTED LENGTH OF STAY: 3d 22h ACTUAL LENGTH OF STAY:          Ened[place] 2, NP

## 2018-09-21 NOTE — PROGRESS NOTES
Orders received, chart reviewed and patient evaluated by occupational therapy. Recommend patient to discharge to [facility] with HHOT/PT pending progression with skilled acute occupational therapy. Recommend with nursing patient to complete as able in order to maintain strength, endurance and independence: OOB to chair 3x/day, ADLs with supervision/setup and mobilizing to the bathroom or [facility] for toileting with 1-2 assist. Thank you for your assistance. Full evaluation to follow. Israel Reynolds Poster, MS, OTR/L

## 2018-09-21 NOTE — PROGRESS NOTES
Hospitalist Progress Note Kyleigh Mckeon NP Answering service: [phone] OR 2012 from in house phone [phone] Date of Service:  2018 NAME:  Jorge Luis Wolff :  10/17/1932 MRN:  [id number] Admission Summary: Pt presented to the ED with a swollen, red and painful foot. She had been extremely anxious, agitated, combative and uncooperative upon arrival to the ED. According to reports, she had been sent to her PCP office for combative behavior. Pmhx: dementia, Parkinson disease, type 2 diabetes mellitus, chronic pain, peripheral neuropathy Interval history / Subjective:  
Pt in bed, pleasant and cooperative. Had several BMs today per nursing. Assessment & Plan: Hx Type 2 diabetes mellitus with hyperglycemia, uncontrolled:  
- HgbA1c 6.3 
- accuchecks ordered with SSI. Blood glucose 131-209 
- on carbohydrate controlled diet - 9/15 Januvia increased to 100 mg 
- eating fairly well Metabolic Encephalopathy with agressive behavior and agitation and a hx of dementia:   
-  Metabolic issues have been addressed, now primary issues are dementia with behavioral disturbance 
- could have been exacerbated by UTI/cellulitis 
- was initially sent to ED for TDO eval. Psychiatry has seen and ordered meds - no plans to transfer to inpatient psych - Last doses of geodon on . 
- dispo problem due to behavioral issues. Psychiatry saw  and indicates improvement. Pt remains calm and cooperative. On scheduled Haldol and has been cooperative and pleasant Urinary tract infection:  Completed abx on  
- suspicious for UTI on admission 
- ucx with > 100 K of mixed urogenital dante 
- yeast on UA, none on ucx 
- rcvd rocephin x 2 doses, completed therapy on Keflex for this and cellulitis Cellulitis/nonhealing L foot wound: Completed on  
- Left foot 3-view x-ray showed nonspecific soft tissue swelling with no fx or OM noted - Podiatry consulted - not a surgical issue, continue with wound care. - had 1 dose of IV vancomycin and lost IV 
- treated with kefllex - pain: tylenol prn Leukocytosis: Resolved Rash with superimposed cellulitis: Treated Hx Parkinson disease: nortriptyline, galantamine, propranalol at home Constipation:  
- on colace BID and daily senna 
- may have dulcolax po and/or suppository Code status: Full DVT prophylaxis: SCDs Care Plan discussed with: nursing, patient/family and  Disposition: [facility] when able but likely not to leave until next week. Medically clear. Hospital Problems  Date Reviewed: 9/6/2018 Codes Class Noted POA Leukocytosis ICD-10-CM: K40.968 ICD-9-CM: 288.60  9/6/2018 Unknown Agitation ICD-10-CM: R45.1 ICD-9-CM: 307.9  9/6/2018 Unknown * (Principal)UTI (urinary tract infection) ICD-10-CM: N39.0 ICD-9-CM: 599.0  9/6/2018 Unknown Cellulitis of left foot ICD-10-CM: L03.116 ICD-9-CM: 682.7  9/6/2018 Unknown Cellulitis of multiple sites of buttock ICD-10-CM: L03.317 ICD-9-CM: 682.5  9/6/2018 Unknown Dementia with behavioral disturbance ICD-10-CM: F03.91 
ICD-9-CM: 294.21  9/6/2018 Unknown Review of Systems:  
Denies HA. No chest pain or pressure. No SOB. +BM Vital Signs:  
 Last 24hrs VS reviewed since prior progress note. Most recent are: 
Visit Vitals  /70 (BP 1 Location: Right arm, BP Patient Position: At rest;Sitting)  Pulse 76  Temp 97.8 °F (36.6 °C)  Resp 18  Ht 5' 2\" (1.575 m)  Wt 50.7 kg (111 lb 12.4 oz)  SpO2 97%  Breastfeeding No  
 BMI 20.44 kg/m2 Intake/Output Summary (Last 24 hours) at 09/21/18 1622 Last data filed at 09/21/18 1347 Gross per 24 hour Intake                0 ml Output              200 ml Net             -200 ml Physical Examination: Constitutional:  Elderly female in no distress, very [place] and has vision deficit. ENT:  Oral mucous membranes moist   
Resp:  No accessory muscle use and on RA  
GI: BM 9/21 Neurologic:  AA&Ox2, moves all extremities Extremities: Scattered bruises, skin tears. 1-2+ pitting edema in BLE (feet) Skin: Scattered bruises and scabbed areas/skin tears to arms and legs. Dressings to feet and legs are intact Data Review:  
Review and/or order of clinical lab test 
Review and/or order of tests in the radiology section of CPT Review and/or order of tests in the medicine section of CPT Labs:  
 
No results for input(s): WBC, HGB, HCT, PLT, HGBEXT, HCTEXT, PLTEXT, HGBEXT, HCTEXT, PLTEXT in the last 72 hours. No results for input(s): NA, K, CL, CO2, BUN, CREA, GLU, CA, MG, PHOS, URICA in the last 72 hours. No results for input(s): SGOT, GPT, ALT, AP, TBIL, TBILI, TP, ALB, GLOB, GGT, AML, LPSE in the last 72 hours. No lab exists for component: AMYP, HLPSE No results for input(s): INR, PTP, APTT in the last 72 hours. No lab exists for component: INREXT, INREXT No results for input(s): FE, TIBC, PSAT, FERR in the last 72 hours. Lab Results Component Value Date/Time Folate 14.9 03/04/2015 02:49 PM  
  
No results for input(s): PH, PCO2, PO2 in the last 72 hours. No results for input(s): CPK, CKNDX, TROIQ in the last 72 hours. No lab exists for component: CPKMB No results found for: CHOL, CHOLX, CHLST, CHOLV, HDL, LDL, LDLC, DLDLP, TGLX, TRIGL, TRIGP, CHHD, CHHDX Lab Results Component Value Date/Time Glucose (POC) 209 (H) 09/21/2018 04:04 PM  
 Glucose (POC) 131 (H) 09/21/2018 12:07 PM  
 Glucose (POC) 161 (H) 09/21/2018 06:19 AM  
 Glucose (POC) 142 (H) 09/20/2018 09:36 PM  
 Glucose (POC) 150 (H) 09/20/2018 04:29 PM  
 
Lab Results Component Value Date/Time  Color YELLOW/STRAW 09/06/2018 07:11 PM  
 Appearance CLOUDY (A) 09/06/2018 07:11 PM  
 Specific gravity 1.015 09/06/2018 07:11 PM  
 pH (UA) 5.5 09/06/2018 07:11 PM  
 Protein NEGATIVE  09/06/2018 07:11 PM  
 Glucose NEGATIVE  09/06/2018 07:11 PM  
 Ketone NEGATIVE  09/06/2018 07:11 PM  
 Bilirubin NEGATIVE  09/06/2018 07:11 PM  
 Urobilinogen 0.2 09/06/2018 07:11 PM  
 Nitrites POSITIVE (A) 09/06/2018 07:11 PM  
 Leukocyte Esterase LARGE (A) 09/06/2018 07:11 PM  
 Epithelial cells FEW 09/06/2018 07:11 PM  
 Bacteria 1+ (A) 09/06/2018 07:11 PM  
 WBC  09/06/2018 07:11 PM  
 RBC 5-10 09/06/2018 07:11 PM  
 
Medications Reviewed:  
 
Current Facility-Administered Medications Medication Dose Route Frequency  collagenase (SANTYL) 250 unit/gram ointment   Topical DAILY  senna (SENOKOT) tablet 8.6 mg  1 Tab Oral QHS  polyethylene glycol (MIRALAX) packet 17 g  17 g Oral DAILY  magnesium hydroxide (MILK OF MAGNESIA) 400 mg/5 mL oral suspension 30 mL  30 mL Oral DAILY PRN  
 bisacodyl (DULCOLAX) tablet 5 mg  5 mg Oral DAILY PRN  
 bisacodyl (DULCOLAX) suppository 10 mg  10 mg Rectal DAILY PRN  
 docusate sodium (COLACE) capsule 100 mg  100 mg Oral BID  SITagliptin (JANUVIA) tablet 100 mg  100 mg Oral DAILY  haloperidol (HALDOL) tablet 1 mg  1 mg Oral TID  haloperidol lactate (HALDOL) injection 1 mg  1 mg IntraMUSCular Q8H PRN  
 haloperidol lactate (HALDOL) injection 2 mg  2 mg IntraMUSCular Q8H PRN  
 amiodarone (CORDARONE) tablet 200 mg  200 mg Oral EVERY OTHER DAY  cholecalciferol (VITAMIN D3) tablet 2,000 Units  2,000 Units Oral DAILY  gabapentin (NEURONTIN) capsule 800 mg  800 mg Oral PCL  gabapentin (NEURONTIN) capsule 1,200 mg  1,200 mg Oral QHS  LORazepam (ATIVAN) tablet 0.5 mg  0.5 mg Oral QHS PRN  polyvinyl alcohol (LIQUIFILM TEARS) 1.4 % ophthalmic solution 1 Drop  1 Drop Both Eyes PRN  propranolol (INDERAL) tablet 20 mg  20 mg Oral QHS  sodium chloride (NS) flush 5-10 mL  5-10 mL IntraVENous PRN  
  glucose chewable tablet 16 g  4 Tab Oral PRN  
 dextrose (D50W) injection syrg 12.5-25 g  25-50 mL IntraVENous PRN  
 glucagon (GLUCAGEN) injection 1 mg  1 mg IntraMUSCular PRN  
 insulin lispro (HUMALOG) injection   SubCUTAneous AC&HS  miconazole (SECURA) 2 % extra thick cream   Topical BID  ziprasidone (GEODON) 5 mg in sterile water (preservative free) 0.25 mL injection  5 mg IntraMUSCular Q12H PRN  
______________________________________________________________________ EXPECTED LENGTH OF STAY: 3d 22h ACTUAL LENGTH OF STAY:          [address], NP

## 2018-09-21 NOTE — PROGRESS NOTES
Bedside and Verbal shift change report given to [address] [place] [address] St (oncoming nurse) by  Margret Esquivel (offgoing nurse). Report included the following information SBAR, Kardex, Intake/Output, MAR, Accordion and Recent Results.

## 2018-09-21 NOTE — PROGRESS NOTES
Naseem OSUNA (Jay Gagnon, KURTIS) called to advise that patient has been approved for home health SN,PT,OT. She will be here in about 20 minutes. INGRID Cisneros, CRM

## 2018-09-21 NOTE — PROGRESS NOTES
NUTRITION COMPLETE ASSESSMENT 
 
RECOMMENDATIONS:  
1. Continue diet as ordered with ONS 2. Weekly weight Interventions/Plan:  
Food/Nutrient Delivery: Ensure Enlive BID, no other questions, requests or concerns Assessment:  
Reason for Assessment:  
[x]Reassessment Diet: Consistent carb 1800 kcal; Ensure Enlive BID Nutritionally Significant Medications: [x] Reviewed & Includes: Vitamin D3 2000IU; humalog correction scale (high sensitivity);  [place] daily Meal Intake:  
Patient Vitals for the past 100 hrs: 
 % Diet Eaten  
09/20/18 1253 75 %  
09/20/18 0900 100 % 09/19/18 1230 100 % 09/19/18 0950 100 % 09/18/18 1333 75 % 09/18/18 0829 75 % Pre-Hospitalization: 
Usual Appetite: Poor Diet at Home: Regular Vitamins/Supplements: No 
 
Current Hospitalization:  
Fluid Restriction:  N/A Appetite: Good PO Ability: Independent Subjective: 
Pt asleep when RD visited. Objective: 
Chart reviewed, discussed with RN. RN reports pt has been eating % of all her meals since she has been caring for her (last 2-3 days). Pt's intake documentation reflective of this. She is able to feed herself and consumes Ensure Enlive supplements. These are providing 700 kcal, 40 g protein per day-- meeting 59% and 82% of estimated kcal and protein needs, respectively. No new weight to assess. Estimated Nutrition Needs:  
Kcals/day: 1177 Kcals/day (7838-9037 kcal/day (MSJ x 1.3-1.4)) Protein: 49 g (1.3 g/kg) Fluid: 1200 ml (1 mL/kcal) Based On: [place] 1898 Weight Used: Actual wt (50.7 kg) Pt expected to meet estimated nutrient needs:  [x]   Yes     []  No [] Unable to predict at this time Nutrition Diagnosis:  
1. Inadequate protein-energy intake related to mental status, decreased appetite as evidenced by 18# weight loss x 2 months and pt admits minimal intake PTA - appetite improving Goals:   
 Pt to consume at least 50% of all meals and 1 supplement per day over the next 5-7 days Monitoring & Evaluation: - Total energy intake, Liquid meal replacement - Weight/weight change Previous Nutrition Goals Met:   Progressing Previous Recommendations:    Progressing Education & Discharge Needs: 
 [x] None Identified 
 [] Identified and addressed [x] Participated in care plan, discharge planning, and/or interdisciplinary rounds Cultural, Christianity and ethnic food preferences identified: None Skin Integrity: []Intact  [x]Other: pressure injury Edema: [x]None []Other Last BM:9/20/18 - large BM, needed assist from RN Food Allergies: [x]None []Other Diet Restrictions: Cultural/Protestant Preference(s): None Anthropometrics:   
Weight Loss Metrics 9/7/2018 6/22/2018 12/17/2017 4/14/2017 4/8/2017 3/28/2017 3/2/2017 Today's Wt 111 lb 12.4 oz 130 lb 130 lb 126 lb 130 lb 130 lb 140 lb BMI 20.44 kg/m2 21.63 kg/m2 21.63 kg/m2 23.05 kg/m2 23.78 kg/m2 23.78 kg/m2 25.61 kg/m2 Weight Source: Bed Height: 5' 2\" (157.5 cm), Body mass index is 20.44 kg/(m^2). IBW : 49.9 kg (110 lb), Usual Body Weight: 58.1 kg (128 lb),   
 
Labs:   
Lab Results Component Value Date/Time Sodium 140 09/08/2018 04:46 AM  
 Potassium 5.1 09/08/2018 04:46 AM  
 Chloride 106 09/08/2018 04:46 AM  
 CO2 23 09/08/2018 04:46 AM  
 Glucose 132 (H) 09/08/2018 04:46 AM  
 BUN 12 09/08/2018 04:46 AM  
 Creatinine 0.78 09/08/2018 04:46 AM  
 Calcium 9.2 09/08/2018 04:46 AM  
 Magnesium 1.9 12/17/2017 07:55 PM  
 Albumin 3.6 06/22/2018 09:32 PM  
 
Lab Results Component Value Date/Time  Hemoglobin A1c 6.3 09/08/2018 04:46 AM  
 
Logan Prajapati RD, MS, CDE

## 2018-09-21 NOTE — PROGRESS NOTES
Problem: Self Care Deficits Care Plan (Adult) Goal: *Acute Goals and Plan of Care (Insert Text) Occupational Therapy Goals Initiated 9/21/2018 1. Patient will perform grooming sitting on EOB with supervision/set-up within 7 day(s). 2.  Patient will perform upper body ADLs sitting on EOB with supervision/set-up within 7 day(s). 3.  Patient will perform lower body ADLs sitting on EOB with minimal assistance within 7 day(s). 4.  Patient will perform toilet transfers with minimal assistance/contact guard assist within 7 day(s). 5.  Patient will perform all aspects of toileting with minimal assistance/contact guard assist within 7 day(s). 6.  Patient will participate in upper extremity therapeutic exercise/activities with supervision/set-up for 5 minutes within 7 day(s). 7.  Patient will utilize energy conservation techniques during functional activities with verbal cues within 7 day(s). Occupational Therapy EVALUATION Patient: Sandrita Goel (73 y.o. female) Date: 9/21/2018 Primary Diagnosis: UTI (urinary tract infection) Agitation Dementia with behavioral disturbance Cellulitis of left foot Cellulitis of multiple sites of buttock Leukocytosis UTI (urinary tract infection) Agitation Dementia with behavioral disturbance Cellulitis of left foot Cellulitis of multiple sites of buttock Leukocytosis Precautions:  Fall ASSESSMENT : 
Based on the objective data described below, the patient presents with overall min-mod A for bed mobility, mod A for functional mobility, min A-s/u for upper body ADLs and mod A for lower body ADLs s/p admission for UTI and agitation. Patient has hx of psych, dementia, Parkinson's and of macular degeneration.  Today, patient ADLs limited by volition, self limiting behavior, impaired balance, generalized weakness, impaired cognition (sequencing, safety awareness, etc), visual deficits, and decreased functional activity tolerance. Patient would benefit from [facility]) once medically stable in order to address the above deficits and to maximize functional independence. Recommend with nursing patient to complete as able in order to maintain strength, endurance and independence: ADLs with supervision/setup, OOB to chair 3x/day and mobilizing to the [facility] for toileting with 2 assist. Thank you for your assistance. Patient will benefit from skilled intervention to address the above impairments. Patients rehabilitation potential is considered to be Fair Factors which may influence rehabilitation potential include:  
[]             None noted []             Mental ability/status []             Medical condition []             Home/family situation and support systems []             Safety awareness []             Pain tolerance/management 
[]             Other: PLAN : 
Recommendations and Planned Interventions: 
[x]               Self Care Training                  [x]        Therapeutic Activities [x]               Functional Mobility Training    []        Cognitive Retraining 
[x]               Therapeutic Exercises           [x]        Endurance Activities [x]               Balance Training                   []        Neuromuscular Re-Education []               Visual/Perceptual Training     [x]   Home Safety Training 
[x]               Patient Education                 [x]        Family Training/Education []               Other (comment): Frequency/Duration: Patient will be followed by occupational therapy 5 times a week to address goals. Discharge Recommendations: Home Health OT Further Equipment Recommendations for Discharge: TBD by [facility] - likely none SUBJECTIVE:  
Patient stated I don't want to, it bothers me.  (referring to keeping eyes open during evaluation) OBJECTIVE DATA SUMMARY:  
HISTORY:  
Past Medical History:  
Diagnosis Date  Chronic pain Peripheral neuropathy  Dementia  Diabetes ([facility])  Parkinson's disease ([facility]) Past Surgical History:  
Procedure Laterality Date [address] UNLISTED  2011 Umbilical Hernia repair  CARDIAC SURG PROCEDURE UNLIST  4/2014  
 valve replacement Prior Level of Function/Environment/Context: Patient reports living in 1 level apartment (on 2nd floor with elevator) and being mod I with self care and functional mobility. Facility completes IADLs and meal prep for patient. Patient has grand daughter who is involved in her care. Patient uses a rollator at baseline. Home Situation Home Environment: Independent living # Steps to Enter: 0 One/Two Story Residence: One story (multi level, lived on 2nd floor) # of Interior Steps: 0 Interior Rails: None Lift Chair Available: No 
Living Alone: Yes Support Systems: Family member(s), Friends \ neighbors Patient Expects to be Discharged to[de-identified] Unknown Current DME Used/Available at Home: Belita Gallop, rollator, Shower chair, Grab bars Tub or Shower Type: Shower Hand dominance: Right EXAMINATION OF PERFORMANCE DEFICITS: 
Cognitive/Behavioral Status: 
Neurologic State: Alert Orientation Level: Oriented to person;Oriented to place; Disoriented to situation;Disoriented to time Cognition: Decreased attention/concentration; Follows commands; Impaired decision making;Poor safety awareness Perception: Appears intact Perseveration: No perseveration noted Safety/Judgement: Awareness of environment;Decreased awareness of need for assistance;Decreased awareness of need for safety;Decreased insight into deficits; Fall prevention Skin: notes wounds on BLE, otherwise grossly intact Edema: none noted in BUEs Hearing: Auditory Auditory Impairment: Hard of hearing, bilateral 
 
Vision/Perceptual:   
Tracking: Unable to test secondary due to decreased visual attention (patient minimall opening eyes) Diplopia: No   
 Acuity: Impaired near vision; Impaired far vision (macular degeneration) Corrective Lenses: Glasses Range of Motion: In [address] AROM: Generally decreased, functional 
 
Strength: In [address] [address]  equal 4/5 Strength: Generally decreased, functional 
 
Coordination: 
Coordination: Generally decreased, functional 
Fine Motor Skills-Upper: Left Intact; Right Intact Gross Motor Skills-Upper: Left Intact; Right Intact Tone & Sensation: [place] [address] [address] Tone: Normal 
Sensation: Intact Balance: 
Sitting: Impaired Sitting - Static: Good (unsupported) Sitting - Dynamic: Fair (occasional) Standing: Impaired; With support Standing - Static: Fair Standing - Dynamic : Fair Functional Mobility and Transfers for ADLs: 
Bed Mobility: 
Supine to Sit: Moderate assistance Sit to Supine: Moderate assistance Scooting: Moderate assistance Transfers: 
Sit to Stand: Moderate assistance Stand to Sit: Minimum assistance Toilet Transfer : Moderate assistance; Additional time (Infer per obs of functional mobility, balance, strength) ADL Assessment: 
Feeding: Setup* Oral Facial Hygiene/Grooming: Minimum assistance* Bathing: Minimum assistance* Upper Body Dressing: Minimum assistance* Lower Body Dressing: Moderate assistance Toileting: Moderate assistance* 
 
*Infer per obs of functional mobility, functional reach, BUE ROM, strength, balance and cognition ADL Intervention and task modifications: 
 
Lower Body Dressing Assistance Socks: Minimum assistance Leg Crossed Method Used: No 
Position Performed: Seated edge of bed Cues: Doff; Don 
 
Cognitive Retraining Safety/Judgement: Awareness of environment;Decreased awareness of need for assistance;Decreased awareness of need for safety;Decreased insight into deficits; Fall prevention Functional Measure: 
Barthel Index: 
 
Bathin Bladder: 5 Bowels: 10 
Groomin Dressin Feedin Mobility: 5 Stairs: 0 Toilet Use: 5 Transfer (Bed to Chair and Back): 5 Total: 40 Barthel and G-code impairment scale: 
Percentage of impairment CH 
0% CI 
1-19% CJ 
20-39% CK 
40-59% CL 
60-79% CM 
80-99% CN 
100% Barthel Score 0-100 100 99-80 79-60 59-40 20-39 1-19 
 0 Barthel Score 0-20 20 17-19 13-16 9-12 5-8 1-4 0 The Barthel ADL Index: Guidelines 1. The index should be used as a record of what a patient does, not as a record of what a patient could do. 2. The main aim is to establish degree of independence from any help, physical or verbal, however minor and for whatever reason. 3. The need for supervision renders the patient not independent. 4. A patient's performance should be established using the best available evidence. Asking the patient, friends/relatives and nurses are the usual sources, but direct observation and common sense are also important. However direct testing is not needed. 5. Usually the patient's performance over the preceding 24-48 hours is important, but occasionally longer periods will be relevant. 6. Middle categories imply that the patient supplies over 50 per cent of the effort. 7. Use of aids to be independent is allowed. Sher Salvador., Barthel, D.W. (3517). Functional evaluation: the Barthel Index. [address] [facility] (14)2. BOBBY Rapp, Thony Alberto., Sondra Grace., [place], [address] [place] [address] (1999). Measuring the change indisability after inpatient rehabilitation; comparison of the responsiveness of the Barthel Index and Functional [place] Measure. Journal of Neurology, Neurosurgery, and Psychiatry, 66(4), 409-522. Kalyan Kenyon, N.J.A, FRANCIS Resendiz, & Lin Davis MVALERIANO. (2004.) Assessment of post-stroke quality of life in cost-effectiveness studies: The usefulness of the Barthel Index and the EuroQoL-5D. [facility], 13, 601-02 G codes: In compliance with CMSs Claims Based Outcome Reporting, the following G-code set was chosen for this patient based on their primary functional limitation being treated: The outcome measure chosen to determine the severity of the functional limitation was the Barthel Index with a score of 40/100 which was correlated with the impairment scale. ? Self Care:  
  - CURRENT STATUS: CK - 40%-59% impaired, limited or restricted  - GOAL STATUS: CJ - 20%-39% impaired, limited or restricted  - D/C STATUS:  ---------------To be determined--------------- Occupational Therapy Evaluation Charge Determination History Examination Decision-Making LOW Complexity : Brief history review  LOW Complexity : 1-3 performance deficits relating to physical, cognitive , or psychosocial skils that result in activity limitations and / or participation restrictions  MEDIUM Complexity : Patient may present with comorbidities that affect occupational performnce. Miniml to moderate modification of tasks or assistance (eg, physical or verbal ) with assesment(s) is necessary to enable patient to complete evaluation Based on the above components, the patient evaluation is determined to be of the following complexity level: LOW Activity Tolerance:  
Fair, VSS Please refer to the flowsheet for vital signs taken during this treatment. After treatment:  
[] Patient left in no apparent distress sitting up in chair 
[x] Patient left in no apparent distress in bed 
[x] Call bell left within reach [x] Nursing notified 
[] Caregiver present [x] Bed alarm activated COMMUNICATION/EDUCATION:  
The patients plan of care was discussed with: Physical Therapist and Registered Nurse. [x] Home safety education was provided and the patient/caregiver indicated understanding. [x] Patient/family have participated as able in goal setting and plan of care. [] Patient/family agree to work toward stated goals and plan of care. [] Patient understands intent and goals of therapy, but is neutral about his/her participation. [] Patient is unable to participate in goal setting and plan of care. This patients plan of care is appropriate for delegation to [facility]. Thank you for this referral. 
Rashida Pedersen OT Time Calculation: 23 mins

## 2018-09-22 NOTE — PROGRESS NOTES
Hospitalist Progress Note Verenice Velasco NP Answering service: [phone] OR 0867 from in house phone [phone] Date of Service:  2018 NAME:  Olu Olivarez :  10/17/1932 MRN:  [id number] Admission Summary: Pt presented to the ED with a swollen, red and painful foot. She had been extremely anxious, agitated, combative and uncooperative upon arrival to the ED. According to reports, she had been sent to her PCP office for combative behavior. Pmhx: dementia, Parkinson disease, type 2 diabetes mellitus, chronic pain, peripheral neuropathy Interval history / Subjective:  
Pt up in chair. Pleasant and cooperative. Was visited by her granddaughter and great granddaughter today. Assessment & Plan: Hx Type 2 diabetes mellitus with hyperglycemia, uncontrolled:  
- HgbA1c 6.3 
- accuchecks ordered with SSI. Blood glucose 131-209 
- on carbohydrate controlled diet - 9/15 Januvia increased to 100 mg 
- eating fairly well Metabolic Encephalopathy with agressive behavior and agitation and a hx of dementia:   
-  Metabolic issues have been addressed, now primary issues are dementia with behavioral disturbance 
- could have been exacerbated by UTI/cellulitis 
- was initially sent to ED for TDO eval. Psychiatry has seen and ordered meds - no plans to transfer to inpatient psych - Last doses of geodon on . 
- dispo problem due to behavioral issues. Psychiatry saw  and indicates improvement. - On scheduled Haldol and has been cooperative and pleasant Urinary tract infection:  Completed abx on  
- suspicious for UTI on admission 
- ucx with > 100 K of mixed urogenital dante 
- yeast on UA, none on ucx 
- rcvd rocephin x 2 doses, completed therapy on Keflex for this and cellulitis Cellulitis/nonhealing L foot wound: - Left foot 3-view x-ray showed nonspecific soft tissue swelling with no fx or OM noted - Podiatry consulted - not a surgical issue, continue with wound care. - had 1 dose of IV vancomycin and lost IV 
- treated with kefllex - pain: tylenol prn Leukocytosis: Resolved Rash with superimposed cellulitis: Treated Hx Parkinson disease: nortriptyline, galantamine, propranalol at home Constipation:  
- on colace BID and daily senna 
- may have dulcolax po and/or suppository 
- had multiple BMs 9/21 - not sure pt recalls this Code status: Full DVT prophylaxis: SCDs Care Plan discussed with: nursing, patient Disposition: [facility] assisted living when able but not able to leave until next week (Tuesday or Wednesday). Remains medically clear. Hospital Problems  Date Reviewed: 9/6/2018 Codes Class Noted POA Leukocytosis ICD-10-CM: T67.838 ICD-9-CM: 288.60  9/6/2018 Unknown Agitation ICD-10-CM: R45.1 ICD-9-CM: 307.9  9/6/2018 Unknown * (Principal)UTI (urinary tract infection) ICD-10-CM: N39.0 ICD-9-CM: 599.0  9/6/2018 Unknown Cellulitis of left foot ICD-10-CM: L03.116 ICD-9-CM: 682.7  9/6/2018 Unknown Cellulitis of multiple sites of buttock ICD-10-CM: L03.317 ICD-9-CM: 682.5  9/6/2018 Unknown Dementia with behavioral disturbance ICD-10-CM: F03.91 
ICD-9-CM: 294.21  9/6/2018 Unknown Review of Systems:  
Denies HA. No chest pain or pressure. No SOB. +BM Vital Signs:  
 Last 24hrs VS reviewed since prior progress note. Most recent are: 
Visit Vitals  /63 (BP 1 Location: Right arm, BP Patient Position: Sitting)  Pulse 64  Temp 98 °F (36.7 °C)  Resp 18  Ht 5' 2\" (1.575 m)  Wt 50 kg (110 lb 3.7 oz)  SpO2 98%  Breastfeeding No  
 BMI 20.16 kg/m2 No intake or output data in the 24 hours ending 09/22/18 1350 Physical Examination:  
     
Constitutional:  Elderly female in no distress, very [place] and has vision deficit.    
ENT:  Oral mucous membranes moist   
 Resp:  No accessory muscle use and on RA  
GI: BM 9/21 Neurologic:  AA&Ox2, moves all extremities Extremities: Scattered bruises, skin tears. 1-2+ pitting edema in BLE (feet) Skin: Scattered bruises and scabbed areas/skin tears to arms and legs. Dressings to feet and legs are intact Data Review:  
Review and/or order of clinical lab test 
Review and/or order of tests in the radiology section of CPT Review and/or order of tests in the medicine section of CPT Labs:  
 
No results for input(s): WBC, HGB, HCT, PLT, HGBEXT, HCTEXT, PLTEXT, HGBEXT, HCTEXT, PLTEXT in the last 72 hours. No results for input(s): NA, K, CL, CO2, BUN, CREA, GLU, CA, MG, PHOS, URICA in the last 72 hours. No results for input(s): SGOT, GPT, ALT, AP, TBIL, TBILI, TP, ALB, GLOB, GGT, AML, LPSE in the last 72 hours. No lab exists for component: AMYP, HLPSE No results for input(s): INR, PTP, APTT in the last 72 hours. No lab exists for component: INREXT, INREXT No results for input(s): FE, TIBC, PSAT, FERR in the last 72 hours. Lab Results Component Value Date/Time Folate 14.9 03/04/2015 02:49 PM  
  
No results for input(s): PH, PCO2, PO2 in the last 72 hours. No results for input(s): CPK, CKNDX, TROIQ in the last 72 hours. No lab exists for component: CPKMB No results found for: CHOL, CHOLX, CHLST, CHOLV, HDL, LDL, LDLC, DLDLP, TGLX, TRIGL, TRIGP, CHHD, CHHDX Lab Results Component Value Date/Time Glucose (POC) 166 (H) 09/22/2018 11:11 AM  
 Glucose (POC) 206 (H) 09/22/2018 06:40 AM  
 Glucose (POC) 161 (H) 09/21/2018 09:06 PM  
 Glucose (POC) 209 (H) 09/21/2018 04:04 PM  
 Glucose (POC) 131 (H) 09/21/2018 12:07 PM  
 
Lab Results Component Value Date/Time  Color YELLOW/STRAW 09/06/2018 07:11 PM  
 Appearance CLOUDY (A) 09/06/2018 07:11 PM  
 Specific gravity 1.015 09/06/2018 07:11 PM  
 pH (UA) 5.5 09/06/2018 07:11 PM  
 Protein NEGATIVE  09/06/2018 07:11 PM  
 Glucose NEGATIVE  09/06/2018 07:11 PM  
 Ketone NEGATIVE  09/06/2018 07:11 PM  
 Bilirubin NEGATIVE  09/06/2018 07:11 PM  
 Urobilinogen 0.2 09/06/2018 07:11 PM  
 Nitrites POSITIVE (A) 09/06/2018 07:11 PM  
 Leukocyte Esterase LARGE (A) 09/06/2018 07:11 PM  
 Epithelial cells FEW 09/06/2018 07:11 PM  
 Bacteria 1+ (A) 09/06/2018 07:11 PM  
 WBC  09/06/2018 07:11 PM  
 RBC 5-10 09/06/2018 07:11 PM  
 
Medications Reviewed:  
 
Current Facility-Administered Medications Medication Dose Route Frequency  acetaminophen (TYLENOL) tablet 650 mg  650 mg Oral Q6H PRN  
 collagenase (SANTYL) 250 unit/gram ointment   Topical DAILY  senna (SENOKOT) tablet 8.6 mg  1 Tab Oral QHS  polyethylene glycol (MIRALAX) packet 17 g  17 g Oral DAILY  magnesium hydroxide (MILK OF MAGNESIA) 400 mg/5 mL oral suspension 30 mL  30 mL Oral DAILY PRN  
 bisacodyl (DULCOLAX) tablet 5 mg  5 mg Oral DAILY PRN  
 bisacodyl (DULCOLAX) suppository 10 mg  10 mg Rectal DAILY PRN  
 docusate sodium (COLACE) capsule 100 mg  100 mg Oral BID  SITagliptin (JANUVIA) tablet 100 mg  100 mg Oral DAILY  haloperidol (HALDOL) tablet 1 mg  1 mg Oral TID  haloperidol lactate (HALDOL) injection 1 mg  1 mg IntraMUSCular Q8H PRN  
 haloperidol lactate (HALDOL) injection 2 mg  2 mg IntraMUSCular Q8H PRN  
 amiodarone (CORDARONE) tablet 200 mg  200 mg Oral EVERY OTHER DAY  cholecalciferol (VITAMIN D3) tablet 2,000 Units  2,000 Units Oral DAILY  gabapentin (NEURONTIN) capsule 800 mg  800 mg Oral PCL  gabapentin (NEURONTIN) capsule 1,200 mg  1,200 mg Oral QHS  LORazepam (ATIVAN) tablet 0.5 mg  0.5 mg Oral QHS PRN  polyvinyl alcohol (LIQUIFILM TEARS) 1.4 % ophthalmic solution 1 Drop  1 Drop Both Eyes PRN  propranolol (INDERAL) tablet 20 mg  20 mg Oral QHS  sodium chloride (NS) flush 5-10 mL  5-10 mL IntraVENous PRN  
 glucose chewable tablet 16 g  4 Tab Oral PRN  
  dextrose (D50W) injection syrg 12.5-25 g  25-50 mL IntraVENous PRN  
 glucagon (GLUCAGEN) injection 1 mg  1 mg IntraMUSCular PRN  
 insulin lispro (HUMALOG) injection   SubCUTAneous AC&HS  miconazole (SECURA) 2 % extra thick cream   Topical BID  ziprasidone (GEODON) 5 mg in sterile water (preservative free) 0.25 mL injection  5 mg IntraMUSCular Q12H PRN  
______________________________________________________________________ EXPECTED LENGTH OF STAY: 3d 22h ACTUAL LENGTH OF STAY:          Moreno Daly 37, NP

## 2018-09-22 NOTE — PROGRESS NOTES
Spiritual Care Partner Volunteer visited patient in room 615/01 on 9.22.18. Documented by: : Rev. Aubrey Henderson. Althea Castellanos; [facility], to contact [phone] Jonas Davies call: [phone]

## 2018-09-22 NOTE — PROGRESS NOTES
Bedside shift change report given to eLanne RN (oncoming nurse) by Jc RN (offgoing nurse). Report included the following information SBAR, Kardex and Recent Results. 1933  Patient refused wound care on legs, got agitated, swatted at my hands and did not let me take her slippers off. Leanne Simental

## 2018-09-22 NOTE — PROGRESS NOTES
Bedside and Verbal shift change report given to [address] [place] [address] (oncoming nurse) by Aaliyah Travis (offgoing nurse). Report included the following information SBAR, Kardex, Intake/Output, MAR, Recent Results and Med Rec Status.

## 2018-09-23 NOTE — PROGRESS NOTES
Hospitalist Progress Note Wing [place]indoo, NP Answering service: [phone] OR [phone] from in house phone [phone] Date of Service:  2018 NAME:  Iris Mandel :  10/17/1932 MRN:  [id number] Admission Summary: Pt presented to the ED with a swollen, red and painful foot. She had been extremely anxious, agitated, combative and uncooperative upon arrival to the ED. According to reports, she had been sent to her PCP office for combative behavior. Pmhx: dementia, Parkinson disease, type 2 diabetes mellitus, chronic pain, peripheral neuropathy Interval history / Subjective:  
Pt in bed, feels as though no one is paying attention to her. Has her call bell in reach and says been pressing button but no one comes. Notably, her call bell light was not on nor was it ringing. Assessment & Plan: Hx Type 2 diabetes mellitus with hyperglycemia, uncontrolled:  
- HgbA1c 6.3 
- accuchecks ordered with SSI. Blood glucose 140-168 
- on carbohydrate controlled diet - 9/15 Januvia increased to 100 mg 
- eating fairly well Metabolic Encephalopathy with agressive behavior and agitation and a hx of dementia:   
-  Metabolic issues have been addressed, now primary issues are dementia with behavioral disturbance 
- could have been exacerbated by UTI/cellulitis 
- was initially sent to ED for TDO eval. Psychiatry has seen and ordered meds - no plans to transfer to inpatient psych - Last doses of geodon on . 
- dispo problem due to behavioral issues. Psychiatry saw  and indicates improvement. - On scheduled Haldol and has been cooperative and pleasant Urinary tract infection:  Completed abx on  
- suspicious for UTI on admission 
- ucx with > 100 K of mixed urogenital dante 
- yeast on UA, none on ucx 
- rcvd rocephin x 2 doses, completed therapy on Keflex for this and cellulitis Cellulitis/nonhealing L foot wound: - Left foot 3-view x-ray showed nonspecific soft tissue swelling with no fx or OM noted - Podiatry consulted - not a surgical issue, continue with wound care. - had 1 dose of IV vancomycin and lost IV 
- treated with kefllex - pain: tylenol prn Leukocytosis: Resolved Rash with superimposed cellulitis: Treated Hx Parkinson disease: nortriptyline, galantamine, propranalol at home Constipation:  
- on colace BID and daily senna 
- may have dulcolax po and/or suppository 
- had multiple BMs 9/21 - not sure pt recalls this Code status: Full DVT prophylaxis: SCDs Care Plan discussed with: nursing, patient Disposition: [facility] assisted living when able but not able to leave until next week (Tuesday or Wednesday). Remains medically clear. Hospital Problems  Date Reviewed: 9/6/2018 Codes Class Noted POA Leukocytosis ICD-10-CM: A62.722 ICD-9-CM: 288.60  9/6/2018 Unknown Agitation ICD-10-CM: R45.1 ICD-9-CM: 307.9  9/6/2018 Unknown * (Principal)UTI (urinary tract infection) ICD-10-CM: N39.0 ICD-9-CM: 599.0  9/6/2018 Unknown Cellulitis of left foot ICD-10-CM: L03.116 ICD-9-CM: 682.7  9/6/2018 Unknown Cellulitis of multiple sites of buttock ICD-10-CM: L03.317 ICD-9-CM: 682.5  9/6/2018 Unknown Dementia with behavioral disturbance ICD-10-CM: F03.91 
ICD-9-CM: 294.21  9/6/2018 Unknown Review of Systems:  
Denies HA. No chest pain or pressure. No SOB. No abdominal complaints Vital Signs:  
 Last 24hrs VS reviewed since prior progress note. Most recent are: 
Visit Vitals  /72 (BP 1 Location: Right arm, BP Patient Position: At rest)  Pulse 77  Temp 98 °F (36.7 °C)  Resp 16  
 Ht 5' 2\" (1.575 m)  Wt 50 kg (110 lb 3.7 oz)  SpO2 100%  Breastfeeding No  
 BMI 20.16 kg/m2 Intake/Output Summary (Last 24 hours) at 09/23/18 1205 Last data filed at 09/23/18 9906 Gross per 24 hour Intake              240 ml  
 Output              250 ml Net              -10 ml Physical Examination:  
     
Constitutional:  Elderly female in no distress, very [place] and has vision deficit. ENT:  Oral mucous membranes moist   
Resp:  No accessory muscle use and on RA  
GI: BM 9/21 Neurologic:  AA&Ox2, moves all extremities Extremities: Scattered bruises, skin tears. 1+ pitting edema in BLE (feet) Skin: Scattered bruises and scabbed areas/skin tears to arms and legs. Psych: Calm and cooperative Data Review:  
Review and/or order of clinical lab test 
Review and/or order of tests in the radiology section of CPT Review and/or order of tests in the medicine section of CPT Labs:  
 
No results for input(s): WBC, HGB, HCT, PLT, HGBEXT, HCTEXT, PLTEXT, HGBEXT, HCTEXT, PLTEXT in the last 72 hours. No results for input(s): NA, K, CL, CO2, BUN, CREA, GLU, CA, MG, PHOS, URICA in the last 72 hours. No results for input(s): SGOT, GPT, ALT, AP, TBIL, TBILI, TP, ALB, GLOB, GGT, AML, LPSE in the last 72 hours. No lab exists for component: AMYP, HLPSE No results for input(s): INR, PTP, APTT in the last 72 hours. No lab exists for component: INREXT, INREXT No results for input(s): FE, TIBC, PSAT, FERR in the last 72 hours. Lab Results Component Value Date/Time Folate 14.9 03/04/2015 02:49 PM  
  
No results for input(s): PH, PCO2, PO2 in the last 72 hours. No results for input(s): CPK, CKNDX, TROIQ in the last 72 hours. No lab exists for component: CPKMB No results found for: CHOL, CHOLX, CHLST, CHOLV, HDL, LDL, LDLC, DLDLP, TGLX, TRIGL, TRIGP, CHHD, CHHDX Lab Results Component Value Date/Time Glucose (POC) 159 (H) 09/23/2018 11:24 AM  
 Glucose (POC) 151 (H) 09/23/2018 06:33 AM  
 Glucose (POC) 140 (H) 09/22/2018 09:19 PM  
 Glucose (POC) 168 (H) 09/22/2018 04:06 PM  
 Glucose (POC) 166 (H) 09/22/2018 11:11 AM  
 
Lab Results Component Value Date/Time  Color YELLOW/STRAW 09/06/2018 07:11 PM  
 Appearance CLOUDY (A) 09/06/2018 07:11 PM  
 Specific gravity 1.015 09/06/2018 07:11 PM  
 pH (UA) 5.5 09/06/2018 07:11 PM  
 Protein NEGATIVE  09/06/2018 07:11 PM  
 Glucose NEGATIVE  09/06/2018 07:11 PM  
 Ketone NEGATIVE  09/06/2018 07:11 PM  
 Bilirubin NEGATIVE  09/06/2018 07:11 PM  
 Urobilinogen 0.2 09/06/2018 07:11 PM  
 Nitrites POSITIVE (A) 09/06/2018 07:11 PM  
 Leukocyte Esterase LARGE (A) 09/06/2018 07:11 PM  
 Epithelial cells FEW 09/06/2018 07:11 PM  
 Bacteria 1+ (A) 09/06/2018 07:11 PM  
 WBC  09/06/2018 07:11 PM  
 RBC 5-10 09/06/2018 07:11 PM  
 
Medications Reviewed:  
 
Current Facility-Administered Medications Medication Dose Route Frequency  acetaminophen (TYLENOL) tablet 650 mg  650 mg Oral Q6H PRN  
 collagenase (SANTYL) 250 unit/gram ointment   Topical DAILY  senna (SENOKOT) tablet 8.6 mg  1 Tab Oral QHS  polyethylene glycol (MIRALAX) packet 17 g  17 g Oral DAILY  magnesium hydroxide (MILK OF MAGNESIA) 400 mg/5 mL oral suspension 30 mL  30 mL Oral DAILY PRN  
 bisacodyl (DULCOLAX) tablet 5 mg  5 mg Oral DAILY PRN  
 bisacodyl (DULCOLAX) suppository 10 mg  10 mg Rectal DAILY PRN  
 docusate sodium (COLACE) capsule 100 mg  100 mg Oral BID  SITagliptin (JANUVIA) tablet 100 mg  100 mg Oral DAILY  haloperidol (HALDOL) tablet 1 mg  1 mg Oral TID  haloperidol lactate (HALDOL) injection 1 mg  1 mg IntraMUSCular Q8H PRN  
 haloperidol lactate (HALDOL) injection 2 mg  2 mg IntraMUSCular Q8H PRN  
 amiodarone (CORDARONE) tablet 200 mg  200 mg Oral EVERY OTHER DAY  cholecalciferol (VITAMIN D3) tablet 2,000 Units  2,000 Units Oral DAILY  gabapentin (NEURONTIN) capsule 800 mg  800 mg Oral PCL  gabapentin (NEURONTIN) capsule 1,200 mg  1,200 mg Oral QHS  LORazepam (ATIVAN) tablet 0.5 mg  0.5 mg Oral QHS PRN  polyvinyl alcohol (LIQUIFILM TEARS) 1.4 % ophthalmic solution 1 Drop  1 Drop Both Eyes PRN  propranolol (INDERAL) tablet 20 mg  20 mg Oral QHS  sodium chloride (NS) flush 5-10 mL  5-10 mL IntraVENous PRN  
 glucose chewable tablet 16 g  4 Tab Oral PRN  
 dextrose (D50W) injection syrg 12.5-25 g  25-50 mL IntraVENous PRN  
 glucagon (GLUCAGEN) injection 1 mg  1 mg IntraMUSCular PRN  
 insulin lispro (HUMALOG) injection   SubCUTAneous AC&HS  miconazole (SECURA) 2 % extra thick cream   Topical BID  ziprasidone (GEODON) 5 mg in sterile water (preservative free) 0.25 mL injection  5 mg IntraMUSCular Q12H PRN  
______________________________________________________________________ EXPECTED LENGTH OF STAY: 3d 22h ACTUAL LENGTH OF STAY:          [id number] Davies, NP

## 2018-09-23 NOTE — PROGRESS NOTES
Bedside and Verbal shift change report given to [address] [place] [address] (oncoming nurse) by Becca Cai (offgoing nurse). Report included the following information SBAR, Kardex, Intake/Output, MAR, Recent Results and Med Rec Status.

## 2018-09-24 NOTE — PROGRESS NOTES
Bedside shift change report given to Colonel Osborn RN (oncoming nurse) by Maria Guadalupe Perdomo (offgoing nurse). Report included the following information SBAR. Notified by PCT Theodora New Patient w/ low BP via automatic 87/50; this nurse rechecked manual 99/62, patient states she \"feels fine\"; sitting in bed eating crackers. Rechecked @ 1015 manual 110/65; patient sitting up drinking coffee; asks to speak with care manager; message relayed.

## 2018-09-24 NOTE — PROGRESS NOTES
Problem: Mobility Impaired (Adult and Pediatric) Goal: *Acute Goals and Plan of Care (Insert Text) Physical Therapy Goals Initiated 9/20/2018 1. Patient will move from supine to sit and sit to supine  and roll side to side in bed with supervision/set-up within 7 day(s). 2.  Patient will transfer from bed to chair and chair to bed with supervision/set-up using the least restrictive device within 7 day(s). 3.  Patient will perform sit to stand with supervision/set-up within 7 day(s). 4.  Patient will ambulate with supervision/set-up for 100 feet with the least restrictive device within 7 day(s). physical Therapy TREATMENT Patient: Elisabeth Caceres (15 y.o. female) Date: 9/24/2018 Diagnosis: UTI (urinary tract infection) Agitation Dementia with behavioral disturbance Cellulitis of left foot Cellulitis of multiple sites of buttock Leukocytosis UTI (urinary tract infection) Agitation Dementia with behavioral disturbance Cellulitis of left foot Cellulitis of multiple sites of buttock Leukocytosis UTI (urinary tract infection) Precautions: Fall Chart, physical therapy assessment, plan of care and goals were reviewed. ASSESSMENT: 
Patient received in chair requesting to return to bed  Having finished her lunch. Was agreeable with encouragement to walk before returning to bed. Using her rollator (brake is broken), ambulated in room back and forth with slow gait. More unsteady with posterior gait with tendency to lean backwards. Returned to bed requesting help to get LE's onto bed. Assisted her to get comfortable and left with all needs met. Overall cooperative and seemed to enjoy walking. Recommend staff to reinforce using rollator in room and walking to bathroom. Progression toward goals: 
[]    Improving appropriately and progressing toward goals [x]    Improving slowly and progressing toward goals 
[]    Not making progress toward goals and plan of care will be adjusted PLAN: 
Patient continues to benefit from skilled intervention to address the above impairments. Continue treatment per established plan of care. Discharge Recommendations:  Home Health Further Equipment Recommendations for Discharge:  rollator SUBJECTIVE:  
Patient stated I could do more if I could see. Radha Anderson OBJECTIVE DATA SUMMARY:  
Critical Behavior: 
Neurologic State: Alert, Confused Orientation Level: Oriented to person Cognition: Memory loss, Impaired decision making, Poor safety awareness Safety/Judgement: Awareness of environment, Decreased awareness of need for assistance, Decreased awareness of need for safety, Decreased insight into deficits, Fall prevention Functional Mobility Training: 
Bed Mobility: 
  
  
Sit to Supine: Moderate assistance Transfers: 
Sit to Stand: Minimum assistance Stand to Sit: Minimum assistance Balance: 
Sitting: Impaired; Without support Sitting - Static: Good (unsupported) Sitting - Dynamic: Fair (occasional) Standing: Impaired; With support Standing - Static: Fair Standing - Dynamic : Fair Ambulation/Gait Training: 
Distance (ft): 50 Feet (ft) Assistive Device: Gait belt;Walker, rollator Ambulation - Level of Assistance: Contact guard assistance Gait Abnormalities: Decreased step clearance Base of Support: Narrowed Speed/Noemy: Slow Step Length: Right shortened;Left shortened After treatment:  
[]    Patient left in no apparent distress sitting up in chair 
[x]    Patient left in no apparent distress in bed 
[x]    Call bell left within reach [x]    Nursing notified 
[]    Caregiver present [x]    Bed alarm activated COMMUNICATION/COLLABORATION:  
The patients plan of care was discussed with: Registered Nurse Annabella Fry, PT Time Calculation: 18 mins

## 2018-09-24 NOTE — PROGRESS NOTES
Problem: Self Care Deficits Care Plan (Adult) Goal: *Acute Goals and Plan of Care (Insert Text) Occupational Therapy Goals Initiated 2018 1. Patient will perform grooming sitting on EOB with supervision/set-up within 7 day(s). 2.  Patient will perform upper body ADLs sitting on EOB with supervision/set-up within 7 day(s). 3.  Patient will perform lower body ADLs sitting on EOB with minimal assistance within 7 day(s). 4.  Patient will perform toilet transfers with minimal assistance/contact guard assist within 7 day(s). 5.  Patient will perform all aspects of toileting with minimal assistance/contact guard assist within 7 day(s). 6.  Patient will participate in upper extremity therapeutic exercise/activities with supervision/set-up for 5 minutes within 7 day(s). 7.  Patient will utilize energy conservation techniques during functional activities with verbal cues within 7 day(s). Occupational Therapy TREATMENT Patient: Sue Winston (83 y.o. female) Date: 2018 Diagnosis: UTI (urinary tract infection) Agitation Dementia with behavioral disturbance Cellulitis of left foot Cellulitis of multiple sites of buttock Leukocytosis UTI (urinary tract infection) Agitation Dementia with behavioral disturbance Cellulitis of left foot Cellulitis of multiple sites of buttock Leukocytosis UTI (urinary tract infection) Precautions: Fall Chart, occupational therapy assessment, plan of care, and goals were reviewed. ASSESSMENT: 
Pt is making slow progress towards goals. She required max encouragement for active  of ADLs pt requested to be performed. She required max A for standing balance and total A for toileting tasks. Recommend SNF at discharge. Progression toward goals: 
[]       Improving appropriately and progressing toward goals [x]       Improving slowly and progressing toward goals 
[]       Not making progress toward goals and plan of care will be adjusted PLAN: 
Patient continues to benefit from skilled intervention to address the above impairments. Continue treatment per established plan of care. Discharge Recommendations:  Macho Torres Further Equipment Recommendations for Discharge:  TBD SUBJECTIVE:  
Patient stated Langabigail Vilao my underwear up and hang it to dry!  OBJECTIVE DATA SUMMARY:  
Cognitive/Behavioral Status: 
Neurologic State: Drowsy; Eyes open to voice Orientation Level: Oriented to person Cognition: Decreased attention/concentration; Impaired decision making;Memory loss;Poor safety awareness Perception: Cues to maintain midline in standing;Verbal;Tactile;Visual 
Perseveration: No perseveration noted Safety/Judgement: Decreased awareness of need for assistance;Decreased awareness of need for safety;Decreased insight into deficits; Fall prevention Functional Mobility and Transfers for ADLs: 
Bed Mobility: 
Sit to Supine: Moderate assistance; Additional time Scooting: Maximum assistance Transfers: 
Sit to Stand: Maximum assistance; Additional time;Assist x1 (eyes closed and minimal active participation) Balance: 
Sitting: Impaired; Without support Sitting - Static: Fair (occasional) Sitting - Dynamic: Fair (occasional) Standing: Impaired; With support (rollator) Standing - Static: Fair Standing - Dynamic : Fair ADL Intervention: Toileting Toileting Assistance: Total assistance(dependent) (pt incontinent of urine) Bladder Hygiene: Total assistance (dependent) (in standing) Clothing Management: Total assistance (dependent) Cues: Physical assistance for pants up;Physical assistance for pants down; Tactile cues provided;Verbal cues provided;Visual cues provided Adaptive Equipment: Chao Sharp Cognitive Retraining Safety/Judgement: Decreased awareness of need for assistance;Decreased awareness of need for safety;Decreased insight into deficits; Fall prevention Pain: Pt with no c/o pain during session Activity Tolerance:  
Fair-poor Please refer to the flowsheet for vital signs taken during this treatment. After treatment:  
[] Patient left in no apparent distress sitting up in chair 
[x] Patient left in no apparent distress in bed 
[x] Call bell left within reach [x] Nursing notified and present 
[] Caregiver present [x] Bed alarm activated COMMUNICATION/COLLABORATION:  
The patients plan of care was discussed with: Registered Nurse Isi Mcknight OT Time Calculation: 35 mins

## 2018-09-24 NOTE — PROGRESS NOTES
Problem: Falls - Risk of 
Goal: *Absence of Falls Document Tia Manus Fall Risk and appropriate interventions in the flowsheet. Outcome: Progressing Towards Goal 
Fall Risk Interventions: 
Mobility Interventions: Bed/chair exit alarm, Communicate number of staff needed for ambulation/transfer, OT consult for ADLs, Patient to call before getting OOB Mentation Interventions: Bed/chair exit alarm, Door open when patient unattended, Eyeglasses and hearing aids, Gait belt with transfers/ambulation, More frequent rounding, Reorient patient, Room close to nurse's station, Toileting rounds Medication Interventions: Bed/chair exit alarm, Evaluate medications/consider consulting pharmacy, Patient to call before getting OOB, Teach patient to arise slowly Elimination Interventions: Bed/chair exit alarm, Call light in reach, Elevated toilet seat, Patient to call for help with toileting needs, Toilet paper/wipes in reach, Toileting schedule/hourly rounds History of Falls Interventions: Bed/chair exit alarm, Consult care management for discharge planning, Door open when patient unattended, Evaluate medications/consider consulting pharmacy, Room close to nurse's station Problem: Pressure Injury - Risk of 
Goal: *Prevention of pressure injury Document Nathanael Scale and appropriate interventions in the flowsheet. Outcome: Progressing Towards Goal 
Pressure Injury Interventions: 
Sensory Interventions: Check visual cues for pain, Float heels Moisture Interventions: Check for incontinence Q2 hours and as needed, Contain wound drainage, Limit adult briefs Activity Interventions: Chair cushion, Increase time out of bed, Pressure redistribution bed/mattress(bed type), PT/OT evaluation Mobility Interventions: Chair cushion, Float heels, HOB 30 degrees or less, Pressure redistribution bed/mattress (bed type), PT/OT evaluation Nutrition Interventions: Document food/fluid/supplement intake, Offer support with meals,snacks and hydration Friction and Shear Interventions: Foam dressings/transparent film/skin sealants, HOB 30 degrees or less

## 2018-09-24 NOTE — PROGRESS NOTES
[facility] spoke with the patient's grand-daughter (Jonathon Atkinson [phone]). The movers will meet with Ashwini tomorrow morning at 8:30am at patient's former residence. Naseem faxed documents to the  and he will be at the facility tomorrow morning to present signed documents and make payment to the facility. [phone] is on board for home health services to be initiated once patient arrives to the [facility]. [facility] will arrange ambulance transport for 2pm tomorrow. Packet left on hard chart. INGRID Cheng, JESUS 
 
4:08p  Ambulance transport confirmed for 2pm tomorrow with [facility].   INGRID Cheng, CRM

## 2018-09-24 NOTE — PROGRESS NOTES
Hospitalist Progress Note Eyal Ch NP Answering service: [phone] OR [phone] from in house phone Date of Service:  2018 NAME:  Kandace Magaña :  10/17/1932 MRN:  [id number] Admission Summary: Pt presented to the ED with a swollen, red and painful foot. She had been extremely anxious, agitated, combative and uncooperative upon arrival to the ED. According to reports, she had been sent to her PCP office for combative behavior. Pmhx: dementia, Parkinson disease, type 2 diabetes mellitus, chronic pain, peripheral neuropathy Interval history / Subjective:  
Pt in no acute distress. Behavior has improved over the last week. Plan to discharge tomorrow to [facility] with [facility] in place. [facility] aware Assessment & Plan: Hx Type 2 diabetes mellitus with hyperglycemia, uncontrolled:  
- HgbA1c 6.3 
- accuchecks ordered with SSI. Blood glucose 140-168 
- on carbohydrate controlled diet - 9/15 Januvia increased to 100 mg 
- eating fairly well Metabolic Encephalopathy with agressive behavior and agitation and a hx of dementia:   
-  Metabolic issues have been addressed, now primary issues are dementia with behavioral disturbance 
- could have been exacerbated by UTI/cellulitis 
- was initially sent to ED for TDO eval. Psychiatry has seen and ordered meds - no plans to transfer to inpatient psych - Last doses of geodon on . 
- dispo problem due to behavioral issues. Psychiatry saw  and indicates improvement. - On scheduled Haldol and has been cooperative and pleasant Urinary tract infection:  Completed abx on  
- suspicious for UTI on admission 
- ucx with > 100 K of mixed urogenital dante 
- yeast on UA, none on ucx 
- rcvd rocephin x 2 doses, completed therapy on Keflex for this and cellulitis Cellulitis/nonhealing L foot wound: - Left foot 3-view x-ray showed nonspecific soft tissue swelling with no fx or OM noted - Podiatry consulted - not a surgical issue, continue with wound care. - had 1 dose of IV vancomycin and lost IV 
- treated with kefllex - pain: tylenol prn Leukocytosis: Resolved Rash with superimposed cellulitis: Treated Hx Parkinson disease: nortriptyline, galantamine, propranalol at home Constipation:  
- on colace BID and daily senna 
- may have dulcolax po and/or suppository 
- had multiple BMs 9/21 - not sure pt recalls this Code status: Full DVT prophylaxis: SCDs Care Plan discussed with: nursing, patient Disposition: [facility] Problems  Date Reviewed: 9/6/2018 Codes Class Noted POA Leukocytosis ICD-10-CM: B03.457 ICD-9-CM: 288.60  9/6/2018 Unknown Agitation ICD-10-CM: R45.1 ICD-9-CM: 307.9  9/6/2018 Unknown * (Principal)UTI (urinary tract infection) ICD-10-CM: N39.0 ICD-9-CM: 599.0  9/6/2018 Unknown Cellulitis of left foot ICD-10-CM: L03.116 ICD-9-CM: 682.7  9/6/2018 Unknown Cellulitis of multiple sites of buttock ICD-10-CM: L03.317 ICD-9-CM: 682.5  9/6/2018 Unknown Dementia with behavioral disturbance ICD-10-CM: F03.91 
ICD-9-CM: 294.21  9/6/2018 Unknown Review of Systems:  
Denies HA. No chest pain or pressure. No SOB. No abdominal complaints Vital Signs:  
 Last 24hrs VS reviewed since prior progress note. Most recent are: 
Visit Vitals  /65 (BP 1 Location: Right arm)  Pulse 60  Temp 98 °F (36.7 °C)  Resp 16  
 Ht 5' 2\" (1.575 m)  Wt 50 kg (110 lb 3.7 oz)  SpO2 97%  Breastfeeding No  
 BMI 20.16 kg/m2 Intake/Output Summary (Last 24 hours) at 09/24/18 1420 Last data filed at 09/24/18 1339 Gross per 24 hour Intake              480 ml Output              200 ml Net              280 ml Physical Examination: Constitutional:  Elderly female in no distress, very [place] and has vision deficit. ENT:  Oral mucous membranes moist   
Resp:  No accessory muscle use and on RA  
GI: BM 9/21 Neurologic:  AA&Ox2, moves all extremities Extremities: Scattered bruises, skin tears. 1+ pitting edema in BLE (feet) Skin: Scattered bruises and scabbed areas/skin tears to arms and legs. Psych: Calm and cooperative Data Review:  
Review and/or order of clinical lab test 
Review and/or order of tests in the radiology section of CPT Review and/or order of tests in the medicine section of CPT Labs:  
 
No results for input(s): WBC, HGB, HCT, PLT, HGBEXT, HCTEXT, PLTEXT, HGBEXT, HCTEXT, PLTEXT in the last 72 hours. No results for input(s): NA, K, CL, CO2, BUN, CREA, GLU, CA, MG, PHOS, URICA in the last 72 hours. No results for input(s): SGOT, GPT, ALT, AP, TBIL, TBILI, TP, ALB, GLOB, GGT, AML, LPSE in the last 72 hours. No lab exists for component: AMYP, HLPSE No results for input(s): INR, PTP, APTT in the last 72 hours. No lab exists for component: INREXT, INREXT No results for input(s): FE, TIBC, PSAT, FERR in the last 72 hours. Lab Results Component Value Date/Time Folate 14.9 03/04/2015 02:49 PM  
  
No results for input(s): PH, PCO2, PO2 in the last 72 hours. No results for input(s): CPK, CKNDX, TROIQ in the last 72 hours. No lab exists for component: CPKMB No results found for: CHOL, CHOLX, CHLST, CHOLV, HDL, LDL, LDLC, DLDLP, TGLX, TRIGL, TRIGP, CHHD, CHHDX Lab Results Component Value Date/Time Glucose (POC) 170 (H) 09/24/2018 11:38 AM  
 Glucose (POC) 135 (H) 09/24/2018 06:51 AM  
 Glucose (POC) 177 (H) 09/23/2018 09:48 PM  
 Glucose (POC) 183 (H) 09/23/2018 03:59 PM  
 Glucose (POC) 159 (H) 09/23/2018 11:24 AM  
 
Lab Results Component Value Date/Time  Color YELLOW/STRAW 09/06/2018 07:11 PM  
 Appearance CLOUDY (A) 09/06/2018 07:11 PM  
 Specific gravity 1.015 09/06/2018 07:11 PM  
 pH (UA) 5.5 09/06/2018 07:11 PM  
 Protein NEGATIVE  09/06/2018 07:11 PM  
 Glucose NEGATIVE  09/06/2018 07:11 PM  
 Ketone NEGATIVE  09/06/2018 07:11 PM  
 Bilirubin NEGATIVE  09/06/2018 07:11 PM  
 Urobilinogen 0.2 09/06/2018 07:11 PM  
 Nitrites POSITIVE (A) 09/06/2018 07:11 PM  
 Leukocyte Esterase LARGE (A) 09/06/2018 07:11 PM  
 Epithelial cells FEW 09/06/2018 07:11 PM  
 Bacteria 1+ (A) 09/06/2018 07:11 PM  
 WBC  09/06/2018 07:11 PM  
 RBC 5-10 09/06/2018 07:11 PM  
 
Medications Reviewed:  
 
Current Facility-Administered Medications Medication Dose Route Frequency  influenza vaccine 2018-19 (6 mos+)(PF) (FLUARIX QUAD/FLULAVAL QUAD) injection 0.5 mL  0.5 mL IntraMUSCular PRIOR TO DISCHARGE  acetaminophen (TYLENOL) tablet 650 mg  650 mg Oral Q6H PRN  
 collagenase (SANTYL) 250 unit/gram ointment   Topical DAILY  senna (SENOKOT) tablet 8.6 mg  1 Tab Oral QHS  polyethylene glycol (MIRALAX) packet 17 g  17 g Oral DAILY  magnesium hydroxide (MILK OF MAGNESIA) 400 mg/5 mL oral suspension 30 mL  30 mL Oral DAILY PRN  
 bisacodyl (DULCOLAX) tablet 5 mg  5 mg Oral DAILY PRN  
 bisacodyl (DULCOLAX) suppository 10 mg  10 mg Rectal DAILY PRN  
 docusate sodium (COLACE) capsule 100 mg  100 mg Oral BID  SITagliptin (JANUVIA) tablet 100 mg  100 mg Oral DAILY  haloperidol (HALDOL) tablet 1 mg  1 mg Oral TID  haloperidol lactate (HALDOL) injection 1 mg  1 mg IntraMUSCular Q8H PRN  
 haloperidol lactate (HALDOL) injection 2 mg  2 mg IntraMUSCular Q8H PRN  
 amiodarone (CORDARONE) tablet 200 mg  200 mg Oral EVERY OTHER DAY  cholecalciferol (VITAMIN D3) tablet 2,000 Units  2,000 Units Oral DAILY  gabapentin (NEURONTIN) capsule 800 mg  800 mg Oral PCL  gabapentin (NEURONTIN) capsule 1,200 mg  1,200 mg Oral QHS  LORazepam (ATIVAN) tablet 0.5 mg  0.5 mg Oral QHS PRN  polyvinyl alcohol (LIQUIFILM TEARS) 1.4 % ophthalmic solution 1 Drop  1 Drop Both Eyes PRN  
  propranolol (INDERAL) tablet 20 mg  20 mg Oral QHS  sodium chloride (NS) flush 5-10 mL  5-10 mL IntraVENous PRN  
 glucose chewable tablet 16 g  4 Tab Oral PRN  
 dextrose (D50W) injection syrg 12.5-25 g  25-50 mL IntraVENous PRN  
 glucagon (GLUCAGEN) injection 1 mg  1 mg IntraMUSCular PRN  
 insulin lispro (HUMALOG) injection   SubCUTAneous AC&HS  miconazole (SECURA) 2 % extra thick cream   Topical BID  ziprasidone (GEODON) 5 mg in sterile water (preservative free) 0.25 mL injection  5 mg IntraMUSCular Q12H PRN  
______________________________________________________________________ EXPECTED LENGTH OF STAY: 3d 22h ACTUAL LENGTH OF STAY:          18 
           
Josefina Carias V NP

## 2018-09-24 NOTE — PROGRESS NOTES
Occupational Therapy Note:  Chart reviewed and pt cleared for therapy by RN. Pt in bed with eyes closed upon arrival.  Re-educated pt on role of OT and benefits of activity. Pt declined OT stating she was too tired and needed to rest.  Encouraged pt's participation and she still declined. Will follow up at later time as able.  
Floyd Mcnulty, OTR/L, CBIS

## 2018-09-25 PROBLEM — L03.116 CELLULITIS OF LEFT FOOT: Status: RESOLVED | Noted: 2018-01-01 | Resolved: 2018-01-01

## 2018-09-25 PROBLEM — N39.0 UTI (URINARY TRACT INFECTION): Status: RESOLVED | Noted: 2018-01-01 | Resolved: 2018-01-01

## 2018-09-25 PROBLEM — D72.829 LEUKOCYTOSIS: Status: RESOLVED | Noted: 2018-01-01 | Resolved: 2018-01-01

## 2018-09-25 PROBLEM — R45.1 AGITATION: Status: RESOLVED | Noted: 2018-01-01 | Resolved: 2018-01-01

## 2018-09-25 PROBLEM — F03.918 DEMENTIA WITH BEHAVIORAL DISTURBANCE: Status: RESOLVED | Noted: 2018-01-01 | Resolved: 2018-01-01

## 2018-09-25 PROBLEM — L03.317 CELLULITIS OF MULTIPLE SITES OF BUTTOCK: Status: RESOLVED | Noted: 2018-01-01 | Resolved: 2018-01-01

## 2018-09-25 NOTE — PROGRESS NOTES
Problem: Falls - Risk of 
Goal: *Absence of Falls Document Samy Smith Fall Risk and appropriate interventions in the flowsheet. Outcome: Progressing Towards Goal 
Fall Risk Interventions: 
Mobility Interventions: Bed/chair exit alarm Mentation Interventions: Adequate sleep, hydration, pain control Medication Interventions: Bed/chair exit alarm Elimination Interventions: Bed/chair exit alarm, Call light in reach, Patient to call for help with toileting needs History of Falls Interventions: Bed/chair exit alarm, Door open when patient unattended

## 2018-09-25 NOTE — DISCHARGE SUMMARY
Discharge Summary PATIENT ID: Olu Olivarez MRN: [id number] YOB: 1932 DATE OF ADMISSION: 9/6/2018  2:09 PM   
DATE OF DISCHARGE: 9/25/2018 PRIMARY CARE PROVIDER: Esther Randolph DO  
 
ATTENDING PHYSICIAN: Dr. Hema Beverly DISCHARGING PROVIDER: Renetta Mckeon NP To contact this individual call [phone] and ask the  to page. If unavailable ask to be transferred the Adult Hospitalist Department. CONSULTATIONS: ED CONSULT TO SENIOR SERVICES CASE MANAGEMENT 
ED CONSULT TO SENIOR SERVICES NURSE PRACTITIONER 
IP CONSULT TO PSYCHIATRY IP CONSULT TO PODIATRY PROCEDURES/SURGERIES: * No surgery found * [address] [facility] COURSE:  
Pt presented to the ED with a swollen, red and painful foot.  She had been extremely anxious, agitated, combative and uncooperative upon arrival to the ED.  According to reports, she had been sent to her PCP office for combative behavior. Pmhx: dementia, Parkinson disease, type 2 diabetes mellitus, chronic pain, peripheral neuropathy 9/25/2018 Pt completed abx for cellulitis and uti Pt had an extended hospital stay due to behavioral disturbances from vascular dementia. Medications have been adjusted by psychiatry and pt on discharge is calm and cooperative. She is discharged to [address] [place] [address] [facility]. DISCHARGE DIAGNOSES / PLAN:   
 
Hx Type 2 diabetes mellitus with hyperglycemia, uncontrolled:  
- HgbA1c 6.3 
- accuchecks ordered with SSI. Blood glucose 140-168 
- on carbohydrate controlled diet - 9/15 Januvia increased to 100 mg 
- eating fairly well 
  
Metabolic Encephalopathy with agressive behavior and agitation and a hx of dementia:   
- 5/49 Metabolic issues have been addressed, now primary issues are dementia with behavioral disturbance 
- could have been exacerbated by UTI/cellulitis 
- was initially sent to ED for TDO eval. Psychiatry has seen and ordered meds - no plans to transfer to inpatient psych - Last doses of geodon on 9/13. 
- dispo problem due to behavioral issues. Psychiatry saw 9/16 and indicates improvement. - On scheduled Haldol and has been cooperative and pleasant 
  
Urinary tract infection:  Completed abx on 9/12 
- suspicious for UTI on admission 
- ucx with > 100 K of mixed urogenital dante 
- yeast on UA, none on ucx 
- rcvd rocephin x 2 doses, completed therapy on Keflex for this and cellulitis 
  
Cellulitis/nonhealing L foot wound: - Left foot 3-view x-ray showed nonspecific soft tissue swelling with no fx or OM noted - Podiatry consulted - not a surgical issue, continue with wound care. - had 1 dose of IV vancomycin and lost IV 
- treated with kefllex - pain: tylenol prn 
  
Leukocytosis: Resolved  
  
Rash with superimposed cellulitis: Treated 
  
Hx Parkinson disease: nortriptyline, galantamine, propranalol at home 
  
Constipation:  
- on colace BID and daily senna 
- may have dulcolax po and/or suppository 
- had multiple BMs 9/21 - not sure pt recalls this 
  
Code status: Full PENDING TEST RESULTS:  
At the time of discharge the following test results are still pending: none FOLLOW UP APPOINTMENTS:   
Follow-up Information Follow up With Details Comments Contact Drew Webber, DO  follow up post hospitalization [address] [place] [id number] [phone] ADDITIONAL CARE RECOMMENDATIONS: 
You have completed antibiotics for bladder infection and cellulitis Home health nurses will come visit with you and do wound care Check blood sugars twice a day New medication: Haldol (control mood/behavior), Januvia (for diabetes), Senna-docusate is a combination pill which has a laxative and stool softner in one pill. Noretriptyline was discontinued by psychiatry. Galantamine and aricept were also discontinued. DIET: diabetic ACTIVITY: Activity as tolerated WOUND CARE: Apply Santyl ointment to left dorsal foot and right lateral lower leg wounds then cover with Aquacel AG and secure with Mepilex Border dressings; cleanse with saline. EQUIPMENT needed: none DISCHARGE MEDICATIONS: 
Discharge Medication List as of 9/25/2018 10:41 AM  
  
START taking these medications Details  
collagenase (SANTYL) 250 unit/gram ointment Apply 1 Each to affected area daily. , Print, Disp-30 g, R-0  
  
haloperidol (HALDOL) 1 mg tablet Take 1 Tab by mouth three (3) times daily. , Print, Disp-90 Tab, R-0  
  
SITagliptin (JANUVIA) 100 mg tablet Take 1 Tab by mouth daily. , Print, Disp-30 Tab, R-0  
  
senna-docusate (PERICOLACE) 8.6-50 mg per tablet Take 1 Tab by mouth daily. , Print, Disp-30 Tab, R-0  
  
  
CONTINUE these medications which have NOT CHANGED Details  
!! gabapentin (NEURONTIN) 400 mg capsule Take 1,200 mg by mouth nightly., Historical Med LORazepam (ATIVAN) 0.5 mg tablet Take 0.5 mg by mouth nightly as needed., Historical Med  
  
!! gabapentin (NEURONTIN) 400 mg capsule Take 800 mg by mouth daily (after lunch). , Historical Med  
  
propranolol (INDERAL) 20 mg tablet Take 20 mg by mouth nightly., Historical Med  
  
cholecalciferol, vitamin D3, (VITAMIN D3) 2,000 unit tab Take 2,000 Units by mouth daily. , Historical Med  
  
Calcium-Cholecalciferol, D3, 600 mg(1,500mg) -400 unit cap Take 1 Cap by mouth daily. , Historical Med  
  
peg 400-propylene glycol (SYSTANE, PROPYLENE GLYCOL,) 0.4-0.3 % drop Apply 1 Drop to eye as needed., Historical Med  
  
bisacodyl (DULCOLAX, BISACODYL,) 5 mg EC tablet Take 5 mg by mouth daily as needed for Constipation. , Historical Med  
  
albuterol (ACCUNEB) 1.25 mg/3 mL nebu 3 mL by Nebulization route every six (6) hours as needed.  Wheezing or shortness of breath, No Print, Disp-1 Each, R-0  
  
linaclotide (LINZESS) 290 mcg cap capsule Take 290 mcg by mouth daily as needed., Historical Med  
  
 amiodarone (CORDARONE) 200 mg tablet Take 200 mg by mouth every other day., Historical Med  
  
VIT A/VIT C/VIT E/ZINC/COPPER (OCUVITE PRESERVISION PO) Take 1 Cap by mouth two (2) times a day., Historical Med  
  
omeprazole (PRILOSEC) 20 mg capsule Take 20 mg by mouth daily as needed., Historical Med  
  
 !! - Potential duplicate medications found. Please discuss with provider. STOP taking these medications  
  
 galantamine (RAZADYNE) 8 mg tablet Comments:  
Reason for Stopping:   
   
 donepezil (ARICEPT) 5 mg tablet Comments:  
Reason for Stopping:   
   
 nortriptyline (PAMELOR) 10 mg capsule Comments:  
Reason for Stopping:   
   
  
 
 
 
NOTIFY YOUR PHYSICIAN FOR ANY OF THE FOLLOWING:  
Fever over 101 degrees for 24 hours. Chest pain, shortness of breath, fever, chills, nausea, vomiting, diarrhea, change in mentation, falling, weakness, bleeding. Severe pain or pain not relieved by medications. Or, any other signs or symptoms that you may have questions about. DISPOSITION: 
x  Home With: 
 OT  PT xx HH  RN  
  
 Long term SNF/Inpatient Rehab Independent/assisted living Hospice Other:  
 
 
PATIENT CONDITION AT DISCHARGE:  
 
Functional status Poor Deconditioned   
x Independent Cognition  
 x Lucid   
x Forgetful   
x Dementia Catheters/lines (plus indication) Mcclellan PICC   
 PEG   
x None Code status  
x  Full code DNR   
 
PHYSICAL EXAMINATION AT DISCHARGE: 
Constitutional:  Elderly female in no distress, very [place] and has vision deficit. ENT:  Oral mucous membranes moist   
Resp:  No accessory muscle use and on RA  
GI: BM 9/21 Neurologic:  AA&Ox2, moves all extremities Extremities: Scattered bruises, skin tears. 1+ pitting edema in BLE (feet) Skin: Scattered bruises and scabbed areas/skin tears to arms and legs. Psych: Calm and cooperative CHRONIC MEDICAL DIAGNOSES: 
Problem List as of 9/25/2018  Date Reviewed: 9/25/2018 Codes Class Noted - Resolved SOB (shortness of breath) ICD-10-CM: R06.02 
ICD-9-CM: 786.05  6/22/2018 - Present Hypoxia ICD-10-CM: R09.02 
ICD-9-CM: 799.02  6/22/2018 - Present Dementia ICD-10-CM: F03.90 ICD-9-CM: 294.20  6/22/2018 - Present Essential tremor ICD-10-CM: G25.0 ICD-9-CM: 333.1  6/22/2018 - Present Type 2 diabetes mellitus, without long-term current use of insulin (HCC) ICD-10-CM: E11.9 ICD-9-CM: 250.00  6/22/2018 - Present Mononeuritis of unspecified site ICD-10-CM: G58.9 ICD-9-CM: 355.9  6/19/2015 - Present Idiopathic progressive polyneuropathy ICD-10-CM: G60.3 ICD-9-CM: 356.4  6/19/2015 - Present Gait abnormality ICD-10-CM: R26.9 ICD-9-CM: 781.2  3/16/2015 - Present Cervicalgia ICD-10-CM: M54.2 ICD-9-CM: 723.1  3/5/2015 - Present Thoracic or lumbosacral neuritis or radiculitis, unspecified ICD-10-CM: [id number] ICD-9-CM: 724.4  3/4/2015 - Present Arthritis ICD-10-CM: M19.90 ICD-9-CM: 716.90  3/4/2015 - Present Dizziness ICD-10-CM: A05 ICD-9-CM: 780.4  3/4/2015 - Present Neuropathy ICD-10-CM: G62.9 ICD-9-CM: 355.9  3/4/2015 - Present Unspecified endocrine disorder ICD-10-CM: E34.9 ICD-9-CM: 259.9  3/4/2015 - Present Mild cognitive impairment without memory loss ICD-10-CM: G31.84 ICD-9-CM: 331.83  7/2/2014 - Present Major depressive disorder, single episode, unspecified ICD-10-CM: F32.9 ICD-9-CM: 296.20  7/2/2014 - Present RESOLVED: Leukocytosis ICD-10-CM: A46.111 ICD-9-CM: 288.60  9/6/2018 - 9/25/2018 RESOLVED: Agitation ICD-10-CM: R45.1 ICD-9-CM: 307.9  9/6/2018 - 9/25/2018 * (Principal)RESOLVED: UTI (urinary tract infection) ICD-10-CM: N39.0 ICD-9-CM: 599.0  9/6/2018 - 9/25/2018 RESOLVED: Cellulitis of left foot ICD-10-CM: L03.116 ICD-9-CM: 682.7  9/6/2018 - 9/25/2018  RESOLVED: Cellulitis of multiple sites of buttock ICD-10-CM: L03.317 
 ICD-9-CM: 682.5  9/6/2018 - 9/25/2018 RESOLVED: Dementia with behavioral disturbance ICD-10-CM: F03.91 
ICD-9-CM: 294.21  9/6/2018 - 9/25/2018 Greater than 30 minutes were spent with the patient on counseling and coordination of care Signed:  
Shara Gutierrez NP 
9/25/2018 
4:27 PM

## 2018-09-25 NOTE — PROGRESS NOTES
Report called to Marichuy Cazares [phone] using SBAR; noted in patients discharge packet were discharge paperwork faxed by Tsering Welch CM that had been electronically signed, as requested by Cristiana Gupta.

## 2018-09-25 NOTE — PROGRESS NOTES
Cm received call from [address] [place] [address],[facility] with [facility]. Agency will initiate services with patient tomorrow. CM faxed the Discharge Summary to the facility. The assignend nurse to call report to [phone]. Transport scheduled for 2pm today with [employer] [facility], [employer]

## 2018-09-25 NOTE — PROGRESS NOTES
Bedside shift change report given to [facility], RN (oncoming nurse) by Nova Good RN (offgoing nurse). Report included the following information SBAR, Kardex, Intake/Output, MAR and Recent Results.

## 2018-09-25 NOTE — DISCHARGE INSTRUCTIONS
Discharge Instructions       PATIENT ID: Rosy Boudreaux  MRN: [id number]   YOB: 1932    DATE OF ADMISSION: 9/6/2018  2:09 PM    DATE OF DISCHARGE: 9/25/2018    PRIMARY CARE PROVIDER: Thao Wright DO     ATTENDING PHYSICIAN: Alex Michael MD  DISCHARGING PROVIDER: Toby Kwok NP    To contact this individual call [phone] and ask the  to page. If unavailable ask to be transferred the Adult Hospitalist Department. DISCHARGE DIAGNOSES: UTI, cellulitis, dementia    CONSULTATIONS: ED CONSULT TO SENIOR SERVICES CASE MANAGEMENT  ED CONSULT TO SENIOR SERVICES NURSE PRACTITIONER  IP CONSULT TO PSYCHIATRY  IP CONSULT TO PODIATRY    PROCEDURES/SURGERIES: * No surgery found *    PENDING TEST RESULTS:   At the time of discharge the following test results are still pending: none    FOLLOW UP APPOINTMENTS:   Follow-up Information     Follow up With Details Comments Contact Info    Primitivo Cormier DO  follow up post hospitalization [address] [address]  [phone]             ADDITIONAL CARE RECOMMENDATIONS:  You have completed antibiotics for bladder infection and cellulitis  Home health nurses will come visit with you and do wound care  Check blood sugars twice a day  New medication: Haldol (control mood/behavior), Januvia (for diabetes), Senna-docusate is a combination pill which has a laxative and stool softner in one pill. Noretriptyline was discontinued by psychiatry. Galantamine and aricept were also discontinued. DIET: diabetic     ACTIVITY: Activity as tolerated    WOUND CARE: Apply Santyl ointment to left dorsal foot and right lateral lower leg wounds then cover with Aquacel AG and secure with Mepilex Border dressings; cleanse with saline. EQUIPMENT needed: none      DISCHARGE MEDICATIONS:   See Medication Reconciliation Form    · It is important that you take the medication exactly as they are prescribed.    · Keep your medication in the bottles provided by the pharmacist and keep a list of the medication names, dosages, and times to be taken in your wallet. · Do not take other medications without consulting your doctor. NOTIFY YOUR PHYSICIAN FOR ANY OF THE FOLLOWING:   Fever over 101 degrees for 24 hours. Chest pain, shortness of breath, fever, chills, nausea, vomiting, diarrhea, change in mentation, falling, weakness, bleeding. Severe pain or pain not relieved by medications. Or, any other signs or symptoms that you may have questions about. DISPOSITION:  X  Home With:   OT  PT XX [facility]  RN       SNF/Inpatient Rehab/LTAC    Independent/assisted living    Hospice    Other:     CDMP Checked: Yes X     PROBLEM LIST Updated: Yes X       Signed:   Amber DE GUZMAN NP  9/25/2018  8:46 AM     Sitagliptin (Januvia) - (By mouth)   Why this medicine is used:   Treats type 2 diabetes. Contact a nurse or doctor right away if you have:  · Fast or pounding heartbeat  · Blistering, peeling, red skin rash  · Trouble breathing, cold sweat, bluish-colored skin, swelling  · Swelling in your hands, ankles, or feet  · Shaking, trembling, sweating, faintness or lightheadedness, confusion  · Sudden and severe stomach pain, hunger, nausea, vomiting, fever     Common side effects:  · Cough, runny or stuffy nose, sore throat  · Headache  © 2017 [address] Information is for End User's use only and may not be sold, redistributed or otherwise used for commercial purposes. I have reviewed discharge instructions with the patient. The patient verbalized understanding. [employer] Activation    Thank you for requesting access to [employer]. Please follow the instructions below to securely access and download your online medical record. [employer] allows you to send messages to your doctor, view your test results, renew your prescriptions, schedule appointments, and more. How Do I Sign Up? 1.  In your internet browser, go to [email or web address]  2. Click on the First Time User? Click Here link in the Sign In box. You will be redirect to the New Member Sign Up page. 3. Enter your [employer] Access Code exactly as it appears below. You will not need to use this code after youve completed the sign-up process. If you do not sign up before the expiration date, you must request a new code. MyChart Access Code: Activation code not generated  Current [employer] Status: Active (This is the date your brick[employer]mobile[employer] access code will )    4. Enter the last four digits of your Social Security Number (xxxx) and Date of Birth (mm/dd/yyyy) as indicated and click Submit. You will be taken to the next sign-up page. 5. Create a brick[employer]mobilet ID. This will be your [employer] login ID and cannot be changed, so think of one that is secure and easy to remember. 6. Create a [employer] password. You can change your password at any time. 7. Enter your Password Reset Question and Answer. This can be used at a later time if you forget your password. 8. Enter your e-mail address. You will receive e-mail notification when new information is available in [address] 9. Click Sign Up. You can now view and download portions of your medical record. 10. Click the Download Summary menu link to download a portable copy of your medical information. Additional Information    If you have questions, please visit the Frequently Asked Questions section of the [employer] website at [email or web address] Remember, [employer] is NOT to be used for urgent needs. For medical emergencies, dial 911.

## 2019-03-21 NOTE — PROGRESS NOTES
Pt notified    Pt seems to be less agitated and not volatile/explosive. Tolerating medication ok and not needing prn medication. Help from gerontology nursing appreciated and good guidelines. Pt with diagnosis of vascular dementia with behavioral problems and affective instability much worsened following stroke in June 2018. Pt will do best in consistent environment and not a necessary for psychiatry in that she is improving here with help of managing medical illnesses and behavior can be well managed on present unit.

## 2019-12-13 NOTE — ROUTINE PROCESS
Pt is refusing blood draw. I myself and another nurse have both attempted to explain to the patient that these labs are important. None

## 2023-05-27 NOTE — PROGRESS NOTES
Bedside shift change report given to [address] (oncoming nurse) by Heidi Gordillo (offgoing nurse). Report included the following information SBAR, MAR and Recent Results. Partial labs sent and UA. Unsuccessful PIV start. Report to Allen who will now assume care of this  pt.

## 2024-12-18 NOTE — PROGRESS NOTES
Case discussed w/ Shay Graham, [facility] informed patient not meeting criteria for TDO however patient could be admitted voluntarily. SBAR provided to [employer]. Call placed to [place] ,RN provided Granddaughter Sergio Stakes [phone] as HIPAA contact from PCP office if patient discharged back to [facility]. n/a